# Patient Record
Sex: FEMALE | Race: WHITE | Employment: UNEMPLOYED
[De-identification: names, ages, dates, MRNs, and addresses within clinical notes are randomized per-mention and may not be internally consistent; named-entity substitution may affect disease eponyms.]

---

## 2017-02-08 ENCOUNTER — OFFICE VISIT (OUTPATIENT)
Dept: ORTHOPEDIC SURGERY | Facility: CLINIC | Age: 72
End: 2017-02-08

## 2017-02-08 DIAGNOSIS — M25.561 CHRONIC PAIN OF BOTH KNEES: Primary | ICD-10-CM

## 2017-02-08 DIAGNOSIS — G89.29 CHRONIC PAIN OF BOTH KNEES: Primary | ICD-10-CM

## 2017-02-08 DIAGNOSIS — M25.562 CHRONIC PAIN OF BOTH KNEES: Primary | ICD-10-CM

## 2017-02-08 PROCEDURE — 99213 OFFICE O/P EST LOW 20 MIN: CPT | Performed by: ORTHOPAEDIC SURGERY

## 2017-05-15 PROBLEM — H61.009 CHONDRODERMATITIS NODULARIS CHRONICA HELICIS: Status: ACTIVE | Noted: 2017-05-15

## 2017-05-15 PROBLEM — L91.8 INFLAMED SKIN TAG: Status: ACTIVE | Noted: 2017-05-15

## 2017-08-04 ENCOUNTER — HOSPITAL ENCOUNTER (OUTPATIENT)
Dept: PHYSICAL THERAPY | Age: 72
Setting detail: THERAPIES SERIES
Discharge: HOME | End: 2017-08-04
Payer: MEDICARE

## 2017-08-04 PROCEDURE — 97110 THERAPEUTIC EXERCISES: CPT

## 2017-08-04 PROCEDURE — G8979 MOBILITY GOAL STATUS: HCPCS

## 2017-08-04 PROCEDURE — G8978 MOBILITY CURRENT STATUS: HCPCS

## 2017-08-04 PROCEDURE — 97162 PT EVAL MOD COMPLEX 30 MIN: CPT

## 2017-08-09 ENCOUNTER — HOSPITAL ENCOUNTER (OUTPATIENT)
Dept: PHYSICAL THERAPY | Age: 72
Setting detail: THERAPIES SERIES
Discharge: HOME | End: 2017-08-09
Payer: MEDICARE

## 2017-08-09 PROCEDURE — 97113 AQUATIC THERAPY/EXERCISES: CPT

## 2017-08-09 ASSESSMENT — PAIN DESCRIPTION - LOCATION: LOCATION: ANKLE;BACK

## 2017-08-09 ASSESSMENT — PAIN DESCRIPTION - ORIENTATION: ORIENTATION: LOWER;LEFT

## 2017-08-09 ASSESSMENT — PAIN SCALES - GENERAL: PAINLEVEL_OUTOF10: 1

## 2017-08-14 ENCOUNTER — HOSPITAL ENCOUNTER (OUTPATIENT)
Dept: PHYSICAL THERAPY | Age: 72
Setting detail: THERAPIES SERIES
Discharge: HOME | End: 2017-08-14
Payer: MEDICARE

## 2017-08-14 PROCEDURE — 97113 AQUATIC THERAPY/EXERCISES: CPT

## 2017-08-14 ASSESSMENT — PAIN DESCRIPTION - ORIENTATION: ORIENTATION: LOWER

## 2017-08-14 ASSESSMENT — PAIN SCALES - GENERAL: PAINLEVEL_OUTOF10: 1

## 2017-08-14 ASSESSMENT — PAIN DESCRIPTION - PAIN TYPE: TYPE: CHRONIC PAIN

## 2017-08-14 ASSESSMENT — PAIN DESCRIPTION - LOCATION: LOCATION: BACK

## 2017-08-16 ENCOUNTER — HOSPITAL ENCOUNTER (OUTPATIENT)
Dept: PHYSICAL THERAPY | Age: 72
Setting detail: THERAPIES SERIES
Discharge: HOME | End: 2017-08-16
Payer: MEDICARE

## 2017-08-16 PROCEDURE — 97113 AQUATIC THERAPY/EXERCISES: CPT

## 2017-08-21 ENCOUNTER — HOSPITAL ENCOUNTER (OUTPATIENT)
Dept: PHYSICAL THERAPY | Age: 72
Setting detail: THERAPIES SERIES
Discharge: HOME | End: 2017-08-21
Payer: MEDICARE

## 2017-08-21 PROCEDURE — 97113 AQUATIC THERAPY/EXERCISES: CPT

## 2017-08-21 ASSESSMENT — PAIN DESCRIPTION - PAIN TYPE: TYPE: CHRONIC PAIN

## 2017-08-21 ASSESSMENT — PAIN DESCRIPTION - ORIENTATION: ORIENTATION: LOWER

## 2017-08-21 ASSESSMENT — PAIN SCALES - GENERAL: PAINLEVEL_OUTOF10: 1

## 2017-08-21 ASSESSMENT — PAIN DESCRIPTION - LOCATION: LOCATION: BACK

## 2017-08-23 ENCOUNTER — HOSPITAL ENCOUNTER (OUTPATIENT)
Dept: PHYSICAL THERAPY | Age: 72
Setting detail: THERAPIES SERIES
Discharge: HOME | End: 2017-08-23
Payer: MEDICARE

## 2017-08-23 PROCEDURE — 97113 AQUATIC THERAPY/EXERCISES: CPT

## 2017-08-28 ENCOUNTER — HOSPITAL ENCOUNTER (OUTPATIENT)
Dept: PHYSICAL THERAPY | Age: 72
Setting detail: THERAPIES SERIES
Discharge: HOME | End: 2017-08-28
Payer: MEDICARE

## 2017-08-28 PROCEDURE — 97113 AQUATIC THERAPY/EXERCISES: CPT

## 2017-08-30 ENCOUNTER — APPOINTMENT (OUTPATIENT)
Dept: PHYSICAL THERAPY | Age: 72
End: 2017-08-30
Payer: MEDICARE

## 2017-08-30 ENCOUNTER — HOSPITAL ENCOUNTER (OUTPATIENT)
Dept: PHYSICAL THERAPY | Age: 72
Setting detail: THERAPIES SERIES
Discharge: HOME | End: 2017-08-30
Payer: MEDICARE

## 2017-08-30 PROCEDURE — G8979 MOBILITY GOAL STATUS: HCPCS

## 2017-08-30 PROCEDURE — 97113 AQUATIC THERAPY/EXERCISES: CPT

## 2017-08-30 PROCEDURE — G8978 MOBILITY CURRENT STATUS: HCPCS

## 2017-08-30 PROCEDURE — 97110 THERAPEUTIC EXERCISES: CPT

## 2017-09-06 ENCOUNTER — HOSPITAL ENCOUNTER (OUTPATIENT)
Dept: PHYSICAL THERAPY | Age: 72
Setting detail: THERAPIES SERIES
Discharge: HOME | End: 2017-09-06
Payer: MEDICARE

## 2017-09-06 PROCEDURE — 97113 AQUATIC THERAPY/EXERCISES: CPT

## 2017-09-08 ENCOUNTER — HOSPITAL ENCOUNTER (OUTPATIENT)
Dept: PHYSICAL THERAPY | Age: 72
Setting detail: THERAPIES SERIES
Discharge: HOME | End: 2017-09-08
Payer: MEDICARE

## 2017-09-08 PROCEDURE — 97113 AQUATIC THERAPY/EXERCISES: CPT

## 2017-09-11 ENCOUNTER — HOSPITAL ENCOUNTER (OUTPATIENT)
Dept: PHYSICAL THERAPY | Age: 72
Setting detail: THERAPIES SERIES
Discharge: HOME | End: 2017-09-11
Payer: MEDICARE

## 2017-09-13 ENCOUNTER — HOSPITAL ENCOUNTER (OUTPATIENT)
Dept: PHYSICAL THERAPY | Age: 72
Setting detail: THERAPIES SERIES
Discharge: HOME | End: 2017-09-13
Payer: MEDICARE

## 2017-09-13 PROCEDURE — 97113 AQUATIC THERAPY/EXERCISES: CPT

## 2017-09-13 ASSESSMENT — PAIN SCALES - GENERAL: PAINLEVEL_OUTOF10: 2

## 2017-09-13 ASSESSMENT — PAIN DESCRIPTION - ORIENTATION: ORIENTATION: LOWER

## 2017-09-13 ASSESSMENT — PAIN DESCRIPTION - LOCATION: LOCATION: BACK

## 2017-09-13 ASSESSMENT — PAIN DESCRIPTION - PAIN TYPE: TYPE: CHRONIC PAIN

## 2017-09-15 ENCOUNTER — HOSPITAL ENCOUNTER (OUTPATIENT)
Dept: PHYSICAL THERAPY | Age: 72
Setting detail: THERAPIES SERIES
Discharge: HOME | End: 2017-09-15
Payer: MEDICARE

## 2017-09-15 PROCEDURE — 97113 AQUATIC THERAPY/EXERCISES: CPT

## 2018-04-19 ENCOUNTER — ANESTHESIA EVENT (OUTPATIENT)
Dept: OPERATING ROOM | Age: 73
End: 2018-04-19
Payer: MEDICARE

## 2018-04-19 ENCOUNTER — HOSPITAL ENCOUNTER (OUTPATIENT)
Age: 73
Setting detail: OUTPATIENT SURGERY
Discharge: HOME | End: 2018-04-19
Attending: SURGERY | Admitting: SURGERY
Payer: MEDICARE

## 2018-04-19 ENCOUNTER — ANESTHESIA (OUTPATIENT)
Dept: OPERATING ROOM | Age: 73
End: 2018-04-19
Payer: MEDICARE

## 2018-04-19 VITALS
DIASTOLIC BLOOD PRESSURE: 77 MMHG | HEART RATE: 67 BPM | OXYGEN SATURATION: 92 % | SYSTOLIC BLOOD PRESSURE: 127 MMHG | TEMPERATURE: 97.5 F | WEIGHT: 232 LBS | RESPIRATION RATE: 14 BRPM | BODY MASS INDEX: 39.61 KG/M2 | HEIGHT: 64 IN

## 2018-04-19 VITALS
TEMPERATURE: 96.8 F | DIASTOLIC BLOOD PRESSURE: 86 MMHG | RESPIRATION RATE: 20 BRPM | SYSTOLIC BLOOD PRESSURE: 141 MMHG | OXYGEN SATURATION: 99 %

## 2018-04-19 PROBLEM — Z86.010 HISTORY OF ADENOMATOUS POLYP OF COLON: Status: ACTIVE | Noted: 2018-04-19

## 2018-04-19 PROBLEM — Z86.0101 HISTORY OF ADENOMATOUS POLYP OF COLON: Chronic | Status: ACTIVE | Noted: 2018-04-19

## 2018-04-19 PROBLEM — G89.4 CHRONIC PAIN DISORDER: Status: ACTIVE | Noted: 2018-04-19

## 2018-04-19 PROBLEM — H61.009 CHONDRODERMATITIS NODULARIS CHRONICA HELICIS: Chronic | Status: ACTIVE | Noted: 2017-05-15

## 2018-04-19 PROBLEM — G89.4 CHRONIC PAIN DISORDER: Chronic | Status: ACTIVE | Noted: 2018-04-19

## 2018-04-19 PROBLEM — Z86.010 HISTORY OF ADENOMATOUS POLYP OF COLON: Chronic | Status: ACTIVE | Noted: 2018-04-19

## 2018-04-19 PROBLEM — Z86.0101 HISTORY OF ADENOMATOUS POLYP OF COLON: Status: ACTIVE | Noted: 2018-04-19

## 2018-04-19 PROCEDURE — 88305 TISSUE EXAM BY PATHOLOGIST: CPT

## 2018-04-19 PROCEDURE — 7100000031 HC ASPR PHASE II RECOVERY - ADDTL 15 MIN: Performed by: SURGERY

## 2018-04-19 PROCEDURE — 7100000001 HC PACU RECOVERY - ADDTL 15 MIN: Performed by: SURGERY

## 2018-04-19 PROCEDURE — 3700000000 HC ANESTHESIA ATTENDED CARE: Performed by: SURGERY

## 2018-04-19 PROCEDURE — 3700000001 HC ADD 15 MINUTES (ANESTHESIA): Performed by: SURGERY

## 2018-04-19 PROCEDURE — 2580000003 HC RX 258: Performed by: ANESTHESIOLOGY

## 2018-04-19 PROCEDURE — 7100000000 HC PACU RECOVERY - FIRST 15 MIN: Performed by: SURGERY

## 2018-04-19 PROCEDURE — 2500000003 HC RX 250 WO HCPCS: Performed by: NURSE ANESTHETIST, CERTIFIED REGISTERED

## 2018-04-19 PROCEDURE — 3609010400 HC COLONOSCOPY POLYPECTOMY HOT BIOPSY: Performed by: SURGERY

## 2018-04-19 PROCEDURE — 6360000002 HC RX W HCPCS: Performed by: NURSE ANESTHETIST, CERTIFIED REGISTERED

## 2018-04-19 PROCEDURE — 7100000030 HC ASPR PHASE II RECOVERY - FIRST 15 MIN: Performed by: SURGERY

## 2018-04-19 RX ORDER — KETAMINE HYDROCHLORIDE 50 MG/ML
INJECTION, SOLUTION, CONCENTRATE INTRAMUSCULAR; INTRAVENOUS PRN
Status: DISCONTINUED | OUTPATIENT
Start: 2018-04-19 | End: 2018-04-19 | Stop reason: SDUPTHER

## 2018-04-19 RX ORDER — SODIUM CHLORIDE, SODIUM LACTATE, POTASSIUM CHLORIDE, CALCIUM CHLORIDE 600; 310; 30; 20 MG/100ML; MG/100ML; MG/100ML; MG/100ML
INJECTION, SOLUTION INTRAVENOUS CONTINUOUS
Status: DISCONTINUED | OUTPATIENT
Start: 2018-04-19 | End: 2018-04-19 | Stop reason: HOSPADM

## 2018-04-19 RX ORDER — LIDOCAINE HYDROCHLORIDE 10 MG/ML
INJECTION, SOLUTION EPIDURAL; INFILTRATION; INTRACAUDAL; PERINEURAL PRN
Status: DISCONTINUED | OUTPATIENT
Start: 2018-04-19 | End: 2018-04-19 | Stop reason: SDUPTHER

## 2018-04-19 RX ORDER — DEXAMETHASONE SODIUM PHOSPHATE 4 MG/ML
INJECTION, SOLUTION INTRA-ARTICULAR; INTRALESIONAL; INTRAMUSCULAR; INTRAVENOUS; SOFT TISSUE PRN
Status: DISCONTINUED | OUTPATIENT
Start: 2018-04-19 | End: 2018-04-19 | Stop reason: SDUPTHER

## 2018-04-19 RX ORDER — MIDAZOLAM HYDROCHLORIDE 1 MG/ML
INJECTION INTRAMUSCULAR; INTRAVENOUS PRN
Status: DISCONTINUED | OUTPATIENT
Start: 2018-04-19 | End: 2018-04-19 | Stop reason: SDUPTHER

## 2018-04-19 RX ORDER — PROPOFOL 10 MG/ML
INJECTION, EMULSION INTRAVENOUS PRN
Status: DISCONTINUED | OUTPATIENT
Start: 2018-04-19 | End: 2018-04-19 | Stop reason: SDUPTHER

## 2018-04-19 RX ORDER — LIDOCAINE HYDROCHLORIDE 10 MG/ML
1 INJECTION, SOLUTION EPIDURAL; INFILTRATION; INTRACAUDAL; PERINEURAL ONCE
Status: DISCONTINUED | OUTPATIENT
Start: 2018-04-19 | End: 2018-04-19 | Stop reason: HOSPADM

## 2018-04-19 RX ORDER — ONDANSETRON 2 MG/ML
INJECTION INTRAMUSCULAR; INTRAVENOUS PRN
Status: DISCONTINUED | OUTPATIENT
Start: 2018-04-19 | End: 2018-04-19 | Stop reason: SDUPTHER

## 2018-04-19 RX ADMIN — PROPOFOL 30 MG: 10 INJECTION, EMULSION INTRAVENOUS at 09:20

## 2018-04-19 RX ADMIN — PROPOFOL 20 MG: 10 INJECTION, EMULSION INTRAVENOUS at 09:38

## 2018-04-19 RX ADMIN — PROPOFOL 20 MG: 10 INJECTION, EMULSION INTRAVENOUS at 09:28

## 2018-04-19 RX ADMIN — PROPOFOL 20 MG: 10 INJECTION, EMULSION INTRAVENOUS at 09:35

## 2018-04-19 RX ADMIN — KETAMINE HYDROCHLORIDE 30 MG: 50 INJECTION, SOLUTION INTRAMUSCULAR; INTRAVENOUS at 09:19

## 2018-04-19 RX ADMIN — MIDAZOLAM 1 MG: 1 INJECTION INTRAMUSCULAR; INTRAVENOUS at 09:16

## 2018-04-19 RX ADMIN — MIDAZOLAM 1 MG: 1 INJECTION INTRAMUSCULAR; INTRAVENOUS at 09:13

## 2018-04-19 RX ADMIN — PROPOFOL 20 MG: 10 INJECTION, EMULSION INTRAVENOUS at 09:26

## 2018-04-19 RX ADMIN — ONDANSETRON 4 MG: 2 INJECTION INTRAMUSCULAR; INTRAVENOUS at 09:26

## 2018-04-19 RX ADMIN — SODIUM CHLORIDE, POTASSIUM CHLORIDE, SODIUM LACTATE AND CALCIUM CHLORIDE: 600; 310; 30; 20 INJECTION, SOLUTION INTRAVENOUS at 08:32

## 2018-04-19 RX ADMIN — DEXAMETHASONE SODIUM PHOSPHATE 4 MG: 4 INJECTION, SOLUTION INTRAMUSCULAR; INTRAVENOUS at 09:26

## 2018-04-19 RX ADMIN — PROPOFOL 20 MG: 10 INJECTION, EMULSION INTRAVENOUS at 09:41

## 2018-04-19 RX ADMIN — PROPOFOL 20 MG: 10 INJECTION, EMULSION INTRAVENOUS at 09:23

## 2018-04-19 RX ADMIN — PROPOFOL 20 MG: 10 INJECTION, EMULSION INTRAVENOUS at 09:33

## 2018-04-19 RX ADMIN — LIDOCAINE HYDROCHLORIDE 50 MG: 10 INJECTION, SOLUTION EPIDURAL; INFILTRATION; INTRACAUDAL; PERINEURAL at 09:19

## 2018-04-19 RX ADMIN — PROPOFOL 20 MG: 10 INJECTION, EMULSION INTRAVENOUS at 09:30

## 2018-04-19 ASSESSMENT — PULMONARY FUNCTION TESTS
PIF_VALUE: 0

## 2018-04-19 ASSESSMENT — PAIN SCALES - GENERAL
PAINLEVEL_OUTOF10: 0
PAINLEVEL_OUTOF10: 0

## 2018-04-19 ASSESSMENT — PAIN - FUNCTIONAL ASSESSMENT: PAIN_FUNCTIONAL_ASSESSMENT: 0-10

## 2018-04-20 LAB — SURGICAL PATHOLOGY REPORT: NORMAL

## 2018-06-18 ENCOUNTER — HOSPITAL ENCOUNTER (OUTPATIENT)
Dept: WOMENS IMAGING | Age: 73
Discharge: HOME | End: 2018-06-20
Payer: MEDICARE

## 2018-06-18 DIAGNOSIS — I10 ESSENTIAL HYPERTENSION: Chronic | ICD-10-CM

## 2018-06-18 DIAGNOSIS — Z78.0 MENOPAUSE: ICD-10-CM

## 2018-06-18 DIAGNOSIS — Z12.31 ENCOUNTER FOR SCREENING MAMMOGRAM FOR MALIGNANT NEOPLASM OF BREAST: ICD-10-CM

## 2018-06-18 PROCEDURE — 77067 SCR MAMMO BI INCL CAD: CPT

## 2018-06-18 PROCEDURE — 77080 DXA BONE DENSITY AXIAL: CPT

## 2018-09-26 ENCOUNTER — HOSPITAL ENCOUNTER (OUTPATIENT)
Dept: MRI IMAGING | Facility: CLINIC | Age: 73
Discharge: HOME | End: 2018-09-28
Payer: MEDICARE

## 2018-09-26 DIAGNOSIS — M54.16 RADICULOPATHY OF LUMBAR REGION: ICD-10-CM

## 2018-09-26 PROCEDURE — 72148 MRI LUMBAR SPINE W/O DYE: CPT

## 2018-10-22 PROBLEM — R35.0 URINARY FREQUENCY: Status: ACTIVE | Noted: 2018-10-22

## 2018-11-08 ENCOUNTER — HOSPITAL ENCOUNTER (OUTPATIENT)
Dept: PHYSICAL THERAPY | Age: 73
Setting detail: THERAPIES SERIES
Discharge: HOME | End: 2018-11-08
Payer: MEDICARE

## 2018-11-08 PROCEDURE — G8979 MOBILITY GOAL STATUS: HCPCS

## 2018-11-08 PROCEDURE — 97110 THERAPEUTIC EXERCISES: CPT

## 2018-11-08 PROCEDURE — 97162 PT EVAL MOD COMPLEX 30 MIN: CPT

## 2018-11-08 PROCEDURE — G8978 MOBILITY CURRENT STATUS: HCPCS

## 2018-11-08 NOTE — PROGRESS NOTES
[address] [facility]  [address] [facility]. [address]         Phone: [phone]       Fax: [phone]     Physical Therapy Spine Evaluation    Date:  2018  Patient: Aristides Oshea  : 1945  MRN: [id number]  Physician: Feliciano Bucio 2: [address] [address]   Medical Diagnosis: M51.26 lumbar disc displacement  Rehab Codes: low back pain  Onset Date: 10/25/18 referral  Next 's appt.:     Subjective: Patient presents to physical therapy with complaints of pain in the (L) lumbar spine. Patient has had intermittent symptoms for years, but recent increase prompted MD appointment and referral to therapy. Patient notes increased complaints of symptoms with walking more than 5 minutes, with standing 10 minutes. Patient with increased complaints of symptoms in the AM, more \"stiffness\". Patient has previously done aquatic therapy with positive results. MRI showed significant degenerative issues per the patient, but patient wants to do nonsurgical treatments. CC:complaints of low back pain in the (L) lateral lumbar spine  HPI: onset of symptoms for years, recent increase prompted MD appointment on above referral date.     PMHx: [] Unremarkable [] Diabetes [] HTN  [] Pacemaker   [] MI/Heart Problems [] Cancer [] Arthritis [] Other:              [x] Refer to full medical chart  In EPIC   Tests: [x] X-Ray: [] MRI:  [] Other:    Job/ADL Description:  Pain:  [x] Yes  [] No Location: low back, into (L) lateral lumbar spine Pain Rating: (0-10 scale) 1/10 currently up to 8/10 @ worst  Pain altered Tx:  [] Yes  [] No  Action:  Symptoms:  [] Improving [] Worsening [] Same      Objective:  LE strength testing WFL (B)  ROM:  Lumbar flexion to ankles, extension to 8 degrees      TESTS (+/-) LEFT RIGHT Not Tested   SLR [] sit [] supine (-) (-) []   Slump/Dural (-) (-) []       OBSERVATION No Deficit Deficit Not Tested Comments   Posture       Palpation [] [] [] MIN TTP L3-L4   Sensation []

## 2018-11-15 ENCOUNTER — HOSPITAL ENCOUNTER (OUTPATIENT)
Dept: PHYSICAL THERAPY | Age: 73
Setting detail: THERAPIES SERIES
Discharge: HOME | End: 2018-11-15
Payer: MEDICARE

## 2018-11-15 PROCEDURE — 97113 AQUATIC THERAPY/EXERCISES: CPT

## 2018-11-15 NOTE — PROGRESS NOTES
Physical Therapy    [address] [place] [address] Outpatient Physical Therapy   [address]   Phone: [phone]   Fax: [phone]    Physical Therapy Daily Treatment Note    Date:  11/15/2018  Patient Name:  Khai Whitfield    :  1945  MRN: [id number]  Physician: [address]: [address] [place] [address]. [facility]  Medical Diagnosis: M51.26 lumbar disc displacement                     Rehab Codes: low back pain  Onset Date: 10/25/18 referral                 Next 's appt. :   Visit# / total visits: 2  Cancels/No Shows: 0/0    Subjective:   Denies pain this morning due to taking her pain meds. States pain was only 1/10 before but knew she was coming to therapy so she took her meds. Pain:  [] Yes  [x] No Location:   Pain Rating: (0-10 scale) 0/10  Pain altered Tx:  [] No  [] Yes  Action:  Comments:Initial aquatic therapy visit. Educated on postural awareness and pelvic neutral.  Also educated to work within pain free ranges and avoid aggravating movements. Objective:  [facility] Exercise Log  Aquatic, Hip & DLS Program- Phase 1    Date of Eval:                                Primary PT:  Diagnosis: Things to Focus On (goals):   Surgical Precautions:  Medical Precautions:  [] C-9 dates  [] Occ Med   [] Medicare       Date 11/15/18       Visit # 2/12       Walk F/L/R 2 Laps       Marching 15x       Squats 10x5\"       Step-Ups F/L        Heel-toe raises 15x       SLR F/L/R 15x       Knee/Flex/Ext        F/L Lunges        Kickboard Ex. Sm       Iso Abd. 10x5\"       Push-pull 10x       Paddling 10x               Deep water 1 Noodle       Hang 5'       Cycling 2'       Stretches        Achllies 2x20\"       Hamstring 2x20\"                               Cool Down 2 Laps               Pain Rating 0         Specific Instructions for next treatment: assess Patient's tolerance to exercise and progress as able.     Treatment Charges: Mins Units   []  Modalities []  Ther Exercise     []  Manual Therapy     []  Ther Activities     [x]  Aquatics 30 2   []  Neuromuscular     []  Other     Total Treatment time 30 2       Assessment: [x] Progressing toward goals. [] No change. [] Other:    STG: (to be met in 6 treatments)  1. ? Pain: Improved pain levels 2-3@ worse, 0-1 at rest  2. ? ROM: Improved flexion to floor without pain, improved extension to 15 degrees with minimal complaints of symptoms. 3. ? Function: Improved functional tolerance of walking up to 15 minutes, improved standing tolerance up to 20 minutes with minimal to no symptoms, improved symptoms in the AM by 80% grossly.     LTG 12 visits  4. ? Pain: Improved pain levels 0-1  5. ? ROM: Improved flexion to floor without pain, improved extension to 20 degrees with minimal complaints of symptoms. 6. ? Function: Improved functional tolerance of walking up to 30 minutes, improved standing tolerance up to 30 minutes with minimal to no symptoms, improved symptoms in the AM by 90% grossly. Patient goals: improved function    Pt. Education:  [x] Yes  [] No  [] Reviewed Prior HEP/Ed  Method of Education: [x] Verbal  [x] Demo  [] Written  Comprehension of Education:  [x] Verbalizes understanding. [x] Demonstrates understanding. [] Needs review. [] Demonstrates/verbalizes HEP/Ed previously given. Plan: [x] Continue per plan of care.    [] Other:      Time In: 2846         Time Out: 0999    Electronically signed by:  Berneice Ganser, PTA

## 2018-11-19 ENCOUNTER — HOSPITAL ENCOUNTER (OUTPATIENT)
Dept: PHYSICAL THERAPY | Age: 73
Setting detail: THERAPIES SERIES
Discharge: HOME | End: 2018-11-19
Payer: MEDICARE

## 2018-11-19 PROCEDURE — 97113 AQUATIC THERAPY/EXERCISES: CPT

## 2018-11-19 NOTE — PROGRESS NOTES
[x]  Aquatics 30 2   []  Neuromuscular     []  Other     Total Treatment time 30 2       Assessment: [x] Progressing toward goals. [] No change. [] Other:    STG: (to be met in 6 treatments)  1. ? Pain: Improved pain levels 2-3@ worse, 0-1 at rest  2. ? ROM: Improved flexion to floor without pain, improved extension to 15 degrees with minimal complaints of symptoms. 3. ? Function: Improved functional tolerance of walking up to 15 minutes, improved standing tolerance up to 20 minutes with minimal to no symptoms, improved symptoms in the AM by 80% grossly.     LTG 12 visits  4. ? Pain: Improved pain levels 0-1  5. ? ROM: Improved flexion to floor without pain, improved extension to 20 degrees with minimal complaints of symptoms. 6. ? Function: Improved functional tolerance of walking up to 30 minutes, improved standing tolerance up to 30 minutes with minimal to no symptoms, improved symptoms in the AM by 90% grossly. Patient goals: improved function    Pt. Education:  [x] Yes  [] No  [] Reviewed Prior HEP/Ed  Method of Education: [x] Verbal  [x] Demo  [] Written  Comprehension of Education:  [x] Verbalizes understanding. [x] Demonstrates understanding. [] Needs review. [] Demonstrates/verbalizes HEP/Ed previously given. Plan: [x] Continue per plan of care.    [] Other:      Time In: 9638         Time Out: 1005    Electronically signed by:  Cyndi Junior PTA

## 2018-11-21 ENCOUNTER — HOSPITAL ENCOUNTER (OUTPATIENT)
Dept: PHYSICAL THERAPY | Age: 73
Setting detail: THERAPIES SERIES
Discharge: HOME | End: 2018-11-21
Payer: MEDICARE

## 2018-11-21 PROCEDURE — 97113 AQUATIC THERAPY/EXERCISES: CPT

## 2018-11-21 NOTE — PROGRESS NOTES
Physical Therapy    [address] [facility]   [address]   Phone: [phone]   Fax: [phone]    Physical Therapy Daily Treatment Note    Date:  2018  Patient Name:  Esteban Moe    :  1945  MRN: [id number]  Physician: [address]: [address] [place] [address]. [facility]  Medical Diagnosis: M51.26 lumbar disc displacement                     Rehab Codes: low back pain  Onset Date: 10/25/18 referral                 Next 's appt. :   Visit# / total visits:   Cancels/No Shows: 0/0    Subjective:   States a little achy this morning but not bad. Pain:  [x] Yes  [] No Location: Low back  Pain Rating: (0-10 scale) 1/10  Pain altered Tx:  [] No  [] Yes  Action:  Comments:reviewed postural awareness and pelvic neutral.      Objective:  [facility] Exercise Log  Aquatic, Hip & DLS Program- Phase 1    Date of Eval:                                Primary PT:  Diagnosis: Things to Focus On (goals):   Surgical Precautions:  Medical Precautions:  [] C-9 dates  [] Occ Med   [] Medicare       Date 11/15/18 11/19/18 11/21/18     Visit # 212 3/12 4/12     Walk F/L/R 2 Laps 2 Laps 2 Laps     Marching 15x 2 Laps 2 Laps     Squats 10x5\" 15x5\" 15x5\"     Step-Ups F/L  Low 10x Tall 10x     Heel-toe raises 15x 15x 15x     SLR F/L/R 15x 15x 15x     Knee/Flex/Ext  15x 15x     F/L Lunges        Kickboard Ex. Sm Med Med     Iso Abd. 10x5\" 15x5\" 15x5\"     Push-pull 10x 15x 15x     Paddling 10x 15x 15x             Deep water 1 Noodle 1 Noodle 1 Noodle     Hang 5' 5' 5'     Cycling 2' 2' 2'     Cross country   2'     Jacks  2' 2'     Stretches        Achllies 2x20\" 2x20\" 2x20\"     Hamstring 2x20\" 2x20\" 2x20\"                             Cool Down 2 Laps 2 Laps 2 Laps             Pain Rating 0 0 1               Specific Instructions for next treatment: assess Patient's tolerance to exercise and progress as able.     Treatment Charges: Mins

## 2018-11-26 ENCOUNTER — HOSPITAL ENCOUNTER (OUTPATIENT)
Dept: PHYSICAL THERAPY | Age: 73
Setting detail: THERAPIES SERIES
Discharge: HOME | End: 2018-11-26
Payer: MEDICARE

## 2018-11-26 PROCEDURE — 97113 AQUATIC THERAPY/EXERCISES: CPT

## 2018-11-29 ENCOUNTER — HOSPITAL ENCOUNTER (OUTPATIENT)
Dept: PHYSICAL THERAPY | Age: 73
Setting detail: THERAPIES SERIES
Discharge: HOME | End: 2018-11-29
Payer: MEDICARE

## 2018-11-29 PROCEDURE — 97113 AQUATIC THERAPY/EXERCISES: CPT

## 2018-11-29 NOTE — PROGRESS NOTES
2'   Cross country   2' 2' 2'   Jacks  2' 2' 2' 2'   Stretches        Achllies 2x20\" 2x20\" 2x20\" 2x20\" 2x20\"   Hamstring 2x20\" 2x20\" 2x20\" 2x20\" 2x20\"           Cool Down 2 Laps 2 Laps 2 Laps 2 Laps 2 Laps   Pain Rating 0 0 1 1 1     Specific Instructions for next treatment: Add UE format to challenge core stability    Treatment Charges: Mins Units   []  Modalities     []  Ther Exercise     []  Manual Therapy     []  Ther Activities     [x]  Aquatics 40 3   []  Neuromuscular     []  Other     Total Treatment time 40 3       Assessment: [x] Progressing toward goals. Reviewed technique and emphasized good core stability with exercise. Added Tall box to squats and started UE core stability exercises today. [] No change. [] Other:    STG: (to be met in 6 treatments)  1. ? Pain: Improved pain levels 2-3@ worse, 0-1 at rest  2. ? ROM: Improved flexion to floor without pain, improved extension to 15 degrees with minimal complaints of symptoms. 3. ? Function: Improved functional tolerance of walking up to 15 minutes, improved standing tolerance up to 20 minutes with minimal to no symptoms, improved symptoms in the AM by 80% grossly.     LTG 12 visits  4. ? Pain: Improved pain levels 0-1  5. ? ROM: Improved flexion to floor without pain, improved extension to 20 degrees with minimal complaints of symptoms. 6. ? Function: Improved functional tolerance of walking up to 30 minutes, improved standing tolerance up to 30 minutes with minimal to no symptoms, improved symptoms in the AM by 90% grossly. Patient goals: improved function    Pt. Education:  [x] Yes  [] No  [] Reviewed Prior HEP/Ed  Method of Education: [x] Verbal  [x] Demo  [] Written  Comprehension of Education:  [x] Verbalizes understanding. [x] Demonstrates understanding. [] Needs review. [] Demonstrates/verbalizes HEP/Ed previously given. Plan: [x] Continue per plan of care.    [] Other:      Time In: 083         Time Out: 2638    Electronically signed by:  Berneice Ganser, PTA

## 2018-12-03 ENCOUNTER — HOSPITAL ENCOUNTER (OUTPATIENT)
Dept: PHYSICAL THERAPY | Age: 73
Setting detail: THERAPIES SERIES
End: 2018-12-03
Payer: MEDICARE

## 2018-12-06 ENCOUNTER — HOSPITAL ENCOUNTER (OUTPATIENT)
Dept: PHYSICAL THERAPY | Age: 73
Setting detail: THERAPIES SERIES
Discharge: HOME | End: 2018-12-06
Payer: MEDICARE

## 2018-12-06 PROCEDURE — 97113 AQUATIC THERAPY/EXERCISES: CPT

## 2018-12-10 ENCOUNTER — HOSPITAL ENCOUNTER (OUTPATIENT)
Dept: PHYSICAL THERAPY | Age: 73
Setting detail: THERAPIES SERIES
Discharge: HOME | End: 2018-12-10
Payer: MEDICARE

## 2018-12-10 PROCEDURE — 97113 AQUATIC THERAPY/EXERCISES: CPT

## 2018-12-10 NOTE — PROGRESS NOTES
Physical Therapy                    Daily treatment note     [address]   Outpatient Physical Therapy  [address] [facility]. [address]  Phone: [phone]  Fax: [phone]  Daily Progress Note    Date: 12/10/18    Patient Name: Thang Guzmán        MRN: [id number]  Account: [de-identified] : 1945           Objective:    [address] [facility]             [address] [facility] [address]             Phone: [phone]             Fax: [phone]        Date:  12/10/2018  Patient Name:  Thang Guzmán                :  1945               MRN: [id number]  Physician: [place] Hogan [address] Diagnosis: M51.26 lumbar disc displacement                     Rehab Codes: low back pain  Onset Date: 10/25/18 referral                 Next 's appt. :   Visit# / total visits:                Cancels/No Shows: 0/0     Subjective: \"I wish I could come here for a membership. \" Discussed silver sneakers. She has a membership, but hasn't gone yet. Pain:  [x] Yes  [] No       Location: Low back; denies radicular pain  Pain Rating: (0-10 scale) 2/10  Pain altered Tx:  [x] No  [] Yes  Action:  Comments: Emphasis on core stability and postural awareness     Objective:  [facility] Exercise Log  Aquatic, Hip & DLS Program- Phase 1     Date of Eval:                                Primary PT: Apryl Turcios PT  Diagnosis:   Things to Focus On (goals):   Surgical Precautions:  Medical Precautions:  [] C-9 dates  [] Occ Med   [x] Medicare      Date 11/26/18 11/29/18 12/6/18 12/10/18    Visit #    Small cuffs   Walk F/L/R 2 Laps 2 Laps 2 Laps 2 Laps    Marching 2 Laps 2 Laps 2 Laps 2 Laps    Squats 15x5\" Tall 15x5\" Tall 15x5\" Tall 15x5\"    Step-Ups F/L Tall 15x Tall 15x Tall 15x Tall 15x    Heel-toe raises 15x 15x 15x 15x    SLR F/L/R 15x 15x 15x 15x    Knee/Flex/Ext 15x 15x 15x 15x    F/L

## 2018-12-13 ENCOUNTER — HOSPITAL ENCOUNTER (OUTPATIENT)
Dept: PHYSICAL THERAPY | Age: 73
Setting detail: THERAPIES SERIES
End: 2018-12-13
Payer: MEDICARE

## 2018-12-13 ENCOUNTER — HOSPITAL ENCOUNTER (OUTPATIENT)
Dept: PHYSICAL THERAPY | Age: 73
Setting detail: THERAPIES SERIES
Discharge: HOME | End: 2018-12-13
Payer: MEDICARE

## 2018-12-13 NOTE — PROGRESS NOTES
[address] [facility]   [address] [facility] [address]   Phone: [phone]   Fax: [phone]    Physical Therapy Cancel/No Show Note    Date:  2018  Patient Name:  Charles Craig    :  1945  MRN: [id number]  Physician: [address] [place] [address] Road: [address] [place] [address]. [facility] [address] [facility]  Medical Diagnosis: M51.26 lumbar disc displacement                     Rehab Codes: low back pain  Onset Date: 10/25/18 referral                 Next 's appt. :   Visit# / total visits:   Cancels/No Shows: 1/0  Comments: Cancelled today due to furnace going out.      Electronically signed by:  Marija Galvan PTA

## 2018-12-17 ENCOUNTER — HOSPITAL ENCOUNTER (OUTPATIENT)
Dept: PHYSICAL THERAPY | Age: 73
Setting detail: THERAPIES SERIES
Discharge: HOME | End: 2018-12-17
Payer: MEDICARE

## 2018-12-17 PROCEDURE — 97113 AQUATIC THERAPY/EXERCISES: CPT

## 2018-12-17 NOTE — PROGRESS NOTES
[]       Assessment:  Patient with improved complaints of lumbar and (L) lateral lumbar pain at this time. Improved complaints of pain levels, improved tolerance of ambulation with less back pain, improved complaints of stiffness in the AM with ambulation, improved standing tolerance with less pain at this time. Continues to be limited with ascending/descending stairs secondary to back and (R) hip pain, limited with complaints of stiffness in the AM which is improved but not resolved. Patient would benefit from additional therapy at this time to continue to work on limitations per flow sheet. STG: (to be met in 6 treatments)  1. ? Pain: Improved pain levels 2-3@ worse, 0-1 at rest met  2. ? ROM: Improved flexion to floor without pain progressing, improved extension to 15 degrees with minimal complaints of symptoms progressing. 3. ? Function: Improved functional tolerance of walking up to 15 minutes met, improved standing tolerance up to 20 minutes with minimal to no symptoms met, improved symptoms in the AM by 80% grossly progressing.     LTG 12 visits  4. ? Pain: Improved pain levels 0-1 progressing  5. ? ROM: Improved flexion to floor without pain progressing, improved extension to 20 degrees with minimal complaints of symptoms progressing. 6. ? Function: Improved functional tolerance of walking up to 30 minutes progressing, improved standing tolerance up to 30 minutes with minimal to no symptoms progressing, improved symptoms in the AM by 90% grossly progressing.   Patient goals: improved function    Treatment to Date:  [x] Therapeutic Exercise    [] Modalities:  [] Therapeutic Activity    [] Ultrasound  [] Electrical Stimulation  [] Gait Training     [] Massage       [] Lumbar/Cervical Traction  [] Neuromuscular Re-education [] Cold/hotpack [] Iontophoresis: 4 mg/mL  [x] Instruction in Home Exercise Program                     Dexamethasone Sodium  [] Manual Therapy             Phosphate 40-80 mAmin  [x] Aquatic Therapy                   [] Vasocompression/    [] Other:            Game Ready    Patient Status:     [] Continue per initial plan of care. [x] Additional visits necessary. [] Other:     Requested Frequency/Duration: 2-3 times per week for 12 treatments. Electronically signed by: Tyrone Bhatt PT    If you have any questions or concerns, please don't hesitate to call. Thank you for your referral.    Physician Signature:________________________________Date:_____12/17/18_____________  By signing above or cosigning this note, I have reviewed this plan of care and certify a need for medically necessary rehabilitation services.      *PLEASE SIGN ABOVE AND FAX BACK ALL PAGES*

## 2018-12-17 NOTE — PROGRESS NOTES
[address] [facility] Outpatient [facility] Therapy   [address]   Phone: [phone]   Fax: [phone]    Physical Therapy Daily Treatment Note    Date:  2018  Patient Name:  Sarah Watkins    :  1945  MRN: [id number]  Physician: [address]: [address] [address]. [facility]  Medical Diagnosis: M51.26 lumbar disc displacement                     Rehab Codes: low back pain  Onset Date: 10/25/18 referral                 Next 's appt. :   Visit# / total visits:   Cancels/No Shows: 0/0    Subjective:    Notes no pain unless going up stairs. States its more her Right hip causing problems at this time. States still stiff in the morning or if sitting too long. Notes slowly improving since starting therapy. Pain:  [x] Yes  [] No Location: Low back; denies radicular pain  Pain Rating: (0-10 scale) 3/10  Pain altered Tx:  [] No  [] Yes  Action:  Comments: Emphasis on core stability and postural awareness    Objective:  [address] [facility] Exercise Log  Aquatic, Hip & DLS Program- Phase 1    Date of Eval:                                Primary PT: Edwardo Smith PT  Diagnosis: Things to Focus On (goals):   Surgical Precautions:  Medical Precautions:  [] C-9 dates  [] Occ Med   [x] Medicare     Date 18    Visit #        Sm Cuffs    Walk F/L/R 2 Laps 2 Laps 2 Laps    Marching 2 Laps 2 Laps 2 Laps    Squats Tall 15x5\" Tall 15x5\" 15x5\"    Step-Ups F/L Tall 15x Tall 15x Tall 10x    Heel-toe raises 15x 15x 15x    SLR F/L/R 15x 15x 15x    Knee/Flex/Ext 15x 15x 15x    F/L Lunges       Kickboard Ex.  Med Med Med    Iso Abd. 15x5\" 15x5\" 15x5\"    Push-pull 15x 15x 15x    Paddling 15x 15x 15x           UE Format       Horiz Abd 10x 10x 15x    Wipers 10x 10x 15x    Alt Flex/Abd 10x 10x 15x    Press downs 10x 10x 15x    Abd/Add 10x 10x 15x           Deep water 1 Noodle 1 Noodle 1 Noodle    Hang 5' 5' 5'    Cycling 2' 2' 2'    Cross country 2' 2' 2'    Jacks 2' 2' 2'    Stretches       Achllies 2x20\" 2x20\" 2x20\"    Hamstring 2x20\" 2x20\" 2x20\"           Cool Down 2 Laps 2 Laps 2 Laps    Pain Rating 1 2 0      Specific Instructions for next treatment:     Treatment Charges: Mins Units   []  Modalities     []  Ther Exercise     []  Manual Therapy     []  Ther Activities     [x]  Aquatics 40 3   []  Neuromuscular     []  Other     Total Treatment time 40 3       Assessment: [x] Progressing toward goals. Reviewed technique and emphasized good core stability with exercise. [] No change. [] Other:    STG: (to be met in 6 treatments)  1. ? Pain: Improved pain levels 2-3@ worse, 0-1 at rest  2. ? ROM: Improved flexion to floor without pain, improved extension to 15 degrees with minimal complaints of symptoms. 3. ? Function: Improved functional tolerance of walking up to 15 minutes, improved standing tolerance up to 20 minutes with minimal to no symptoms, improved symptoms in the AM by 80% grossly.     LTG 12 visits  4. ? Pain: Improved pain levels 0-1  5. ? ROM: Improved flexion to floor without pain, improved extension to 20 degrees with minimal complaints of symptoms. 6. ? Function: Improved functional tolerance of walking up to 30 minutes, improved standing tolerance up to 30 minutes with minimal to no symptoms, improved symptoms in the AM by 90% grossly. Patient goals: improved function    Pt. Education:  [x] Yes  [] No  [] Reviewed Prior HEP/Ed  Method of Education: [x] Verbal  [x] Demo  [] Written  Comprehension of Education:  [x] Verbalizes understanding. [x] Demonstrates understanding. [] Needs review. [] Demonstrates/verbalizes HEP/Ed previously given. Plan: [x] Continue per plan of care.    [] Other:      Time In: 2119         Time Out: 2763    Electronically signed by:  Kenyetta Peraza PTA

## 2018-12-20 ENCOUNTER — HOSPITAL ENCOUNTER (OUTPATIENT)
Dept: PHYSICAL THERAPY | Age: 73
Setting detail: THERAPIES SERIES
Discharge: HOME | End: 2018-12-20
Payer: MEDICARE

## 2018-12-20 PROCEDURE — 97113 AQUATIC THERAPY/EXERCISES: CPT

## 2018-12-21 ENCOUNTER — OFFICE VISIT (OUTPATIENT)
Dept: PRIMARY CARE CLINIC | Age: 73
End: 2018-12-21
Payer: MEDICARE

## 2018-12-21 VITALS
SYSTOLIC BLOOD PRESSURE: 124 MMHG | DIASTOLIC BLOOD PRESSURE: 80 MMHG | BODY MASS INDEX: 40.61 KG/M2 | HEART RATE: 96 BPM | OXYGEN SATURATION: 97 % | WEIGHT: 236.6 LBS

## 2018-12-21 DIAGNOSIS — M54.16 LUMBAR RADICULOPATHY: Chronic | ICD-10-CM

## 2018-12-21 DIAGNOSIS — G89.4 CHRONIC PAIN DISORDER: Chronic | ICD-10-CM

## 2018-12-21 DIAGNOSIS — I10 ESSENTIAL HYPERTENSION: Primary | Chronic | ICD-10-CM

## 2018-12-21 DIAGNOSIS — E78.2 MIXED HYPERLIPIDEMIA: Chronic | ICD-10-CM

## 2018-12-21 PROCEDURE — 99214 OFFICE O/P EST MOD 30 MIN: CPT | Performed by: FAMILY MEDICINE

## 2018-12-21 RX ORDER — DULOXETIN HYDROCHLORIDE 30 MG/1
30 CAPSULE, DELAYED RELEASE ORAL DAILY
Qty: 270 CAPSULE | Refills: 3
Start: 2018-12-21 | End: 2019-05-20 | Stop reason: ALTCHOICE

## 2018-12-21 ASSESSMENT — ENCOUNTER SYMPTOMS
VOMITING: 0
COUGH: 0
RHINORRHEA: 0
ABDOMINAL PAIN: 0
NAUSEA: 0
SHORTNESS OF BREATH: 0
BACK PAIN: 1
EYE DISCHARGE: 0
WHEEZING: 0
SORE THROAT: 0
DIARRHEA: 0
EYE REDNESS: 0

## 2018-12-21 NOTE — PATIENT INSTRUCTIONS
Decrease Cymbalta to 30 mg daily and see how things go. Patient Education        Sleep Studies: About This Test  What is it? Sleep studies are tests that watch what happens to your body during sleep. These studies usually are done in a sleep lab. Sleep labs are often located in hospitals. But sleep studies also can be done with portable equipment that you use at home. Why is this test done? Sleep studies are done to find sleep problems, including:  · Sleep apnea or excessive snoring. · Narcolepsy. · Nocturnal seizures. · REM behavior disorder (RBD). · Repeated muscle twitching of the feet, arms, or legs while you sleep. How can you prepare for the test?  · You may be asked to keep a sleep diary for 1 to 2 weeks before your sleep study. · Don't take any naps for 2 to 3 days before your test.  · You may be asked to avoid food or drinks with caffeine for a day or two before your test.  · Take a shower or bath before your test, but don't use sprays, oils, or gels on your hair. Don't wear makeup, fingernail polish, or fake nails. · Pack and take along a small overnight bag with personal items, such as a toothbrush, a comb, favorite pillows or blankets, and a book. You can wear your own nightclothes. What happens during the test?  · In the sleep lab, you will be in a private room, much like a hotel room. · Small pads or patches called electrodes will be placed on your head and body with a small amount of glue and tape. These will record things like brain activity, eye movement, oxygen levels, and snoring. · Soft elastic belts will be placed around your chest and belly to measure your breathing. · Your blood oxygen levels will be checked by a small clip (oximeter) placed either on the tip of your index finger or on your earlobe. · If you have sleep apnea, you may wear a mask that is connected to a continuous positive airway pressure (CPAP) machine.   · Depending on the type of test, you will be allowed quiet, dark, and cool. Use curtains, blinds, or a sleep mask to block out light. To block out noise, use earplugs, soothing music, or a \"white noise\" machine. Your evening and bedtime routine  · Create a relaxing bedtime routine. You might want to take a warm shower or bath, listen to soothing music, or drink a cup of noncaffeinated tea. · Go to bed at the same time every night. And get up at the same time every morning, even if you feel tired. What to avoid  · Limit caffeine (coffee, tea, caffeinated sodas) during the day, and don't have any for at least 4 to 6 hours before bedtime. · Don't drink alcohol before bedtime. Alcohol can cause you to wake up more often during the night. · Don't smoke or use tobacco, especially in the evening. Nicotine can keep you awake. · Don't take naps during the day, especially close to bedtime. · Don't lie in bed awake for too long. If you can't fall asleep, or if you wake up in the middle of the night and can't get back to sleep within 15 minutes or so, get out of bed and go to another room until you feel sleepy. · Don't take medicine right before bed that may keep you awake or make you feel hyper or energized. Your doctor can tell you if your medicine may do this and if you can take it earlier in the day. If you can't sleep  · Imagine yourself in a peaceful, pleasant scene. Focus on the details and feelings of being in a place that is relaxing. · Get up and do a quiet or boring activity until you feel sleepy. · Don't drink any liquids after 6 p.m. if you wake up often because you have to go to the bathroom. Where can you learn more? Go to https://[employer]. org and sign in to your [employer] account. Enter O798 in the [employer] box to learn more about \"Learning About Sleeping Well. \"     If you do not have an account, please click on the \"Sign Up Now\" link.   Current as of: December 7, 2017  Content Version: 11.8  © 7252-2247 Healthwise,

## 2018-12-21 NOTE — PROGRESS NOTES
educationalmaterials - see patient instructions. Discussed use, benefit, and side effectsof prescribed medications. All patient questions answered. Pt voiced understanding. Reviewed health maintenance. Instructed to continue current medications, diet andexercise. Patient agreed with treatment plan. Follow up as directed.      Electronicallysigned by Beth Conway MD on 12/21/2018 at 1:25 PM

## 2018-12-26 ENCOUNTER — HOSPITAL ENCOUNTER (OUTPATIENT)
Dept: PHYSICAL THERAPY | Age: 73
Setting detail: THERAPIES SERIES
Discharge: HOME | End: 2018-12-26
Payer: MEDICARE

## 2018-12-26 PROCEDURE — 97113 AQUATIC THERAPY/EXERCISES: CPT

## 2018-12-27 ENCOUNTER — APPOINTMENT (OUTPATIENT)
Dept: PHYSICAL THERAPY | Age: 73
End: 2018-12-27
Payer: MEDICARE

## 2018-12-31 RX ORDER — SIMVASTATIN 20 MG
TABLET ORAL
Qty: 90 TABLET | Refills: 3 | Status: SHIPPED | OUTPATIENT
Start: 2018-12-31 | End: 2019-12-16 | Stop reason: SDUPTHER

## 2019-01-03 ENCOUNTER — HOSPITAL ENCOUNTER (OUTPATIENT)
Dept: PHYSICAL THERAPY | Age: 74
Setting detail: THERAPIES SERIES
Discharge: HOME | End: 2019-01-03
Payer: MEDICARE

## 2019-01-03 PROCEDURE — 97113 AQUATIC THERAPY/EXERCISES: CPT

## 2019-01-07 ENCOUNTER — HOSPITAL ENCOUNTER (OUTPATIENT)
Dept: PHYSICAL THERAPY | Age: 74
Setting detail: THERAPIES SERIES
Discharge: HOME | End: 2019-01-07
Payer: MEDICARE

## 2019-01-07 PROCEDURE — 97113 AQUATIC THERAPY/EXERCISES: CPT

## 2019-01-11 ENCOUNTER — HOSPITAL ENCOUNTER (OUTPATIENT)
Dept: PHYSICAL THERAPY | Age: 74
Setting detail: THERAPIES SERIES
Discharge: HOME | End: 2019-01-11
Payer: MEDICARE

## 2019-01-11 PROCEDURE — 97113 AQUATIC THERAPY/EXERCISES: CPT

## 2019-01-16 ENCOUNTER — HOSPITAL ENCOUNTER (OUTPATIENT)
Dept: PHYSICAL THERAPY | Age: 74
Setting detail: THERAPIES SERIES
Discharge: HOME | End: 2019-01-16
Payer: MEDICARE

## 2019-01-16 PROCEDURE — 97113 AQUATIC THERAPY/EXERCISES: CPT

## 2019-01-21 ENCOUNTER — HOSPITAL ENCOUNTER (OUTPATIENT)
Dept: PHYSICAL THERAPY | Age: 74
Setting detail: THERAPIES SERIES
Discharge: HOME | End: 2019-01-21
Payer: MEDICARE

## 2019-01-21 PROCEDURE — 97113 AQUATIC THERAPY/EXERCISES: CPT

## 2019-01-25 ENCOUNTER — HOSPITAL ENCOUNTER (OUTPATIENT)
Dept: PHYSICAL THERAPY | Age: 74
Setting detail: THERAPIES SERIES
Discharge: HOME | End: 2019-01-25
Payer: MEDICARE

## 2019-01-25 PROCEDURE — 97113 AQUATIC THERAPY/EXERCISES: CPT

## 2019-01-30 ENCOUNTER — HOSPITAL ENCOUNTER (OUTPATIENT)
Dept: PHYSICAL THERAPY | Age: 74
Setting detail: THERAPIES SERIES
Discharge: HOME | End: 2019-01-30
Payer: MEDICARE

## 2019-02-04 ENCOUNTER — HOSPITAL ENCOUNTER (OUTPATIENT)
Dept: PHYSICAL THERAPY | Age: 74
Setting detail: THERAPIES SERIES
Discharge: HOME | End: 2019-02-04
Payer: MEDICARE

## 2019-02-04 PROCEDURE — 97113 AQUATIC THERAPY/EXERCISES: CPT

## 2019-02-08 ENCOUNTER — HOSPITAL ENCOUNTER (OUTPATIENT)
Dept: PHYSICAL THERAPY | Age: 74
Setting detail: THERAPIES SERIES
Discharge: HOME | End: 2019-02-08
Payer: MEDICARE

## 2019-02-08 PROCEDURE — 97113 AQUATIC THERAPY/EXERCISES: CPT

## 2019-02-13 ENCOUNTER — HOSPITAL ENCOUNTER (OUTPATIENT)
Dept: PHYSICAL THERAPY | Age: 74
Setting detail: THERAPIES SERIES
Discharge: HOME | End: 2019-02-13
Payer: MEDICARE

## 2019-02-13 PROCEDURE — 97113 AQUATIC THERAPY/EXERCISES: CPT

## 2019-02-20 ENCOUNTER — HOSPITAL ENCOUNTER (OUTPATIENT)
Dept: PHYSICAL THERAPY | Age: 74
Setting detail: THERAPIES SERIES
Discharge: HOME | End: 2019-02-20
Payer: MEDICARE

## 2019-02-20 PROCEDURE — 97113 AQUATIC THERAPY/EXERCISES: CPT

## 2019-03-27 ENCOUNTER — OFFICE VISIT (OUTPATIENT)
Dept: PRIMARY CARE CLINIC | Age: 74
End: 2019-03-27
Payer: MEDICARE

## 2019-03-27 ENCOUNTER — HOSPITAL ENCOUNTER (OUTPATIENT)
Age: 74
Setting detail: SPECIMEN
Discharge: HOME | End: 2019-03-27
Payer: MEDICARE

## 2019-03-27 VITALS
BODY MASS INDEX: 40.92 KG/M2 | OXYGEN SATURATION: 96 % | SYSTOLIC BLOOD PRESSURE: 136 MMHG | DIASTOLIC BLOOD PRESSURE: 88 MMHG | WEIGHT: 238.4 LBS | HEART RATE: 84 BPM

## 2019-03-27 DIAGNOSIS — E03.9 ACQUIRED HYPOTHYROIDISM: Chronic | ICD-10-CM

## 2019-03-27 DIAGNOSIS — I10 ESSENTIAL HYPERTENSION: Chronic | ICD-10-CM

## 2019-03-27 DIAGNOSIS — E03.9 ACQUIRED HYPOTHYROIDISM: Primary | Chronic | ICD-10-CM

## 2019-03-27 DIAGNOSIS — E66.01 CLASS 3 SEVERE OBESITY DUE TO EXCESS CALORIES WITHOUT SERIOUS COMORBIDITY WITH BODY MASS INDEX (BMI) OF 40.0 TO 44.9 IN ADULT (HCC): ICD-10-CM

## 2019-03-27 PROBLEM — E66.813 CLASS 3 SEVERE OBESITY DUE TO EXCESS CALORIES WITHOUT SERIOUS COMORBIDITY WITH BODY MASS INDEX (BMI) OF 40.0 TO 44.9 IN ADULT: Status: ACTIVE | Noted: 2019-03-27

## 2019-03-27 LAB
ABSOLUTE EOS #: 0.33 K/UL (ref 0–0.44)
ABSOLUTE IMMATURE GRANULOCYTE: <0.03 K/UL (ref 0–0.3)
ABSOLUTE LYMPH #: 2.58 K/UL (ref 1.1–3.7)
ABSOLUTE MONO #: 1 K/UL (ref 0.1–1.2)
ALBUMIN SERPL-MCNC: 4.3 G/DL (ref 3.5–5.2)
ALBUMIN/GLOBULIN RATIO: 1.5 (ref 1–2.5)
ALP BLD-CCNC: 71 U/L (ref 35–104)
ALT SERPL-CCNC: 33 U/L (ref 5–33)
ANION GAP SERPL CALCULATED.3IONS-SCNC: 11 MMOL/L (ref 9–17)
AST SERPL-CCNC: 25 U/L
BASOPHILS # BLD: 1 % (ref 0–2)
BASOPHILS ABSOLUTE: 0.07 K/UL (ref 0–0.2)
BILIRUB SERPL-MCNC: 0.58 MG/DL (ref 0.3–1.2)
BUN BLDV-MCNC: 16 MG/DL (ref 8–23)
BUN/CREAT BLD: ABNORMAL (ref 9–20)
CALCIUM SERPL-MCNC: 10.5 MG/DL (ref 8.6–10.4)
CHLORIDE BLD-SCNC: 105 MMOL/L (ref 98–107)
CO2: 26 MMOL/L (ref 20–31)
CREAT SERPL-MCNC: 1.15 MG/DL (ref 0.5–0.9)
DIFFERENTIAL TYPE: ABNORMAL
EOSINOPHILS RELATIVE PERCENT: 4 % (ref 1–4)
GFR AFRICAN AMERICAN: 56 ML/MIN
GFR NON-AFRICAN AMERICAN: 46 ML/MIN
GFR SERPL CREATININE-BSD FRML MDRD: ABNORMAL ML/MIN/{1.73_M2}
GFR SERPL CREATININE-BSD FRML MDRD: ABNORMAL ML/MIN/{1.73_M2}
GLUCOSE BLD-MCNC: 91 MG/DL (ref 70–99)
HCT VFR BLD CALC: 45.1 % (ref 36.3–47.1)
HEMOGLOBIN: 14.1 G/DL (ref 11.9–15.1)
IMMATURE GRANULOCYTES: 0 %
LYMPHOCYTES # BLD: 28 % (ref 24–43)
MCH RBC QN AUTO: 28.8 PG (ref 25.2–33.5)
MCHC RBC AUTO-ENTMCNC: 31.3 G/DL (ref 28.4–34.8)
MCV RBC AUTO: 92.2 FL (ref 82.6–102.9)
MONOCYTES # BLD: 11 % (ref 3–12)
NRBC AUTOMATED: 0 PER 100 WBC
PDW BLD-RTO: 14.5 % (ref 11.8–14.4)
PLATELET # BLD: 266 K/UL (ref 138–453)
PLATELET ESTIMATE: ABNORMAL
PMV BLD AUTO: 12 FL (ref 8.1–13.5)
POTASSIUM SERPL-SCNC: 4.6 MMOL/L (ref 3.7–5.3)
RBC # BLD: 4.89 M/UL (ref 3.95–5.11)
RBC # BLD: ABNORMAL 10*6/UL
SEG NEUTROPHILS: 56 % (ref 36–65)
SEGMENTED NEUTROPHILS ABSOLUTE COUNT: 5.15 K/UL (ref 1.5–8.1)
SODIUM BLD-SCNC: 142 MMOL/L (ref 135–144)
TOTAL PROTEIN: 7.1 G/DL (ref 6.4–8.3)
TSH SERPL DL<=0.05 MIU/L-ACNC: 2.34 MIU/L (ref 0.3–5)
WBC # BLD: 9.2 K/UL (ref 3.5–11.3)
WBC # BLD: ABNORMAL 10*3/UL

## 2019-03-27 PROCEDURE — 99213 OFFICE O/P EST LOW 20 MIN: CPT | Performed by: FAMILY MEDICINE

## 2019-03-27 ASSESSMENT — ENCOUNTER SYMPTOMS
SHORTNESS OF BREATH: 0
EYE REDNESS: 0
VOMITING: 0
NAUSEA: 0
DIARRHEA: 0
EYE DISCHARGE: 0
WHEEZING: 0
SORE THROAT: 0
RHINORRHEA: 0
COUGH: 0
ABDOMINAL PAIN: 0

## 2019-03-27 ASSESSMENT — PATIENT HEALTH QUESTIONNAIRE - PHQ9
1. LITTLE INTEREST OR PLEASURE IN DOING THINGS: 0
SUM OF ALL RESPONSES TO PHQ9 QUESTIONS 1 & 2: 0
SUM OF ALL RESPONSES TO PHQ QUESTIONS 1-9: 0
2. FEELING DOWN, DEPRESSED OR HOPELESS: 0
SUM OF ALL RESPONSES TO PHQ QUESTIONS 1-9: 0

## 2019-04-17 ENCOUNTER — OFFICE VISIT (OUTPATIENT)
Dept: PRIMARY CARE CLINIC | Age: 74
End: 2019-04-17
Payer: MEDICARE

## 2019-04-17 VITALS
BODY MASS INDEX: 39.65 KG/M2 | HEART RATE: 79 BPM | WEIGHT: 231 LBS | OXYGEN SATURATION: 95 % | SYSTOLIC BLOOD PRESSURE: 118 MMHG | DIASTOLIC BLOOD PRESSURE: 68 MMHG

## 2019-04-17 DIAGNOSIS — L91.8 INFLAMED SKIN TAG: Primary | ICD-10-CM

## 2019-04-17 DIAGNOSIS — R20.9 SKIN SENSATION DISTURBANCE: ICD-10-CM

## 2019-04-17 PROCEDURE — 11200 RMVL SKIN TAGS UP TO&INC 15: CPT | Performed by: FAMILY MEDICINE

## 2019-04-17 NOTE — PROGRESS NOTES
Thang Wells is a 74 y.o. female  Chief Complaint   Patient presents with    Procedure     Pt here for skin tag removal         Lesions on left posterior thigh and right breast.  Lesion on left shoulder fell off today. Lesions cleansed with alcohol. Lidocaine without epi used to numb lesions. RFC used to destroy lesions with no bleeding. Thigh lesion did bleed some but cautery eventually stopped it. Lesions covered with silvadene and bandaids. Wound care instructions and silvadene given given.

## 2019-05-20 ENCOUNTER — OFFICE VISIT (OUTPATIENT)
Dept: PRIMARY CARE CLINIC | Age: 74
End: 2019-05-20
Payer: MEDICARE

## 2019-05-20 VITALS
OXYGEN SATURATION: 96 % | DIASTOLIC BLOOD PRESSURE: 76 MMHG | WEIGHT: 234.6 LBS | SYSTOLIC BLOOD PRESSURE: 114 MMHG | HEART RATE: 79 BPM | BODY MASS INDEX: 40.27 KG/M2

## 2019-05-20 DIAGNOSIS — M54.16 LUMBAR RADICULOPATHY: Primary | Chronic | ICD-10-CM

## 2019-05-20 DIAGNOSIS — G89.4 CHRONIC PAIN DISORDER: Chronic | ICD-10-CM

## 2019-05-20 DIAGNOSIS — I10 ESSENTIAL HYPERTENSION: Chronic | ICD-10-CM

## 2019-05-20 PROCEDURE — 99213 OFFICE O/P EST LOW 20 MIN: CPT | Performed by: FAMILY MEDICINE

## 2019-05-20 ASSESSMENT — ENCOUNTER SYMPTOMS
VOMITING: 0
SHORTNESS OF BREATH: 0
COUGH: 0
NAUSEA: 0

## 2019-05-20 NOTE — PROGRESS NOTES
[address] [place] [address] PRIMARY CARE  [address] [place] [address]  Dept: [phone]    Mazin Ortiz is a 74 y.o. female who presents today for her medical conditions/complaintsas noted below. Chief Complaint   Patient presents with    1 Month Follow-Up     Pt states she is still having strange dreams.  Dizziness     x2-3 days. off and on.  Fatigue     Pt wants to know if she should be taking a vitamin        HPI:     HPI- dizziness is sudden and severe. Usually with head movement. Pt does not feel depressed, but is stressed about  situation. Has been off cymbalta for about a month. Pain maybe was a little worse and took mobic, but not overall a lot worse. LDL Cholesterol (mg/dL)   Date Value   09/18/2013 104 (H)   05/15/2012 86     LDL Calculated (mg/dL)   Date Value   06/18/2018 69   02/20/2017 66   02/18/2016 76       (goal LDL is <100)   AST (U/L)   Date Value   03/27/2019 25     ALT (U/L)   Date Value   03/27/2019 33     BUN (mg/dL)   Date Value   03/27/2019 16     BP Readings from Last 3 Encounters:   05/20/19 114/76   04/17/19 118/68   03/27/19 136/88          (goal 120/80)    Past Medical History:   Diagnosis Date    Arthritis     Colon polyp     Glaucoma     right eye    Hyperlipidemia     Hypertension     Spinal stenosis     Thyroid disease     hypothyroidism      Past Surgical History:   Procedure Laterality Date    BREAST SURGERY      open breast biopsy    CHOLECYSTECTOMY      COLONOSCOPY      COLONOSCOPY N/A 4/19/2018    COLONOSCOPY POLYPECTOMY SNARE HOT BIOPSY [facility]      JOINT REPLACEMENT         Family History   Problem Relation Age of Onset    Cancer Maternal Grandmother         malignant neoplasm       Social History     Tobacco Use    Smoking status: Never Smoker    Smokeless tobacco: Never Used   Substance Use Topics    Alcohol use:  Yes Alcohol/week: 0.0 oz      Current Outpatient Medications   Medication Sig Dispense Refill    simvastatin (ZOCOR) 20 MG tablet TAKE 1 TABLET DAILY AT     BEDTIME 90 tablet 3    losartan-hydrochlorothiazide (HYZAAR) 50-12.5 MG per tablet TAKE 1 TABLET DAILY 90 tablet 3    potassium chloride (KLOR-CON) 10 MEQ extended release tablet TAKE 2 TABLETS DAILY 180 tablet 2    levothyroxine (SYNTHROID) 112 MCG tablet TAKE 1 TABLET DAILY 90 tablet 3    Calcium-Vitamin D 500-125 MG-UNIT TABS tablet      brimonidine (ALPHAGAN) 0.2 % ophthalmic solution 1 drop every 8 hours      meloxicam (MOBIC) 7.5 MG tablet Take 1 tablet by mouth daily as needed      morphine sulfate ER (MS CONTIN) 15 MG CR tablet Take 15 mg by mouth daily       No current facility-administered medications for this visit. Allergies   Allergen Reactions    Ace Inhibitors      Adverse reaction: cough       Health Maintenance   Topic Date Due    Shingles Vaccine (2 of 3) 09/05/2011    TSH testing  03/27/2020    Potassium monitoring  03/27/2020    Creatinine monitoring  03/27/2020    DTaP/Tdap/Td vaccine (2 - Td) 07/11/2021    Lipid screen  06/18/2023    Colon cancer screen colonoscopy  04/19/2028    DEXA (modify frequency per FRAX score)  Completed    Flu vaccine  Completed    Pneumococcal 65+ years Vaccine  Completed       Subjective:      Review of Systems   Constitutional: Negative for chills and fever. Respiratory: Negative for cough and shortness of breath. Cardiovascular: Negative for chest pain and palpitations. Gastrointestinal: Negative for nausea and vomiting. Neurological: Positive for dizziness. Objective:     /76   Pulse 79   Wt 234 lb 9.6 oz (106.4 kg)   LMP  (Exact Date)   SpO2 96%   BMI 40.27 kg/m²   Physical Exam   Constitutional: She is oriented to person, place, and time. She appears well-developed and well-nourished. No distress. HENT:   Head: Normocephalic and atraumatic.    Mouth/Throat: Oropharynx is clear and moist.   Eyes: Pupils are equal, round, and reactive to light. Conjunctivae are normal. Right eye exhibits no discharge. Left eye exhibits no discharge. No scleral icterus. Neck: No tracheal deviation present. No thyromegaly present. Cardiovascular: Normal rate, regular rhythm and normal heart sounds. No carotid bruits   Pulmonary/Chest: Effort normal and breath sounds normal. No respiratory distress. She has no wheezes. Musculoskeletal: She exhibits no edema. Lymphadenopathy:     She has no cervical adenopathy. Neurological: She is alert and oriented to person, place, and time. Skin: Skin is warm. No rash noted. Psychiatric: She has a normal mood and affect. Her behavior is normal. Thought content normal.   Nursing note and vitals reviewed. Assessment:       Diagnosis Orders   1. Lumbar radiculopathy     2. Chronic pain disorder     3. Essential hypertension          Plan:      Return in about 3 months (around 8/20/2019). No orders of the defined types were placed in this encounter. No orders of the defined types were placed in this encounter. Patient given educationalmaterials - see patient instructions. Discussed use, benefit, and side effectsof prescribed medications. All patient questions answered. Pt voiced understanding. Reviewed health maintenance. Instructed to continue current medications, diet andexercise. Patient agreed with treatment plan. Follow up as directed.      Electronicallysigned by Aden Diallo MD on 5/20/2019 at 11:52 AM

## 2019-07-24 ENCOUNTER — OFFICE VISIT (OUTPATIENT)
Dept: PRIMARY CARE CLINIC | Age: 74
End: 2019-07-24
Payer: MEDICARE

## 2019-07-24 VITALS
SYSTOLIC BLOOD PRESSURE: 128 MMHG | OXYGEN SATURATION: 94 % | DIASTOLIC BLOOD PRESSURE: 80 MMHG | WEIGHT: 231.6 LBS | BODY MASS INDEX: 39.75 KG/M2 | HEART RATE: 89 BPM

## 2019-07-24 DIAGNOSIS — L08.9 LOCAL SKIN INFECTION: Primary | ICD-10-CM

## 2019-07-24 DIAGNOSIS — K59.09 OTHER CONSTIPATION: ICD-10-CM

## 2019-07-24 PROCEDURE — 99212 OFFICE O/P EST SF 10 MIN: CPT | Performed by: PHYSICIAN ASSISTANT

## 2019-07-24 RX ORDER — CEPHALEXIN 500 MG/1
500 CAPSULE ORAL 3 TIMES DAILY
Qty: 30 CAPSULE | Refills: 0 | Status: SHIPPED | OUTPATIENT
Start: 2019-07-24 | End: 2019-08-03

## 2019-07-24 ASSESSMENT — ENCOUNTER SYMPTOMS
EYES NEGATIVE: 1
CONSTIPATION: 1
RESPIRATORY NEGATIVE: 1
ABDOMINAL PAIN: 0
COLOR CHANGE: 1

## 2019-08-05 ENCOUNTER — OFFICE VISIT (OUTPATIENT)
Dept: PRIMARY CARE CLINIC | Age: 74
End: 2019-08-05
Payer: MEDICARE

## 2019-08-05 VITALS
SYSTOLIC BLOOD PRESSURE: 128 MMHG | BODY MASS INDEX: 39.93 KG/M2 | TEMPERATURE: 97.6 F | DIASTOLIC BLOOD PRESSURE: 82 MMHG | WEIGHT: 232.6 LBS | OXYGEN SATURATION: 98 % | HEART RATE: 89 BPM

## 2019-08-05 DIAGNOSIS — M10.9 ACUTE GOUT OF MULTIPLE SITES, UNSPECIFIED CAUSE: Primary | ICD-10-CM

## 2019-08-05 PROCEDURE — 99212 OFFICE O/P EST SF 10 MIN: CPT | Performed by: FAMILY MEDICINE

## 2019-08-05 RX ORDER — INDOMETHACIN 50 MG/1
50 CAPSULE ORAL 3 TIMES DAILY
Qty: 30 CAPSULE | Refills: 0 | Status: SHIPPED | OUTPATIENT
Start: 2019-08-05 | End: 2019-08-23 | Stop reason: ALTCHOICE

## 2019-08-05 RX ORDER — PREDNISONE 20 MG/1
20 TABLET ORAL 2 TIMES DAILY
Qty: 6 TABLET | Refills: 0 | Status: SHIPPED | OUTPATIENT
Start: 2019-08-05 | End: 2019-08-08

## 2019-08-05 NOTE — PATIENT INSTRUCTIONS
Patient Education        Gout: Care Instructions  Your Care Instructions    Gout is a form of arthritis caused by a buildup of uric acid crystals in a joint. It causes sudden attacks of pain, swelling, redness, and stiffness, usually in one joint, especially the big toe. Gout usually comes on without a cause. But it can be brought on by drinking alcohol (especially beer) or eating seafood and red meat. Taking certain medicines, such as diuretics or aspirin, also can bring on an attack of gout. Taking your medicines as prescribed and following up with your doctor regularly can help you avoid gout attacks in the future. Follow-up care is a key part of your treatment and safety. Be sure to make and go to all appointments, and call your doctor if you are having problems. It's also a good idea to know your test results and keep a list of the medicines you take. How can you care for yourself at home? · If the joint is swollen, put ice or a cold pack on the area for 10 to 20 minutes at a time. Put a thin cloth between the ice and your skin. · Prop up the sore limb on a pillow when you ice it or anytime you sit or lie down during the next 3 days. Try to keep it above the level of your heart. This will help reduce swelling. · Rest sore joints. Avoid activities that put weight or strain on the joints for a few days. Take short rest breaks from your regular activities during the day. · Take your medicines exactly as prescribed. Call your doctor if you think you are having a problem with your medicine. · Take pain medicines exactly as directed. ? If the doctor gave you a prescription medicine for pain, take it as prescribed. ? If you are not taking a prescription pain medicine, ask your doctor if you can take an over-the-counter medicine. · Eat less seafood and red meat. · Check with your doctor before drinking alcohol. · Losing weight, if you are overweight, may help reduce attacks of gout.  But do not go on a

## 2019-08-06 LAB — URIC ACID: 7.4 MG/DL (ref 2.6–7.2)

## 2019-08-23 ENCOUNTER — OFFICE VISIT (OUTPATIENT)
Dept: PRIMARY CARE CLINIC | Age: 74
End: 2019-08-23
Payer: MEDICARE

## 2019-08-23 ENCOUNTER — HOSPITAL ENCOUNTER (OUTPATIENT)
Age: 74
Setting detail: SPECIMEN
Discharge: HOME | End: 2019-08-23
Payer: MEDICARE

## 2019-08-23 VITALS
SYSTOLIC BLOOD PRESSURE: 118 MMHG | HEIGHT: 64 IN | BODY MASS INDEX: 38.24 KG/M2 | DIASTOLIC BLOOD PRESSURE: 74 MMHG | WEIGHT: 224 LBS | HEART RATE: 82 BPM

## 2019-08-23 DIAGNOSIS — I10 ESSENTIAL HYPERTENSION: Primary | Chronic | ICD-10-CM

## 2019-08-23 DIAGNOSIS — M54.16 LUMBAR RADICULOPATHY: Chronic | ICD-10-CM

## 2019-08-23 DIAGNOSIS — N30.01 ACUTE CYSTITIS WITH HEMATURIA: ICD-10-CM

## 2019-08-23 DIAGNOSIS — Z12.39 ENCOUNTER FOR SCREENING FOR MALIGNANT NEOPLASM OF BREAST: ICD-10-CM

## 2019-08-23 DIAGNOSIS — G89.4 CHRONIC PAIN DISORDER: Chronic | ICD-10-CM

## 2019-08-23 DIAGNOSIS — M10.079 ACUTE IDIOPATHIC GOUT OF FOOT, UNSPECIFIED LATERALITY: ICD-10-CM

## 2019-08-23 LAB
BILIRUBIN, POC: NEGATIVE
BLOOD URINE, POC: ABNORMAL
CLARITY, POC: ABNORMAL
COLOR, POC: YELLOW
GLUCOSE URINE, POC: NEGATIVE
KETONES, POC: NEGATIVE
LEUKOCYTE EST, POC: ABNORMAL
NITRITE, POC: NEGATIVE
PH, POC: 6.5
PROTEIN, POC: NEGATIVE
SPECIFIC GRAVITY, POC: 1.02
UROBILINOGEN, POC: ABNORMAL

## 2019-08-23 PROCEDURE — 81002 URINALYSIS NONAUTO W/O SCOPE: CPT | Performed by: FAMILY MEDICINE

## 2019-08-23 PROCEDURE — 99214 OFFICE O/P EST MOD 30 MIN: CPT | Performed by: FAMILY MEDICINE

## 2019-08-23 PROCEDURE — G0008 ADMIN INFLUENZA VIRUS VAC: HCPCS | Performed by: FAMILY MEDICINE

## 2019-08-23 PROCEDURE — 90653 IIV ADJUVANT VACCINE IM: CPT | Performed by: FAMILY MEDICINE

## 2019-08-23 RX ORDER — INDOMETHACIN 50 MG/1
50 CAPSULE ORAL 2 TIMES DAILY WITH MEALS
Qty: 6 CAPSULE | Refills: 0 | Status: SHIPPED | OUTPATIENT
Start: 2019-08-23 | End: 2020-10-23

## 2019-08-23 RX ORDER — ALLOPURINOL 100 MG/1
100 TABLET ORAL DAILY
Qty: 90 TABLET | Refills: 3 | Status: SHIPPED | OUTPATIENT
Start: 2019-08-23 | End: 2020-06-15

## 2019-08-23 RX ORDER — SULFAMETHOXAZOLE AND TRIMETHOPRIM 800; 160 MG/1; MG/1
1 TABLET ORAL 2 TIMES DAILY
Qty: 10 TABLET | Refills: 0 | Status: SHIPPED | OUTPATIENT
Start: 2019-08-23 | End: 2019-08-28

## 2019-08-23 ASSESSMENT — ENCOUNTER SYMPTOMS
VOMITING: 0
ABDOMINAL PAIN: 0
BACK PAIN: 1
WHEEZING: 0
NAUSEA: 0
DIARRHEA: 0
RHINORRHEA: 0
EYE REDNESS: 0
SORE THROAT: 0
COUGH: 0
SHORTNESS OF BREATH: 0
EYE DISCHARGE: 0

## 2019-08-23 NOTE — PROGRESS NOTES
Tobacco Use    Smoking status: Never Smoker    Smokeless tobacco: Never Used   Substance Use Topics    Alcohol use: Yes     Alcohol/week: 0.0 standard drinks      Current Outpatient Medications   Medication Sig Dispense Refill    indomethacin (INDOCIN) 50 MG capsule Take 1 capsule by mouth 2 times daily (with meals) for 3 days 6 capsule 0    allopurinol (ZYLOPRIM) 100 MG tablet Take 1 tablet by mouth daily 90 tablet 3    sulfamethoxazole-trimethoprim (BACTRIM DS) 800-160 MG per tablet Take 1 tablet by mouth 2 times daily for 5 days 10 tablet 0    simvastatin (ZOCOR) 20 MG tablet TAKE 1 TABLET DAILY AT     BEDTIME 90 tablet 3    losartan-hydrochlorothiazide (HYZAAR) 50-12.5 MG per tablet TAKE 1 TABLET DAILY 90 tablet 3    potassium chloride (KLOR-CON) 10 MEQ extended release tablet TAKE 2 TABLETS DAILY 180 tablet 2    levothyroxine (SYNTHROID) 112 MCG tablet TAKE 1 TABLET DAILY 90 tablet 3    Calcium-Vitamin D 500-125 MG-UNIT TABS tablet      brimonidine (ALPHAGAN) 0.2 % ophthalmic solution 1 drop every 8 hours      meloxicam (MOBIC) 7.5 MG tablet Take 1 tablet by mouth daily as needed      morphine sulfate ER (MS CONTIN) 15 MG CR tablet Take 15 mg by mouth daily       No current facility-administered medications for this visit. Allergies   Allergen Reactions    Ace Inhibitors      Adverse reaction: cough       Health Maintenance   Topic Date Due    Annual Wellness Visit (AWV)  11/11/2006    Shingles Vaccine (2 of 3) 09/05/2011    Flu vaccine (1) 09/01/2019    TSH testing  03/27/2020    Potassium monitoring  03/27/2020    Creatinine monitoring  03/27/2020    DTaP/Tdap/Td vaccine (2 - Td) 07/11/2021    Lipid screen  06/18/2023    Colon cancer screen colonoscopy  04/19/2028    DEXA (modify frequency per FRAX score)  Completed    Pneumococcal 65+ years Vaccine  Completed       Subjective:      Review of Systems   Constitutional: Negative for chills and fever.    HENT: Negative for

## 2019-08-25 LAB
CULTURE: ABNORMAL
Lab: ABNORMAL
SPECIMEN DESCRIPTION: ABNORMAL

## 2019-09-17 ENCOUNTER — HOSPITAL ENCOUNTER (OUTPATIENT)
Dept: WOMENS IMAGING | Age: 74
Discharge: HOME | End: 2019-09-19
Payer: MEDICARE

## 2019-09-17 DIAGNOSIS — Z12.39 ENCOUNTER FOR SCREENING FOR MALIGNANT NEOPLASM OF BREAST: ICD-10-CM

## 2019-09-17 PROCEDURE — 77063 BREAST TOMOSYNTHESIS BI: CPT

## 2019-10-07 ENCOUNTER — TELEPHONE (OUTPATIENT)
Dept: PRIMARY CARE CLINIC | Age: 74
End: 2019-10-07

## 2019-10-23 ENCOUNTER — OFFICE VISIT (OUTPATIENT)
Dept: PRIMARY CARE CLINIC | Age: 74
End: 2019-10-23
Payer: MEDICARE

## 2019-10-23 VITALS
HEART RATE: 93 BPM | DIASTOLIC BLOOD PRESSURE: 82 MMHG | OXYGEN SATURATION: 97 % | BODY MASS INDEX: 39.59 KG/M2 | SYSTOLIC BLOOD PRESSURE: 122 MMHG | WEIGHT: 229.2 LBS

## 2019-10-23 DIAGNOSIS — G89.4 CHRONIC PAIN DISORDER: Primary | Chronic | ICD-10-CM

## 2019-10-23 DIAGNOSIS — M54.16 LUMBAR RADICULOPATHY: Chronic | ICD-10-CM

## 2019-10-23 DIAGNOSIS — M15.9 PRIMARY OSTEOARTHRITIS INVOLVING MULTIPLE JOINTS: Chronic | ICD-10-CM

## 2019-10-23 PROCEDURE — 99213 OFFICE O/P EST LOW 20 MIN: CPT | Performed by: FAMILY MEDICINE

## 2019-10-23 ASSESSMENT — ENCOUNTER SYMPTOMS
COUGH: 0
DIARRHEA: 0
WHEEZING: 0
NAUSEA: 0
EYE DISCHARGE: 0
CONSTIPATION: 1
RHINORRHEA: 0
VOMITING: 0
SORE THROAT: 0
EYE REDNESS: 0
ABDOMINAL PAIN: 0

## 2019-11-08 DIAGNOSIS — M54.16 LUMBAR RADICULOPATHY: ICD-10-CM

## 2019-11-08 DIAGNOSIS — G89.4 CHRONIC PAIN DISORDER: Primary | ICD-10-CM

## 2019-11-08 RX ORDER — MORPHINE SULFATE 15 MG/1
15 TABLET, FILM COATED, EXTENDED RELEASE ORAL DAILY
Qty: 30 TABLET | Refills: 0 | Status: SHIPPED | OUTPATIENT
Start: 2019-11-08 | End: 2019-11-27 | Stop reason: SDUPTHER

## 2019-11-27 ENCOUNTER — OFFICE VISIT (OUTPATIENT)
Dept: PRIMARY CARE CLINIC | Age: 74
End: 2019-11-27
Payer: MEDICARE

## 2019-11-27 VITALS
WEIGHT: 230 LBS | BODY MASS INDEX: 39.73 KG/M2 | HEART RATE: 76 BPM | DIASTOLIC BLOOD PRESSURE: 80 MMHG | SYSTOLIC BLOOD PRESSURE: 124 MMHG

## 2019-11-27 DIAGNOSIS — G89.4 CHRONIC PAIN DISORDER: ICD-10-CM

## 2019-11-27 DIAGNOSIS — I10 ESSENTIAL HYPERTENSION: Primary | Chronic | ICD-10-CM

## 2019-11-27 DIAGNOSIS — M54.16 LUMBAR RADICULOPATHY: ICD-10-CM

## 2019-11-27 PROCEDURE — 99213 OFFICE O/P EST LOW 20 MIN: CPT | Performed by: FAMILY MEDICINE

## 2019-11-27 RX ORDER — MELOXICAM 7.5 MG/1
7.5 TABLET ORAL 2 TIMES DAILY PRN
Qty: 60 TABLET | Refills: 0 | Status: SHIPPED | OUTPATIENT
Start: 2019-11-27 | End: 2020-01-20

## 2019-11-27 RX ORDER — HYDROXYZINE HYDROCHLORIDE 25 MG/1
25 TABLET, FILM COATED ORAL 3 TIMES DAILY PRN
Qty: 30 TABLET | Refills: 0 | Status: SHIPPED | OUTPATIENT
Start: 2019-11-27 | End: 2019-12-07

## 2019-11-27 RX ORDER — MORPHINE SULFATE 15 MG/1
15 TABLET, FILM COATED, EXTENDED RELEASE ORAL DAILY
Qty: 30 TABLET | Refills: 0 | Status: SHIPPED | OUTPATIENT
Start: 2019-11-27 | End: 2019-12-27

## 2019-11-27 ASSESSMENT — ENCOUNTER SYMPTOMS
DIARRHEA: 0
SORE THROAT: 0
NAUSEA: 0
VOMITING: 0
EYE REDNESS: 0
COUGH: 0
SHORTNESS OF BREATH: 1
RHINORRHEA: 0
CONSTIPATION: 1
EYE DISCHARGE: 0
WHEEZING: 0
ABDOMINAL PAIN: 0

## 2019-12-05 RX ORDER — LOSARTAN POTASSIUM AND HYDROCHLOROTHIAZIDE 12.5; 5 MG/1; MG/1
1 TABLET ORAL DAILY
Qty: 90 TABLET | Refills: 3 | Status: SHIPPED | OUTPATIENT
Start: 2019-12-05 | End: 2019-12-11 | Stop reason: SDUPTHER

## 2019-12-11 RX ORDER — LOSARTAN POTASSIUM AND HYDROCHLOROTHIAZIDE 12.5; 5 MG/1; MG/1
1 TABLET ORAL DAILY
Qty: 90 TABLET | Refills: 3 | Status: SHIPPED | OUTPATIENT
Start: 2019-12-11 | End: 2019-12-16 | Stop reason: ALTCHOICE

## 2019-12-16 ENCOUNTER — TELEPHONE (OUTPATIENT)
Dept: PRIMARY CARE CLINIC | Age: 74
End: 2019-12-16

## 2019-12-16 DIAGNOSIS — I10 ESSENTIAL HYPERTENSION: Primary | Chronic | ICD-10-CM

## 2019-12-16 RX ORDER — SIMVASTATIN 20 MG
TABLET ORAL
Qty: 90 TABLET | Refills: 3 | Status: SHIPPED | OUTPATIENT
Start: 2019-12-16 | End: 2020-12-22

## 2019-12-16 RX ORDER — LOSARTAN POTASSIUM 50 MG/1
50 TABLET ORAL DAILY
Qty: 30 TABLET | Refills: 5 | Status: SHIPPED | OUTPATIENT
Start: 2019-12-16 | End: 2020-02-27 | Stop reason: SDUPTHER

## 2019-12-16 RX ORDER — HYDROCHLOROTHIAZIDE 12.5 MG/1
12.5 CAPSULE, GELATIN COATED ORAL EVERY MORNING
Qty: 30 CAPSULE | Refills: 5 | Status: SHIPPED | OUTPATIENT
Start: 2019-12-16 | End: 2020-02-27 | Stop reason: SDUPTHER

## 2019-12-17 ENCOUNTER — TELEPHONE (OUTPATIENT)
Dept: PRIMARY CARE CLINIC | Age: 74
End: 2019-12-17

## 2020-02-27 ENCOUNTER — OFFICE VISIT (OUTPATIENT)
Dept: PRIMARY CARE CLINIC | Age: 75
End: 2020-02-27
Payer: MEDICARE

## 2020-02-27 VITALS
DIASTOLIC BLOOD PRESSURE: 72 MMHG | BODY MASS INDEX: 41.35 KG/M2 | SYSTOLIC BLOOD PRESSURE: 110 MMHG | WEIGHT: 239.4 LBS | HEART RATE: 72 BPM

## 2020-02-27 PROBLEM — F33.41 RECURRENT MAJOR DEPRESSIVE DISORDER, IN PARTIAL REMISSION (HCC): Status: ACTIVE | Noted: 2020-02-27

## 2020-02-27 PROCEDURE — 99214 OFFICE O/P EST MOD 30 MIN: CPT | Performed by: FAMILY MEDICINE

## 2020-02-27 RX ORDER — LOSARTAN POTASSIUM 50 MG/1
50 TABLET ORAL DAILY
Qty: 90 TABLET | Refills: 3 | Status: SHIPPED | OUTPATIENT
Start: 2020-02-27 | End: 2021-02-01

## 2020-02-27 RX ORDER — HYDROCHLOROTHIAZIDE 12.5 MG/1
12.5 CAPSULE, GELATIN COATED ORAL EVERY MORNING
Qty: 90 CAPSULE | Refills: 3 | Status: SHIPPED | OUTPATIENT
Start: 2020-02-27 | End: 2021-02-01

## 2020-02-27 ASSESSMENT — ENCOUNTER SYMPTOMS
COUGH: 0
ABDOMINAL PAIN: 0
SHORTNESS OF BREATH: 1
EYE REDNESS: 0
DIARRHEA: 0
SORE THROAT: 0
EYE DISCHARGE: 0
VOMITING: 0
NAUSEA: 0
WHEEZING: 0
RHINORRHEA: 0

## 2020-02-27 ASSESSMENT — PATIENT HEALTH QUESTIONNAIRE - PHQ9
1. LITTLE INTEREST OR PLEASURE IN DOING THINGS: 1
SUM OF ALL RESPONSES TO PHQ QUESTIONS 1-9: 2
SUM OF ALL RESPONSES TO PHQ QUESTIONS 1-9: 2
2. FEELING DOWN, DEPRESSED OR HOPELESS: 1
SUM OF ALL RESPONSES TO PHQ9 QUESTIONS 1 & 2: 2

## 2020-02-27 NOTE — PROGRESS NOTES
[address] [facility] PRIMARY CARE  [address]  [facility] [address]  [facility]: [phone] is a 66 y.o. female who presents today for her medical conditions/complaintsas noted below. Chief Complaint   Patient presents with    Hypertension     Patient doesn't check bp at home    Medication Check       HPI:     HPI- pt feeling okay. No new issues. Except had some dizziness in November/ Dec but has gone away. Is working on exercising but hasn't been in the pool- only on the bike. Watching diet some  Not taking morphine- aches more but the meloxicam helps- only taking 1-2 times per week. Depression symptoms are stable- sometimes not sleeping well sometimes- more related to anxiety.       LDL Cholesterol (mg/dL)   Date Value   09/18/2013 104 (H)   05/15/2012 86     LDL Calculated (mg/dL)   Date Value   06/18/2018 69   02/20/2017 66   02/18/2016 76       (goal LDL is <100)   AST (U/L)   Date Value   03/27/2019 25     ALT (U/L)   Date Value   03/27/2019 33     BUN (mg/dL)   Date Value   03/27/2019 16     BP Readings from Last 3 Encounters:   02/27/20 110/72   11/27/19 124/80   10/23/19 122/82          (goal 120/80)    Past Medical History:   Diagnosis Date    Arthritis     Colon polyp     Glaucoma     right eye    Hyperlipidemia     Hypertension     Spinal stenosis     Thyroid disease     hypothyroidism      Past Surgical History:   Procedure Laterality Date    BREAST SURGERY      open breast biopsy    CHOLECYSTECTOMY      COLONOSCOPY      COLONOSCOPY N/A 4/19/2018    COLONOSCOPY POLYPECTOMY SNARE HOT BIOPSY performed by [facility]uy [facility]ain [facility] at [facility]      JOINT REPLACEMENT         Family History   Problem Relation Age of Onset    Cancer Maternal Grandmother         malignant neoplasm       Social History     Tobacco Use    Smoking status: Never Smoker    Smokeless tobacco: Never Used   Substance Use Topics    Alcohol use: Yes     Alcohol/week: 0.0 standard drinks      Current Outpatient Medications   Medication Sig Dispense Refill    losartan (COZAAR) 50 MG tablet Take 1 tablet by mouth daily 90 tablet 3    hydrochlorothiazide (MICROZIDE) 12.5 MG capsule Take 1 capsule by mouth every morning 90 capsule 3    meloxicam (MOBIC) 7.5 MG tablet TAKE 1 TABLET BY MOUTH TWO TIMES A DAY AS NEEDED FOR PAIN 60 tablet 3    simvastatin (ZOCOR) 20 MG tablet TAKE 1 TABLET DAILY AT     BEDTIME 90 tablet 3    levothyroxine (SYNTHROID) 112 MCG tablet TAKE 1 TABLET DAILY 90 tablet 3    potassium chloride (KLOR-CON) 10 MEQ extended release tablet TAKE 2 TABLETS DAILY 180 tablet 3    allopurinol (ZYLOPRIM) 100 MG tablet Take 1 tablet by mouth daily 90 tablet 3    Calcium-Vitamin D 500-125 MG-UNIT TABS tablet      brimonidine (ALPHAGAN) 0.2 % ophthalmic solution 1 drop every 8 hours      indomethacin (INDOCIN) 50 MG capsule Take 1 capsule by mouth 2 times daily (with meals) for 3 days 6 capsule 0     No current facility-administered medications for this visit. Allergies   Allergen Reactions    Ace Inhibitors      Adverse reaction: cough       Health Maintenance   Topic Date Due    Shingles Vaccine (2 of 3) 09/05/2011    Annual Wellness Visit (AWV)  05/29/2019    Lipid screen  06/18/2019    TSH testing  03/27/2020    Potassium monitoring  03/27/2020    Creatinine monitoring  03/27/2020    DTaP/Tdap/Td vaccine (2 - Td) 07/11/2021    DEXA (modify frequency per FRAX score)  Completed    Flu vaccine  Completed    Pneumococcal 65+ years Vaccine  Completed    Hepatitis A vaccine  Aged Out    Hepatitis B vaccine  Aged Out    Hib vaccine  Aged Out    Meningococcal (ACWY) vaccine  Aged Out       Subjective:      Review of Systems   Constitutional: Negative for chills and fever. HENT: Negative for congestion, rhinorrhea and sore throat. Eyes: Negative for discharge and redness.    Respiratory: Positive for shortness of 40.0 to 44.9 in adult [facility])     4. Essential hypertension  losartan (COZAAR) 50 MG tablet    hydrochlorothiazide (MICROZIDE) 12.5 MG capsule    Comprehensive Metabolic Panel    CBC Auto Differential    Lipid Panel    TSH without Reflex        Plan:      Return in about 3 months (around 5/27/2020) for medication f/u. Orders Placed This Encounter   Procedures    Comprehensive Metabolic Panel     Standing Status:   Future     Standing Expiration Date:   2/27/2021    CBC Auto Differential     Standing Status:   Future     Standing Expiration Date:   2/27/2021    Lipid Panel     Standing Status:   Future     Standing Expiration Date:   2/27/2021     Order Specific Question:   Is Patient Fasting?/# of Hours     Answer:   15    TSH without Reflex     Standing Status:   Future     Standing Expiration Date:   2/27/2021     Orders Placed This Encounter   Medications    losartan (COZAAR) 50 MG tablet     Sig: Take 1 tablet by mouth daily     Dispense:  90 tablet     Refill:  3    hydrochlorothiazide (MICROZIDE) 12.5 MG capsule     Sig: Take 1 capsule by mouth every morning     Dispense:  90 capsule     Refill:  3       Patient given educationalmaterials - see patient instructions. Discussed use, benefit, and side effectsof prescribed medications. All patient questions answered. Pt voiced understanding. Reviewed health maintenance. Instructed to continue current medications, diet andexercise. Patient agreed with treatment plan. Follow up as directed.      Electronicallysigned by Glenroy Solis MD on 2/27/2020 at 11:02 AM

## 2020-05-29 LAB
ABSOLUTE BASO #: 0.1 X10E9/L (ref 0–0.9)
ABSOLUTE EOS #: 0.4 X10E9/L (ref 0–0.4)
ABSOLUTE LYMPH #: 2 X10E9/L (ref 1–3.5)
ABSOLUTE MONO #: 0.7 X10E9/L (ref 0–0.9)
ABSOLUTE NEUT #: 3.3 X10E9/L (ref 1.5–6.6)
ALBUMIN SERPL-MCNC: 4 G/DL (ref 3.2–5.3)
ALK PHOSPHATASE: 65 U/L (ref 39–130)
ALT SERPL-CCNC: 26 U/L (ref 0–31)
ANION GAP SERPL CALCULATED.3IONS-SCNC: 7 MMOL/L (ref 5–15)
AST SERPL-CCNC: 20 U/L (ref 0–41)
BASOPHILS RELATIVE PERCENT: 0.9 %
BILIRUB SERPL-MCNC: 0.7 MG/DL (ref 0.3–1.2)
BUN BLDV-MCNC: 20 MG/DL (ref 5–27)
CALCIUM SERPL-MCNC: 10 MG/DL (ref 8.5–10.5)
CHLORIDE BLD-SCNC: 109 MMOL/L (ref 98–109)
CHOLESTEROL/HDL RATIO: 3 (ref 1–5)
CHOLESTEROL: 160 MG/DL (ref 150–200)
CO2: 27 MMOL/L (ref 22–32)
CREAT SERPL-MCNC: 1.24 MG/DL (ref 0.4–1)
EGFR AFRICAN AMERICAN: 51 ML/MIN/1.73SQ.M
EGFR IF NONAFRICAN AMERICAN: 42 ML/MIN/1.73SQ.M
EOSINOPHILS RELATIVE PERCENT: 6.3 %
GLUCOSE: 93 MG/DL (ref 65–99)
HCT VFR BLD CALC: 41.2 % (ref 34–48)
HDLC SERPL-MCNC: 54 MG/DL
HEMOGLOBIN: 13.5 G/DL (ref 11.7–16.1)
LDL CHOLESTEROL CALCULATED: 87 MG/DL
LYMPHOCYTE %: 31.3 %
MCH RBC QN AUTO: 29.6 PG (ref 27–35)
MCHC RBC AUTO-ENTMCNC: 32.7 G/DL (ref 31–36)
MCV RBC AUTO: 90 FL (ref 81–101)
MONOCYTES # BLD: 11.1 %
NEUTROPHILS RELATIVE PERCENT: 50.4 %
PDW BLD-RTO: 14.4 % (ref 11.5–14.7)
PLATELETS: 228 X10E9/L (ref 150–450)
PMV BLD AUTO: 9.5 FL (ref 7–12)
POTASSIUM SERPL-SCNC: 3.9 MMOL/L (ref 3.5–5)
RBC: 4.56 X10E12/L (ref 3.3–5.5)
SODIUM BLD-SCNC: 143 MMOL/L (ref 134–146)
TOTAL PROTEIN: 6.6 G/DL (ref 6–8)
TRIGL SERPL-MCNC: 95 MG/DL (ref 27–150)
TSH SERPL DL<=0.05 MIU/L-ACNC: 1.17 UIU/ML (ref 0.49–4.67)
VLDLC SERPL CALC-MCNC: 19 MG/DL (ref 0–30)
WBC: 6.5 X10E9/L (ref 4–11.8)

## 2020-06-03 ENCOUNTER — OFFICE VISIT (OUTPATIENT)
Dept: PRIMARY CARE CLINIC | Age: 75
End: 2020-06-03
Payer: MEDICARE

## 2020-06-03 VITALS
TEMPERATURE: 96.9 F | DIASTOLIC BLOOD PRESSURE: 82 MMHG | BODY MASS INDEX: 41.66 KG/M2 | WEIGHT: 241.2 LBS | HEART RATE: 90 BPM | OXYGEN SATURATION: 95 % | SYSTOLIC BLOOD PRESSURE: 115 MMHG

## 2020-06-03 PROBLEM — N30.01 ACUTE CYSTITIS WITH HEMATURIA: Status: RESOLVED | Noted: 2019-08-23 | Resolved: 2020-06-03

## 2020-06-03 PROBLEM — M10.079 ACUTE IDIOPATHIC GOUT OF FOOT: Status: RESOLVED | Noted: 2019-08-23 | Resolved: 2020-06-03

## 2020-06-03 PROBLEM — L91.8 INFLAMED SKIN TAG: Status: RESOLVED | Noted: 2017-05-15 | Resolved: 2020-06-03

## 2020-06-03 PROCEDURE — G0438 PPPS, INITIAL VISIT: HCPCS | Performed by: FAMILY MEDICINE

## 2020-06-03 ASSESSMENT — PATIENT HEALTH QUESTIONNAIRE - PHQ9
SUM OF ALL RESPONSES TO PHQ QUESTIONS 1-9: 0
SUM OF ALL RESPONSES TO PHQ QUESTIONS 1-9: 9
SUM OF ALL RESPONSES TO PHQ QUESTIONS 1-9: 0

## 2020-06-03 NOTE — PROGRESS NOTES
Arthritis     Colon polyp     Glaucoma     right eye    Hyperlipidemia     Hypertension     Inflamed skin tag 5/15/2017    Spinal stenosis     Thyroid disease     hypothyroidism       Past Surgical History:   Procedure Laterality Date    BREAST SURGERY Left     open breast biopsy    CHOLECYSTECTOMY      COLONOSCOPY      COLONOSCOPY N/A 4/19/2018    COLONOSCOPY POLYPECTOMY SNARE HOT BIOPSY performed by Janiya Epstein MD at [facility]      TOTAL KNEE ARTHROPLASTY Bilateral          Family History   Problem Relation Age of Onset    Cancer Maternal Grandmother         ?uterine?  Diabetes Mother     COPD Father         ? maybe heart also       CareTeam (Including outside providers/suppliers regularly involved in providing care):   Patient Care Team:  Shantell Kincaid MD as PCP - General  Shantell Kincaid MD as PCP [facility]    Wt Readings from Last 3 Encounters:   06/03/20 241 lb 3.2 oz (109.4 kg)   02/27/20 239 lb 6.4 oz (108.6 kg)   11/27/19 230 lb (104.3 kg)     Vitals:    06/03/20 1314   BP: 115/82   Pulse: 90   Temp: 96.9 °F (36.1 °C)   SpO2: 95%   Weight: 241 lb 3.2 oz (109.4 kg)     Body mass index is 41.66 kg/m². Based upon direct observation of the patient, evaluation of cognition reveals recent and remote memory intact. Physical Exam    Patient's complete Health Risk Assessment and screening values have been reviewed and are found in Flowsheets. The following problems were reviewed today and where indicated follow up appointments were made and/or referrals ordered. Positive Risk Factor Screenings with Interventions:     General Health:  General  In general, how would you say your health is?: Good  In the past 7 days, have you experienced any of the following?  New or Increased Pain, New or Increased Fatigue, Loneliness, Social Isolation, Stress or Anger?: (!) Loneliness, Social Isolation  Do you get the social and emotional Potassium monitoring  05/29/2021    Creatinine monitoring  05/29/2021    DTaP/Tdap/Td vaccine (2 - Td) 07/11/2021    DEXA (modify frequency per FRAX score)  Completed    Flu vaccine  Completed    Pneumococcal 65+ years Vaccine  Completed    Hepatitis A vaccine  Aged Out    Hepatitis B vaccine  Aged Out    Hib vaccine  Aged Out    Meningococcal (ACWY) vaccine  Aged Out     Recommendations for Codeship Due: see orders and patient instructions/AVS.  . Recommended screening schedule for the next 5-10 years is provided to the patient in written form: see Patient Instructions/AVS.    [facility] (ACP) Physician/NP/PA (Provider) Rohit Martinez      Date of ACP Conversation: 6/3/2020    Conversation Conducted with:   Patient with Decision Making [facility] Maker:    Current Designated Health Care Decision Maker:      Note: Assess and validate information in current ACP documents, as indicated. If no Authorized Decision Maker has previously been identified, then patient chooses [address]:  \"Who would you like to name as your primary health care decision-maker? \"             Name: Swathi Quan        Relationship:          Phone number: -  \"Can this person be reached easily? \" Yes  \"Who would you like to name as your back-up decision maker? \"   Name: Payton Solomon        Relationship: son         Phone number: [phone]  [place] this person be reached easily? \" Yes- lives in [place] and travels, but usually answers     Note: If the relationship of these Decision-Makers to the patient does NOT follow your state's Next of Kin hierarchy, recommend that patient complete ACP document that meets state-specific requirements to allow them to act on the patient's behalf when appropriate. Care Preferences:    Hospitalization:   \"If your health worsens and it becomes clear that your chance of recovery is unlikely, what would your preference be regarding

## 2020-06-03 NOTE — PATIENT INSTRUCTIONS
Personalized Preventive Plan for Nataly Key - 6/3/2020  Medicare offers a range of preventive health benefits. Some of the tests and screenings are paid in full while other may be subject to a deductible, co-insurance, and/or copay. Some of these benefits include a comprehensive review of your medical history including lifestyle, illnesses that may run in your family, and various assessments and screenings as appropriate. After reviewing your medical record and screening and assessments performed today your provider may have ordered immunizations, labs, imaging, and/or referrals for you. A list of these orders (if applicable) as well as your Preventive Care list are included within your After Visit Summary for your review. Other Preventive Recommendations:    · A preventive eye exam performed by an eye specialist is recommended every 1-2 years to screen for glaucoma; cataracts, macular degeneration, and other eye disorders. · A preventive dental visit is recommended every 6 months. · Try to get at least 150 minutes of exercise per week or 10,000 steps per day on a pedometer . · Order or download the FREE \"Exercise & Physical Activity: Your Everyday Guide\" from [employer]. Call 4-774.990.2655 or search [employer] online. · You need 7173-6943 mg of calcium and 2480-6263 IU of vitamin D per day. It is possible to meet your calcium requirement with diet alone, but a vitamin D supplement is usually necessary to meet this goal.  · When exposed to the sun, use a sunscreen that protects against both UVA and UVB radiation with an SPF of 30 or greater. Reapply every 2 to 3 hours or after sweating, drying off with a towel, or swimming. · Always wear a seat belt when traveling in a car. Always wear a helmet when riding a bicycle or motorcycle.

## 2020-06-15 RX ORDER — ALLOPURINOL 100 MG/1
TABLET ORAL
Qty: 90 TABLET | Refills: 3 | Status: SHIPPED | OUTPATIENT
Start: 2020-06-15 | End: 2021-07-19

## 2020-08-24 RX ORDER — POTASSIUM CHLORIDE 750 MG/1
TABLET, FILM COATED, EXTENDED RELEASE ORAL
Qty: 180 TABLET | Refills: 3 | Status: SHIPPED | OUTPATIENT
Start: 2020-08-24 | End: 2021-09-20

## 2020-09-09 ENCOUNTER — OFFICE VISIT (OUTPATIENT)
Dept: PRIMARY CARE CLINIC | Age: 75
End: 2020-09-09
Payer: MEDICARE

## 2020-09-09 VITALS
OXYGEN SATURATION: 97 % | SYSTOLIC BLOOD PRESSURE: 130 MMHG | WEIGHT: 214 LBS | BODY MASS INDEX: 36.96 KG/M2 | DIASTOLIC BLOOD PRESSURE: 78 MMHG | HEART RATE: 76 BPM | TEMPERATURE: 98.1 F

## 2020-09-09 PROCEDURE — 99213 OFFICE O/P EST LOW 20 MIN: CPT | Performed by: FAMILY MEDICINE

## 2020-09-09 PROCEDURE — G0008 ADMIN INFLUENZA VIRUS VAC: HCPCS | Performed by: FAMILY MEDICINE

## 2020-09-09 PROCEDURE — 90694 VACC AIIV4 NO PRSRV 0.5ML IM: CPT | Performed by: FAMILY MEDICINE

## 2020-09-09 ASSESSMENT — ENCOUNTER SYMPTOMS
SORE THROAT: 0
COUGH: 0
DIARRHEA: 0
EYE DISCHARGE: 0
WHEEZING: 0
RHINORRHEA: 0
ABDOMINAL PAIN: 0
SHORTNESS OF BREATH: 1
NAUSEA: 0
VOMITING: 0
EYE REDNESS: 0

## 2020-09-09 ASSESSMENT — PATIENT HEALTH QUESTIONNAIRE - PHQ9
SUM OF ALL RESPONSES TO PHQ9 QUESTIONS 1 & 2: 0
SUM OF ALL RESPONSES TO PHQ QUESTIONS 1-9: 0
2. FEELING DOWN, DEPRESSED OR HOPELESS: 0
1. LITTLE INTEREST OR PLEASURE IN DOING THINGS: 0
SUM OF ALL RESPONSES TO PHQ QUESTIONS 1-9: 0

## 2020-09-09 NOTE — PROGRESS NOTES
[address] [facility] PRIMARY CARE  [address] [facility] [address]  Dept: [phone] Jigna Mcclellan is a 66 y.o. female who presents today for her medical conditions/complaintsas noted below. Chief Complaint   Patient presents with    Medication Check       HPI:     HPI-not able to walk very far- no stamina and gets out of breath fast.  Not much pain. Feeling okay otherwise but just can't do much. LDL Cholesterol (mg/dL)   Date Value   09/18/2013 104 (H)   05/15/2012 86     LDL Calculated (mg/dL)   Date Value   05/29/2020 87   06/18/2018 69   02/20/2017 66       (goal LDL is <100)   AST (U/L)   Date Value   05/29/2020 20     ALT (U/L)   Date Value   05/29/2020 26     BUN (mg/dL)   Date Value   05/29/2020 20     BP Readings from Last 3 Encounters:   09/09/20 130/78   06/03/20 115/82   02/27/20 110/72          (goal 120/80)    Past Medical History:   Diagnosis Date    Acute cystitis with hematuria 8/23/2019    Acute idiopathic gout of foot 8/23/2019    Arthritis     Colon polyp     Glaucoma     right eye    Hyperlipidemia     Hypertension     Inflamed skin tag 5/15/2017    Spinal stenosis     Thyroid disease     hypothyroidism      Past Surgical History:   Procedure Laterality Date    BREAST SURGERY Left     open breast biopsy    CHOLECYSTECTOMY      COLONOSCOPY      COLONOSCOPY N/A 4/19/2018    COLONOSCOPY POLYPECTOMY SNARE HOT BIOPSY performed by Judy Messina MD at [address]      TOTAL KNEE ARTHROPLASTY Bilateral        Family History   Problem Relation Age of Onset    Cancer Maternal Grandmother         ?uterine?     Diabetes Mother     COPD Father         ? maybe heart also       Social History     Tobacco Use    Smoking status: Never Smoker    Smokeless tobacco: Never Used   Substance Use Topics    Alcohol use: Not Currently     Alcohol/week: 0.0 standard drinks     Comment: 10 years ago was last glass of wine      Current Outpatient Medications   Medication Sig Dispense Refill    potassium chloride (KLOR-CON) 10 MEQ extended release tablet TAKE 2 TABLETS DAILY 180 tablet 3    allopurinol (ZYLOPRIM) 100 MG tablet TAKE 1 TABLET DAILY 90 tablet 3    losartan (COZAAR) 50 MG tablet Take 1 tablet by mouth daily 90 tablet 3    hydrochlorothiazide (MICROZIDE) 12.5 MG capsule Take 1 capsule by mouth every morning 90 capsule 3    meloxicam (MOBIC) 7.5 MG tablet TAKE 1 TABLET BY MOUTH TWO TIMES A DAY AS NEEDED FOR PAIN 60 tablet 3    simvastatin (ZOCOR) 20 MG tablet TAKE 1 TABLET DAILY AT     BEDTIME 90 tablet 3    levothyroxine (SYNTHROID) 112 MCG tablet TAKE 1 TABLET DAILY 90 tablet 3    Calcium-Vitamin D 500-125 MG-UNIT TABS tablet      brimonidine (ALPHAGAN) 0.2 % ophthalmic solution 1 drop every 8 hours      indomethacin (INDOCIN) 50 MG capsule Take 1 capsule by mouth 2 times daily (with meals) for 3 days (Patient not taking: Reported on 9/9/2020) 6 capsule 0     No current facility-administered medications for this visit. Allergies   Allergen Reactions    Ace Inhibitors      Adverse reaction: cough       Health Maintenance   Topic Date Due    Shingles Vaccine (2 of 3) 09/05/2011    Flu vaccine (1) 09/01/2020    Lipid screen  05/29/2021    TSH testing  05/29/2021    Potassium monitoring  05/29/2021    Creatinine monitoring  05/29/2021    Annual Wellness Visit (AWV)  06/04/2021    DTaP/Tdap/Td vaccine (2 - Td) 07/11/2021    DEXA (modify frequency per FRAX score)  Completed    Pneumococcal 65+ years Vaccine  Completed    Hepatitis A vaccine  Aged Out    Hepatitis B vaccine  Aged Out    Hib vaccine  Aged Out    Meningococcal (ACWY) vaccine  Aged Out       Subjective:      Review of Systems   Constitutional: Negative for chills and fever. HENT: Negative for rhinorrhea and sore throat. Eyes: Negative for discharge and redness.    Respiratory: Positive for shortness of breath (when moves around a lot). Negative for cough and wheezing. Cardiovascular: Negative for chest pain and palpitations. Gastrointestinal: Negative for abdominal pain, diarrhea, nausea and vomiting. Genitourinary: Negative for dysuria and frequency. Musculoskeletal: Negative for arthralgias and myalgias. Neurological: Positive for dizziness (occas episodes recently). Negative for light-headedness and headaches. Psychiatric/Behavioral: Positive for sleep disturbance (occas wakes up and can't go back to sleep). Objective:     /78   Pulse 76   Temp 98.1 °F (36.7 °C)   Wt 214 lb (97.1 kg)   SpO2 97%   BMI 36.96 kg/m²   Physical Exam  Vitals signs and nursing note reviewed. Constitutional:       General: She is not in acute distress. Appearance: She is well-developed. She is not ill-appearing. HENT:      Head: Normocephalic and atraumatic. Right Ear: External ear normal.      Left Ear: External ear normal.   Eyes:      General: No scleral icterus. Right eye: No discharge. Left eye: No discharge. Conjunctiva/sclera: Conjunctivae normal.      Pupils: Pupils are equal, round, and reactive to light. Neck:      Thyroid: No thyromegaly. Trachea: No tracheal deviation. Cardiovascular:      Rate and Rhythm: Normal rate and regular rhythm. Heart sounds: Normal heart sounds. Pulmonary:      Effort: Pulmonary effort is normal. No respiratory distress. Breath sounds: Normal breath sounds. No wheezing. Lymphadenopathy:      Cervical: No cervical adenopathy. Skin:     General: Skin is warm. Findings: No rash. Neurological:      Mental Status: She is alert and oriented to person, place, and time. Psychiatric:         Mood and Affect: Mood normal.         Behavior: Behavior normal.         Thought Content: Thought content normal.         Assessment:       Diagnosis Orders   1.  Need for influenza vaccination  INFLUENZA, QUADV, ADJUVANTED, 65 YRS =, IM, PF, PREFILL

## 2020-10-11 ENCOUNTER — TELEPHONE (OUTPATIENT)
Dept: PRIMARY CARE CLINIC | Age: 75
End: 2020-10-11

## 2020-10-11 RX ORDER — CEPHALEXIN 500 MG/1
500 CAPSULE ORAL 3 TIMES DAILY
Qty: 21 CAPSULE | Refills: 0 | Status: SHIPPED | OUTPATIENT
Start: 2020-10-11 | End: 2021-10-13

## 2020-10-11 NOTE — TELEPHONE ENCOUNTER
Called patient for page that I had received about concern for possible bladder infection. She has had 3 in the last few years. She states it feels like a normal UTI. Urge to go often. No fevers no back pain. She has taken Pyridium which gives mild relief for a little bit but she wants to cure the infection as well. Asking if we can send in antibiotics at this time. Keflex 500 mg 3 times daily for 7 days was sent.

## 2020-10-13 RX ORDER — LEVOTHYROXINE SODIUM 112 UG/1
TABLET ORAL
Qty: 90 TABLET | Refills: 3 | Status: SHIPPED | OUTPATIENT
Start: 2020-10-13 | End: 2021-10-25

## 2020-10-23 ENCOUNTER — OFFICE VISIT (OUTPATIENT)
Dept: PRIMARY CARE CLINIC | Age: 75
End: 2020-10-23
Payer: MEDICARE

## 2020-10-23 ENCOUNTER — HOSPITAL ENCOUNTER (OUTPATIENT)
Age: 75
Setting detail: SPECIMEN
Discharge: HOME | End: 2020-10-23
Payer: MEDICARE

## 2020-10-23 VITALS
BODY MASS INDEX: 41.8 KG/M2 | SYSTOLIC BLOOD PRESSURE: 122 MMHG | OXYGEN SATURATION: 98 % | TEMPERATURE: 98.8 F | WEIGHT: 242 LBS | DIASTOLIC BLOOD PRESSURE: 74 MMHG | HEART RATE: 65 BPM

## 2020-10-23 LAB
BILIRUBIN, POC: ABNORMAL
BLOOD URINE, POC: ABNORMAL
CLARITY, POC: ABNORMAL
COLOR, POC: YELLOW
GLUCOSE URINE, POC: ABNORMAL
KETONES, POC: ABNORMAL
LEUKOCYTE EST, POC: ABNORMAL
NITRITE, POC: POSITIVE
PH, POC: 6.5
PROTEIN, POC: 30
SPECIFIC GRAVITY, POC: 1.02
UROBILINOGEN, POC: 0.2

## 2020-10-23 PROCEDURE — 81003 URINALYSIS AUTO W/O SCOPE: CPT | Performed by: FAMILY MEDICINE

## 2020-10-23 PROCEDURE — 99213 OFFICE O/P EST LOW 20 MIN: CPT | Performed by: FAMILY MEDICINE

## 2020-10-23 RX ORDER — CIPROFLOXACIN 250 MG/1
250 TABLET, FILM COATED ORAL 2 TIMES DAILY
Qty: 14 TABLET | Refills: 0 | Status: SHIPPED | OUTPATIENT
Start: 2020-10-23 | End: 2020-10-30

## 2020-10-23 NOTE — PATIENT INSTRUCTIONS
Patient Education        Urinary Tract Infection in Women: Care Instructions  Your Care Instructions     A urinary tract infection, or UTI, is a general term for an infection anywhere between the kidneys and the urethra (where urine comes out). Most UTIs are bladder infections. They often cause pain or burning when you urinate. UTIs are caused by bacteria and can be cured with antibiotics. Be sure to complete your treatment so that the infection goes away. Follow-up care is a key part of your treatment and safety. Be sure to make and go to all appointments, and call your doctor if you are having problems. It's also a good idea to know your test results and keep a list of the medicines you take. How can you care for yourself at home? · Take your antibiotics as directed. Do not stop taking them just because you feel better. You need to take the full course of antibiotics. · Drink extra water and other fluids for the next day or two. This may help wash out the bacteria that are causing the infection. (If you have kidney, heart, or liver disease and have to limit fluids, talk with your doctor before you increase your fluid intake.)  · Avoid drinks that are carbonated or have caffeine. They can irritate the bladder. · Urinate often. Try to empty your bladder each time. · To relieve pain, take a hot bath or lay a heating pad set on low over your lower belly or genital area. Never go to sleep with a heating pad in place. To prevent UTIs  · Drink plenty of water each day. This helps you urinate often, which clears bacteria from your system. (If you have kidney, heart, or liver disease and have to limit fluids, talk with your doctor before you increase your fluid intake.)  · Urinate when you need to. · Urinate right after you have sex. · Change sanitary pads often. · Avoid douches, bubble baths, feminine hygiene sprays, and other feminine hygiene products that have deodorants.   · After going to the bathroom, wipe from front to back. When should you call for help? Call your doctor now or seek immediate medical care if:    · Symptoms such as fever, chills, nausea, or vomiting get worse or appear for the first time.     · You have new pain in your back just below your rib cage. This is called flank pain.     · There is new blood or pus in your urine.     · You have any problems with your antibiotic medicine. Watch closely for changes in your health, and be sure to contact your doctor if:    · You are not getting better after taking an antibiotic for 2 days.     · Your symptoms go away but then come back. Where can you learn more? Go to [email or web address] and sign in to your [employer] account. Enter Z323 in the [employer] box to learn more about \"Urinary Tract Infection in Women: Care Instructions. \"     If you do not have an account, please click on the \"Sign Up Now\" link. Current as of: June 29, 2020               Content Version: 12.6  © 2006-2020 [employer]. Care instructions adapted under license by [facility] [place] ([facility]). If you have questions about a medical condition or this instruction, always ask your healthcare professional. [place] [address] any warranty or liability for your use of this information.

## 2020-10-23 NOTE — PROGRESS NOTES
Frank Emery is a 66 y.o. femalewho presents today for her medical conditions/complaints as noted below. Chief Complaint   Patient presents with    Cystitis     Pt states that she feels she has a bladder infection. Burning with urination x 2 days. HPI:     HPI  No fever, or chills  No abdomen pain, no N/V  + dysuria, frequency. No vaginal sx. Had similar sx 2 weeks ago and was put on cephalexin for a week, has been off  It for 5-6 days. Last one was 18 month ago  Current Outpatient Medications   Medication Sig Dispense Refill    levothyroxine (SYNTHROID) 112 MCG tablet TAKE 1 TABLET DAILY 90 tablet 3    potassium chloride (KLOR-CON) 10 MEQ extended release tablet TAKE 2 TABLETS DAILY 180 tablet 3    allopurinol (ZYLOPRIM) 100 MG tablet TAKE 1 TABLET DAILY 90 tablet 3    losartan (COZAAR) 50 MG tablet Take 1 tablet by mouth daily 90 tablet 3    hydrochlorothiazide (MICROZIDE) 12.5 MG capsule Take 1 capsule by mouth every morning 90 capsule 3    meloxicam (MOBIC) 7.5 MG tablet TAKE 1 TABLET BY MOUTH TWO TIMES A DAY AS NEEDED FOR PAIN 60 tablet 3    simvastatin (ZOCOR) 20 MG tablet TAKE 1 TABLET DAILY AT     BEDTIME 90 tablet 3    Calcium-Vitamin D 500-125 MG-UNIT TABS tablet      brimonidine (ALPHAGAN) 0.2 % ophthalmic solution 1 drop every 8 hours      cephALEXin (KEFLEX) 500 MG capsule Take 1 capsule by mouth 3 times daily (Patient not taking: Reported on 10/23/2020) 21 capsule 0     No current facility-administered medications for this visit. Allergies   Allergen Reactions    Ace Inhibitors      Adverse reaction: cough       Subjective:     Review of Systems   Constitutional: Negative. Objective:     /74   Pulse 65   Temp 98.8 °F (37.1 °C)   Wt 242 lb (109.8 kg)   SpO2 98%   BMI 41.80 kg/m²   Physical Exam  Vitals signs and nursing note reviewed. Constitutional:       General: She is not in acute distress. Appearance: Normal appearance.  She is

## 2020-10-25 LAB
CULTURE: ABNORMAL
Lab: ABNORMAL
SPECIMEN DESCRIPTION: ABNORMAL

## 2020-12-16 ENCOUNTER — OFFICE VISIT (OUTPATIENT)
Dept: PRIMARY CARE CLINIC | Age: 75
End: 2020-12-16
Payer: MEDICARE

## 2020-12-16 VITALS
HEART RATE: 72 BPM | TEMPERATURE: 97.6 F | SYSTOLIC BLOOD PRESSURE: 120 MMHG | DIASTOLIC BLOOD PRESSURE: 78 MMHG | HEIGHT: 63 IN | WEIGHT: 241.4 LBS | BODY MASS INDEX: 42.77 KG/M2

## 2020-12-16 PROCEDURE — 99213 OFFICE O/P EST LOW 20 MIN: CPT | Performed by: FAMILY MEDICINE

## 2020-12-16 SDOH — ECONOMIC STABILITY: TRANSPORTATION INSECURITY
IN THE PAST 12 MONTHS, HAS LACK OF TRANSPORTATION KEPT YOU FROM MEETINGS, WORK, OR FROM GETTING THINGS NEEDED FOR DAILY LIVING?: NO

## 2020-12-16 SDOH — ECONOMIC STABILITY: FOOD INSECURITY: WITHIN THE PAST 12 MONTHS, THE FOOD YOU BOUGHT JUST DIDN'T LAST AND YOU DIDN'T HAVE MONEY TO GET MORE.: NEVER TRUE

## 2020-12-16 SDOH — ECONOMIC STABILITY: INCOME INSECURITY: HOW HARD IS IT FOR YOU TO PAY FOR THE VERY BASICS LIKE FOOD, HOUSING, MEDICAL CARE, AND HEATING?: NOT HARD AT ALL

## 2020-12-16 SDOH — ECONOMIC STABILITY: FOOD INSECURITY: WITHIN THE PAST 12 MONTHS, YOU WORRIED THAT YOUR FOOD WOULD RUN OUT BEFORE YOU GOT MONEY TO BUY MORE.: NEVER TRUE

## 2020-12-16 SDOH — ECONOMIC STABILITY: TRANSPORTATION INSECURITY
IN THE PAST 12 MONTHS, HAS THE LACK OF TRANSPORTATION KEPT YOU FROM MEDICAL APPOINTMENTS OR FROM GETTING MEDICATIONS?: NO

## 2020-12-16 ASSESSMENT — ENCOUNTER SYMPTOMS
SHORTNESS OF BREATH: 0
SORE THROAT: 0
NAUSEA: 0
RHINORRHEA: 0
BACK PAIN: 1
EYE REDNESS: 0
VOMITING: 0
EYE DISCHARGE: 0
DIARRHEA: 0
WHEEZING: 0
ABDOMINAL PAIN: 0
COUGH: 0

## 2020-12-16 NOTE — PROGRESS NOTES
[address] [facility] PRIMARY CARE  [address]  [facility] [address]  Dept: [phone] Jigna Mcclellan is a 66 y.o. female who presents today for her medical conditions/complaintsas noted below. Chief Complaint   Patient presents with    Rash     Under breast, upper abdomin rash. Itches and some pain     Medication Check    Hypertension     Patient doesn't check bp at home       HPI:     HPI- pt states pain is more often - doesn't have to do as much to have pain. Meloxicam not helping much. Sitting down helps. No refills needed. Labs done in may reviewed. LDL Cholesterol (mg/dL)   Date Value   09/18/2013 104 (H)   05/15/2012 86     LDL Calculated (mg/dL)   Date Value   05/29/2020 87   06/18/2018 69   02/20/2017 66       (goal LDL is <100)   AST (U/L)   Date Value   05/29/2020 20     ALT (U/L)   Date Value   05/29/2020 26     BUN (mg/dL)   Date Value   05/29/2020 20     BP Readings from Last 3 Encounters:   12/16/20 120/78   10/23/20 122/74   09/09/20 130/78          (goal 120/80)    Past Medical History:   Diagnosis Date    Acute cystitis with hematuria 8/23/2019    Acute idiopathic gout of foot 8/23/2019    Arthritis     Colon polyp     Glaucoma     right eye    Hyperlipidemia     Hypertension     Inflamed skin tag 5/15/2017    Spinal stenosis     Thyroid disease     hypothyroidism      Past Surgical History:   Procedure Laterality Date    BREAST SURGERY Left     open breast biopsy    CHOLECYSTECTOMY      COLONOSCOPY      COLONOSCOPY N/A 4/19/2018    COLONOSCOPY POLYPECTOMY SNARE HOT BIOPSY performed by Tuyet Carlos MD [facility]      TOTAL KNEE ARTHROPLASTY Bilateral        Family History   Problem Relation Age of Onset    Cancer Maternal Grandmother         ?uterine?     Diabetes Mother     COPD Father         ? maybe heart also       Social History     Tobacco Use    Smoking status: Never Smoker    Smokeless tobacco: Never Used   Substance Use Topics    Alcohol use: Not Currently     Alcohol/week: 0.0 standard drinks     Comment: 10 years ago was last glass of wine      Current Outpatient Medications   Medication Sig Dispense Refill    levothyroxine (SYNTHROID) 112 MCG tablet TAKE 1 TABLET DAILY 90 tablet 3    potassium chloride (KLOR-CON) 10 MEQ extended release tablet TAKE 2 TABLETS DAILY 180 tablet 3    allopurinol (ZYLOPRIM) 100 MG tablet TAKE 1 TABLET DAILY 90 tablet 3    losartan (COZAAR) 50 MG tablet Take 1 tablet by mouth daily 90 tablet 3    hydrochlorothiazide (MICROZIDE) 12.5 MG capsule Take 1 capsule by mouth every morning 90 capsule 3    meloxicam (MOBIC) 7.5 MG tablet TAKE 1 TABLET BY MOUTH TWO TIMES A DAY AS NEEDED FOR PAIN 60 tablet 3    simvastatin (ZOCOR) 20 MG tablet TAKE 1 TABLET DAILY AT     BEDTIME 90 tablet 3    Calcium-Vitamin D 500-125 MG-UNIT TABS tablet      brimonidine (ALPHAGAN) 0.2 % ophthalmic solution 1 drop every 8 hours      cephALEXin (KEFLEX) 500 MG capsule Take 1 capsule by mouth 3 times daily (Patient not taking: Reported on 12/16/2020) 21 capsule 0     No current facility-administered medications for this visit. Allergies   Allergen Reactions    Ace Inhibitors      Adverse reaction: cough       Health Maintenance   Topic Date Due    Lipid screen  05/29/2021    TSH testing  05/29/2021    Potassium monitoring  05/29/2021    Creatinine monitoring  05/29/2021    Annual Wellness Visit (AWV)  06/04/2021    DTaP/Tdap/Td vaccine (2 - Td) 07/11/2021    DEXA (modify frequency per FRAX score)  Completed    Flu vaccine  Completed    Shingles Vaccine  Completed    Pneumococcal 65+ years Vaccine  Completed    Hepatitis A vaccine  Aged Out    Hepatitis B vaccine  Aged Out    Hib vaccine  Aged Out    Meningococcal (ACWY) vaccine  Aged Out       Subjective:      Review of Systems   Constitutional: Negative for chills and fever.    HENT: Negative for rhinorrhea and sore throat. Eyes: Negative for discharge and redness. Respiratory: Negative for cough, shortness of breath and wheezing. Cardiovascular: Negative for chest pain and palpitations. Gastrointestinal: Negative for abdominal pain, diarrhea, nausea and vomiting. Genitourinary: Negative for dysuria and frequency. Musculoskeletal: Positive for back pain. Negative for arthralgias and myalgias. Neurological: Positive for light-headedness (if gets up too fast). Negative for dizziness and headaches. Psychiatric/Behavioral: Positive for sleep disturbance. Objective:     /78   Pulse 72   Temp 97.6 °F (36.4 °C)   Ht 5' 3\" (1.6 m)   Wt 241 lb 6.4 oz (109.5 kg)   BMI 42.76 kg/m²   Physical Exam  Vitals signs and nursing note reviewed. Constitutional:       General: She is not in acute distress. Appearance: She is well-developed. She is not ill-appearing. HENT:      Head: Normocephalic and atraumatic. Right Ear: External ear normal.      Left Ear: External ear normal.   Eyes:      General: No scleral icterus. Right eye: No discharge. Left eye: No discharge. Conjunctiva/sclera: Conjunctivae normal.      Pupils: Pupils are equal, round, and reactive to light. Neck:      Thyroid: No thyromegaly. Trachea: No tracheal deviation. Cardiovascular:      Rate and Rhythm: Normal rate and regular rhythm. Heart sounds: Normal heart sounds. Pulmonary:      Effort: Pulmonary effort is normal. No respiratory distress. Breath sounds: Normal breath sounds. No wheezing. Lymphadenopathy:      Cervical: No cervical adenopathy. Skin:     General: Skin is warm. Findings: No rash. Neurological:      Mental Status: She is alert and oriented to person, place, and time. Psychiatric:         Mood and Affect: Mood normal.         Behavior: Behavior normal.         Thought Content:  Thought content normal.         Assessment:       Diagnosis Orders 1. Essential hypertension     2. Chronic pain disorder     3. Lumbar radiculopathy          Plan:      Return in about 3 months (around 3/16/2021) for HTN f/u. No orders of the defined types were placed in this encounter. No orders of the defined types were placed in this encounter. Patient given educationalmaterials - see patient instructions. Discussed use, benefit, and side effectsof prescribed medications. All patient questions answered. Pt voiced understanding. Reviewed health maintenance. Instructed to continue current medications, diet andexercise. Patient agreed with treatment plan. Follow up as directed.      Electronicallysigned by Ronel Aldrich MD on 12/16/2020 at 2:55 PM

## 2021-01-31 DIAGNOSIS — I10 ESSENTIAL HYPERTENSION: Chronic | ICD-10-CM

## 2021-02-01 RX ORDER — LOSARTAN POTASSIUM 50 MG/1
TABLET ORAL
Qty: 90 TABLET | Refills: 3 | Status: SHIPPED | OUTPATIENT
Start: 2021-02-01 | End: 2022-03-14

## 2021-02-01 RX ORDER — HYDROCHLOROTHIAZIDE 12.5 MG/1
CAPSULE, GELATIN COATED ORAL
Qty: 90 CAPSULE | Refills: 3 | Status: SHIPPED | OUTPATIENT
Start: 2021-02-01 | End: 2022-03-14

## 2021-03-29 RX ORDER — SIMVASTATIN 20 MG
TABLET ORAL
Qty: 90 TABLET | Refills: 3 | Status: SHIPPED | OUTPATIENT
Start: 2021-03-29 | End: 2022-04-18

## 2021-04-02 ENCOUNTER — OFFICE VISIT (OUTPATIENT)
Dept: PRIMARY CARE CLINIC | Age: 76
End: 2021-04-02
Payer: MEDICARE

## 2021-04-02 VITALS
DIASTOLIC BLOOD PRESSURE: 68 MMHG | BODY MASS INDEX: 43.62 KG/M2 | WEIGHT: 246.2 LBS | OXYGEN SATURATION: 97 % | HEIGHT: 63 IN | SYSTOLIC BLOOD PRESSURE: 128 MMHG | HEART RATE: 89 BPM | TEMPERATURE: 97.5 F

## 2021-04-02 DIAGNOSIS — M46.1 BILATERAL SACROILIITIS (HCC): ICD-10-CM

## 2021-04-02 DIAGNOSIS — F33.41 RECURRENT MAJOR DEPRESSIVE DISORDER, IN PARTIAL REMISSION (HCC): ICD-10-CM

## 2021-04-02 DIAGNOSIS — M54.16 LUMBAR RADICULOPATHY: Chronic | ICD-10-CM

## 2021-04-02 DIAGNOSIS — I10 ESSENTIAL HYPERTENSION: Primary | Chronic | ICD-10-CM

## 2021-04-02 DIAGNOSIS — E03.9 ACQUIRED HYPOTHYROIDISM: Chronic | ICD-10-CM

## 2021-04-02 DIAGNOSIS — E66.01 CLASS 3 SEVERE OBESITY DUE TO EXCESS CALORIES WITHOUT SERIOUS COMORBIDITY WITH BODY MASS INDEX (BMI) OF 40.0 TO 44.9 IN ADULT (HCC): ICD-10-CM

## 2021-04-02 PROCEDURE — 99213 OFFICE O/P EST LOW 20 MIN: CPT | Performed by: FAMILY MEDICINE

## 2021-04-02 ASSESSMENT — ENCOUNTER SYMPTOMS
NAUSEA: 0
SORE THROAT: 0
EYE REDNESS: 0
VOMITING: 0
COUGH: 0
DIARRHEA: 0
EYE DISCHARGE: 0
ABDOMINAL PAIN: 0
WHEEZING: 0
SHORTNESS OF BREATH: 0
RHINORRHEA: 0

## 2021-04-02 NOTE — PROGRESS NOTES
[address] [facility] PRIMARY CARE  [address]  [facility] [address]  Dept: [phone] Jigna Mcclellan is a 66 y.o. female Established patient, who presents today for her medical conditions/complaints as noted below. Chief Complaint   Patient presents with    Hypertension     Patient doesn't check blood pressure at home    Medication Check       HPI:     HPI- pt c/o a lot of back pain when she does anything. Hurts to walk and then sits down so she sits more than anything. Takes meloxicam once in while. Doesn't work that great. Reviewed prior notes PM - has not been there for a while- no new notes. Reviewed previous Labs    LDL Cholesterol (mg/dL)   Date Value   09/18/2013 104 (H)   05/15/2012 86     LDL Calculated (mg/dL)   Date Value   05/29/2020 87   06/18/2018 69   02/20/2017 66       (goal LDL is <100)   AST (U/L)   Date Value   05/29/2020 20     ALT (U/L)   Date Value   05/29/2020 26     BUN (mg/dL)   Date Value   05/29/2020 20     TSH (uIU/mL)   Date Value   05/29/2020 1.17     BP Readings from Last 3 Encounters:   04/02/21 128/68   12/16/20 120/78   10/23/20 122/74          (goal 120/80)    Past Medical History:   Diagnosis Date    Acute cystitis with hematuria 8/23/2019    Acute idiopathic gout of foot 8/23/2019    Arthritis     Colon polyp     Glaucoma     right eye    Hyperlipidemia     Hypertension     Inflamed skin tag 5/15/2017    Spinal stenosis     Thyroid disease     hypothyroidism      Past Surgical History:   Procedure Laterality Date    BREAST SURGERY Left     open breast biopsy    CHOLECYSTECTOMY      COLONOSCOPY      COLONOSCOPY N/A 4/19/2018    COLONOSCOPY POLYPECTOMY SNARE HOT BIOPSY performed by Lexus Krause MD at [facility]      TOTAL KNEE ARTHROPLASTY Bilateral        Family History   Problem Relation Age of Onset    Cancer Maternal Grandmother         ?uterine?     Diabetes Mother    [facility] Completed    Hepatitis A vaccine  Aged Out    Hepatitis B vaccine  Aged Out    Hib vaccine  Aged Out    Meningococcal (ACWY) vaccine  Aged Out       Subjective:      Review of Systems   Constitutional: Negative for chills and fever. HENT: Negative for rhinorrhea and sore throat. Eyes: Negative for discharge and redness. Respiratory: Negative for cough, shortness of breath and wheezing. Cardiovascular: Negative for chest pain and palpitations. Gastrointestinal: Negative for abdominal pain, diarrhea, nausea and vomiting. Genitourinary: Negative for dysuria and frequency. Musculoskeletal: Negative for arthralgias and myalgias. Neurological: Negative for dizziness, light-headedness and headaches. Psychiatric/Behavioral: Negative for sleep disturbance. Objective:     /68   Pulse 89   Temp 97.5 °F (36.4 °C)   Ht 5' 3\" (1.6 m)   Wt 246 lb 3.2 oz (111.7 kg)   SpO2 97%   BMI 43.61 kg/m²   Physical Exam  Vitals signs and nursing note reviewed. Constitutional:       General: She is not in acute distress. Appearance: She is well-developed. She is not ill-appearing. HENT:      Head: Normocephalic and atraumatic. Right Ear: External ear normal.      Left Ear: External ear normal.   Eyes:      General: No scleral icterus. Right eye: No discharge. Left eye: No discharge. Conjunctiva/sclera: Conjunctivae normal.      Pupils: Pupils are equal, round, and reactive to light. Neck:      Thyroid: No thyromegaly. Trachea: No tracheal deviation. Cardiovascular:      Rate and Rhythm: Normal rate and regular rhythm. Heart sounds: Normal heart sounds. Pulmonary:      Effort: Pulmonary effort is normal. No respiratory distress. Breath sounds: Normal breath sounds. No wheezing. Lymphadenopathy:      Cervical: No cervical adenopathy. Skin:     General: Skin is warm. Findings: No rash.    Neurological:      Mental Status: She is alert and oriented to person, place, and time. Psychiatric:         Mood and Affect: Mood normal.         Behavior: Behavior normal.         Thought Content: Thought content normal.         Assessment:       Diagnosis Orders   1. Essential hypertension  Comprehensive Metabolic Panel    CBC Auto Differential    Magnesium   2. Bilateral sacroiliitis (HCC)  Handicap Placard MISC   3. Recurrent major depressive disorder, in partial remission ([facility])     4. Class 3 severe obesity due to excess calories without serious comorbidity with body mass index (BMI) of 40.0 to 44.9 in adult ([facility]     5. Acquired hypothyroidism  TSH without Reflex   6. Lumbar radiculopathy          Plan:    work on stretches and moving as much as possible. 1. Essential hypertension  -     Comprehensive Metabolic Panel; Future  -     CBC Auto Differential; Future  -     Magnesium; Future  2. Bilateral sacroiliitis [facility])  Assessment & Plan:  Seeing pain managment  Orders:  -     Handicap Placard MISC; Starting Fri 4/2/2021, Disp-1 each, R-0, PrintExp 4-2-25  3. Recurrent major depressive disorder, in partial remission ([facility]  Assessment & Plan:  Stable off meds  4. Class 3 severe obesity due to excess calories without serious comorbidity with body mass index (BMI) of 40.0 to 44.9 in adult [facility])  Assessment & Plan:  Working on weight loss with diet and exerise  5. Acquired hypothyroidism  -     TSH without Reflex; Future  6. Lumbar radiculopathy     Return in about 3 months (around 7/2/2021) for medication f/u, HTN f/u.     Orders Placed This Encounter   Procedures    Comprehensive Metabolic Panel     Standing Status:   Future     Standing Expiration Date:   4/3/2022    CBC Auto Differential     Standing Status:   Future     Standing Expiration Date:   4/3/2022    Magnesium     Standing Status:   Future     Standing Expiration Date:   4/2/2022    TSH without Reflex     Standing Status:   Future     Standing Expiration Date:   4/3/2022     Orders Placed

## 2021-06-23 ENCOUNTER — OFFICE VISIT (OUTPATIENT)
Dept: ORTHOPEDIC SURGERY | Age: 76
End: 2021-06-23
Payer: MEDICARE

## 2021-06-23 DIAGNOSIS — Z96.653 H/O TOTAL KNEE REPLACEMENT, BILATERAL: Primary | ICD-10-CM

## 2021-06-23 PROCEDURE — 99213 OFFICE O/P EST LOW 20 MIN: CPT | Performed by: ORTHOPAEDIC SURGERY

## 2021-06-23 NOTE — PROGRESS NOTES
Brigid Summers M.D.            [address] [facility],[address], [address] [facility]           Dept Phone: [phone]           Dept Fax:  [phone]           [place]          Dept Phone: [phone]           Dept Fax:  [phone]      Chief Compliant:  Chief Complaint   Patient presents with    Pain     h/o bilateral TKA 2005        History of Present Illness: This is a 66 y.o. female who presents to the clinic today for evaluation / follow up of bilateral total knees performed by me back in 2005. She has no complaints regards to the knee whatsoever. She is complaining of some low back symptomatology and she gets a very strong symptoms for spinal stenosis. She had been seeing Dr. Sahra Echevarria in the past and she had bad steroid injections and also was on morphine for a period of time. She is presently not doing this but it does sound like her length of ambulation is very limited as result of most likely represents spinal stenosis. She does have however no complaints regarding her knees. .       Review of Systems   Constitutional: Negative for fever, chills, sweats. Eyes: Negative for changes in vision, or pain. HENT: Negative for ear ache, epistaxis, or sore throat. Respiratory/Cardio: Negative for Chest pain, palpitations, SOB, or cough. Gastrointestinal: Negative for abdominal pain, N/V/D. Genitourinary: Negative for dysuria, frequency, urgency, or hematuria. Neurological: Negative for headache, numbness, or weakness. Integumentary: Negative for rash, itching, laceration, or abrasion. Musculoskeletal: Positive for Pain (h/o bilateral TKA 2005)       Physical Exam:  Constitutional: Patient is oriented to person, place, and time. Patient appears well-developed and well nourished.    HENT: Negative otherwise noted  Head: Normocephalic Probable spinal stenosis probably significant      This is a 66 y.o. female who presents to the clinic today for evaluation / follow up of status post bilateral total knees.      Past History:    Current Outpatient Medications:     Handicap Placard MISC, by Does not apply route Exp 4-2-25, Disp: 1 each, Rfl: 0    simvastatin (ZOCOR) 20 MG tablet, TAKE 1 TABLET DAILY AT BEDTIME, Disp: 90 tablet, Rfl: 3    hydroCHLOROthiazide (MICROZIDE) 12.5 MG capsule, TAKE 1 CAPSULE EVERY MORNING, Disp: 90 capsule, Rfl: 3    losartan (COZAAR) 50 MG tablet, TAKE 1 TABLET DAILY, Disp: 90 tablet, Rfl: 3    levothyroxine (SYNTHROID) 112 MCG tablet, TAKE 1 TABLET DAILY, Disp: 90 tablet, Rfl: 3    cephALEXin (KEFLEX) 500 MG capsule, Take 1 capsule by mouth 3 times daily (Patient not taking: Reported on 4/2/2021), Disp: 21 capsule, Rfl: 0    potassium chloride (KLOR-CON) 10 MEQ extended release tablet, TAKE 2 TABLETS DAILY, Disp: 180 tablet, Rfl: 3    allopurinol (ZYLOPRIM) 100 MG tablet, TAKE 1 TABLET DAILY, Disp: 90 tablet, Rfl: 3    meloxicam (MOBIC) 7.5 MG tablet, TAKE 1 TABLET BY MOUTH TWO TIMES A DAY AS NEEDED FOR PAIN (Patient not taking: Reported on 4/2/2021), Disp: 60 tablet, Rfl: 3    Calcium-Vitamin D 500-125 MG-UNIT TABS, tablet, Disp: , Rfl:     brimonidine (ALPHAGAN) 0.2 % ophthalmic solution, 1 drop every 8 hours, Disp: , Rfl:   Allergies   Allergen Reactions    Ace Inhibitors      Adverse reaction: cough     Social History     Socioeconomic History    Marital status:      Spouse name: Not on file    Number of children: Not on file    Years of education: Not on file    Highest education level: Not on file   Occupational History    Not on file   Tobacco Use    Smoking status: Never Smoker    Smokeless tobacco: Never Used   Vaping Use    Vaping Use: Never used   Substance and Sexual Activity    Alcohol use: Not Currently     Alcohol/week: 0.0 standard drinks     Comment: 10 years ago was last inform the patient that her knees are doing well. In the meantime however sounds like her spinal stenosis is really getting to be debilitating for her. I did suggest that is been over 3 years since she had an MRI and having a new one might be indicated. She was also advised that she may need to see a spinal surgeon I did give her Dr. Liliya Centeno name. I think that if she gets the point where she cannot ambulate or she starts starts having bowel or bladder problems that she certainly should seek his opinion. Otherwise back here in 2 years time for reevaluation of both of her knees    Provider Attestation:  Deangelo Davis, personally performed the services described in this documentation. All medical record entries made by the scribe were at my direction and in my presence. I have reviewed the chart and discharge instructions and agree that the records reflect my personal performance and is accurate and complete. Urbano Mak MD. 06/23/21      Please note that this chart was generated using voice recognition Dragon dictation software. Although every effort was made to ensure the accuracy of this automated transcription, some errors in transcription may have occurred.

## 2021-06-29 LAB
ABSOLUTE BASO #: 0.1 X10E9/L (ref 0–0.2)
ABSOLUTE EOS #: 0.3 X10E9/L (ref 0–0.4)
ABSOLUTE LYMPH #: 2 X10E9/L (ref 1–3.5)
ABSOLUTE MONO #: 0.8 X10E9/L (ref 0–0.9)
ABSOLUTE NEUT #: 3.3 X10E9/L (ref 1.5–6.6)
ALBUMIN SERPL-MCNC: 4.3 G/DL (ref 3.2–5.3)
ALK PHOSPHATASE: 59 U/L (ref 39–130)
ALT SERPL-CCNC: 23 U/L (ref 0–31)
ANION GAP SERPL CALCULATED.3IONS-SCNC: 10 MMOL/L (ref 5–15)
AST SERPL-CCNC: 23 U/L (ref 0–41)
BASOPHILS RELATIVE PERCENT: 0.8 %
BILIRUB SERPL-MCNC: 0.7 MG/DL (ref 0.3–1.2)
BUN BLDV-MCNC: 25 MG/DL (ref 5–27)
CALCIUM SERPL-MCNC: 10.4 MG/DL (ref 8.5–10.5)
CHLORIDE BLD-SCNC: 111 MMOL/L (ref 98–109)
CO2: 24 MMOL/L (ref 22–32)
CREAT SERPL-MCNC: 1.29 MG/DL (ref 0.4–1)
EGFR AFRICAN AMERICAN: 48 ML/MIN/1.73SQ.M
EGFR IF NONAFRICAN AMERICAN: 40 ML/MIN/1.73SQ.M
EOSINOPHILS RELATIVE PERCENT: 4.9 %
GLUCOSE: 93 MG/DL (ref 65–99)
HCT VFR BLD CALC: 42.5 % (ref 35–47)
HEMOGLOBIN: 14.2 G/DL (ref 11.7–15.5)
LYMPHOCYTE %: 31.3 %
MAGNESIUM: 1.8 MG/DL (ref 1.8–2.6)
MCH RBC QN AUTO: 30.3 PG (ref 27–34)
MCHC RBC AUTO-ENTMCNC: 33.4 G/DL (ref 32–36)
MCV RBC AUTO: 91 FL (ref 80–100)
MONOCYTES # BLD: 12.3 %
NEUTROPHILS RELATIVE PERCENT: 50.7 %
PDW BLD-RTO: 14.4 % (ref 11.5–15)
PLATELETS: 194 X10E9/L (ref 150–450)
PMV BLD AUTO: 11.3 FL (ref 7–12)
POTASSIUM SERPL-SCNC: 4.3 MMOL/L (ref 3.5–5)
RBC: 4.69 X10E12/L (ref 3.8–5.2)
SODIUM BLD-SCNC: 145 MMOL/L (ref 134–146)
TOTAL PROTEIN: 7 G/DL (ref 6–8)
TSH SERPL DL<=0.05 MIU/L-ACNC: 0.68 UIU/ML (ref 0.49–4.67)
VITAMIN D 25-HYDROXY: 38.6 NG/ML (ref 30–100)
WBC: 6.5 X10E9/L (ref 4–11)

## 2021-07-02 ENCOUNTER — OFFICE VISIT (OUTPATIENT)
Dept: PRIMARY CARE CLINIC | Age: 76
End: 2021-07-02
Payer: MEDICARE

## 2021-07-02 VITALS
OXYGEN SATURATION: 95 % | WEIGHT: 242.6 LBS | HEART RATE: 85 BPM | BODY MASS INDEX: 42.97 KG/M2 | DIASTOLIC BLOOD PRESSURE: 80 MMHG | SYSTOLIC BLOOD PRESSURE: 130 MMHG

## 2021-07-02 DIAGNOSIS — I10 ESSENTIAL HYPERTENSION: Chronic | ICD-10-CM

## 2021-07-02 DIAGNOSIS — M54.16 LUMBAR RADICULOPATHY: Chronic | ICD-10-CM

## 2021-07-02 DIAGNOSIS — M89.9 DISORDER OF BONE, UNSPECIFIED: ICD-10-CM

## 2021-07-02 DIAGNOSIS — N28.9 RENAL INSUFFICIENCY: Chronic | ICD-10-CM

## 2021-07-02 PROCEDURE — 99213 OFFICE O/P EST LOW 20 MIN: CPT | Performed by: FAMILY MEDICINE

## 2021-07-02 ASSESSMENT — ENCOUNTER SYMPTOMS
NAUSEA: 0
EYE REDNESS: 0
COUGH: 0
ABDOMINAL PAIN: 0
SORE THROAT: 0
SHORTNESS OF BREATH: 1
EYE DISCHARGE: 0
VOMITING: 0
RHINORRHEA: 0
WHEEZING: 0
DIARRHEA: 0

## 2021-07-02 NOTE — PROGRESS NOTES
Creatinine monitoring  06/28/2022    DEXA (modify frequency per FRAX score)  Completed    Shingles Vaccine  Completed    Pneumococcal 65+ years Vaccine  Completed    COVID-19 Vaccine  Completed    Hepatitis A vaccine  Aged Out    Hepatitis B vaccine  Aged Out    Hib vaccine  Aged Out    Meningococcal (ACWY) vaccine  Aged Out       Subjective:      Review of Systems   Constitutional: Negative for chills and fever. HENT: Negative for rhinorrhea and sore throat. Eyes: Negative for discharge and redness. Respiratory: Positive for shortness of breath (no stamina). Negative for cough and wheezing. Stridor: Baseline. Cardiovascular: Negative for chest pain and palpitations. Gastrointestinal: Negative for abdominal pain, diarrhea, nausea and vomiting. Genitourinary: Negative for dysuria and frequency. Musculoskeletal: Negative for arthralgias and myalgias. Neurological: Positive for dizziness (Occas). Negative for light-headedness and headaches. Psychiatric/Behavioral: Negative for sleep disturbance. Objective:     /80   Pulse 85   Wt 242 lb 9.6 oz (110 kg)   SpO2 95%   BMI 42.97 kg/m²   Physical Exam  Vitals and nursing note reviewed. Constitutional:       General: She is not in acute distress. Appearance: She is well-developed. She is not ill-appearing. HENT:      Head: Normocephalic and atraumatic. Right Ear: External ear normal.      Left Ear: External ear normal.   Eyes:      General: No scleral icterus. Right eye: No discharge. Left eye: No discharge. Conjunctiva/sclera: Conjunctivae normal.      Pupils: Pupils are equal, round, and reactive to light. Neck:      Thyroid: No thyromegaly. Trachea: No tracheal deviation. Cardiovascular:      Rate and Rhythm: Normal rate and regular rhythm. Heart sounds: Normal heart sounds. Pulmonary:      Effort: Pulmonary effort is normal. No respiratory distress.       Breath sounds: Normal

## 2021-07-16 ENCOUNTER — TELEPHONE (OUTPATIENT)
Dept: ORTHOPEDIC SURGERY | Age: 76
End: 2021-07-16

## 2021-07-16 NOTE — TELEPHONE ENCOUNTER
Patient is asking if she needs to continue taking an antibiotic when she has dental work. H/O knee surgery. Please contact her at earliest convenience to discuss. Thank you.

## 2021-07-19 RX ORDER — ALLOPURINOL 100 MG/1
TABLET ORAL
Qty: 90 TABLET | Refills: 3 | Status: SHIPPED | OUTPATIENT
Start: 2021-07-19 | End: 2022-09-12

## 2021-09-20 RX ORDER — POTASSIUM CHLORIDE 750 MG/1
TABLET, FILM COATED, EXTENDED RELEASE ORAL
Qty: 180 TABLET | Refills: 3 | Status: SHIPPED | OUTPATIENT
Start: 2021-09-20 | End: 2022-09-19

## 2021-10-06 ENCOUNTER — OFFICE VISIT (OUTPATIENT)
Dept: PRIMARY CARE CLINIC | Age: 76
End: 2021-10-06
Payer: MEDICARE

## 2021-10-06 VITALS
HEART RATE: 68 BPM | SYSTOLIC BLOOD PRESSURE: 110 MMHG | WEIGHT: 245.8 LBS | BODY MASS INDEX: 43.54 KG/M2 | OXYGEN SATURATION: 97 % | DIASTOLIC BLOOD PRESSURE: 70 MMHG

## 2021-10-06 DIAGNOSIS — M54.16 LUMBAR RADICULOPATHY: Chronic | ICD-10-CM

## 2021-10-06 DIAGNOSIS — Z12.11 SCREENING FOR COLON CANCER: ICD-10-CM

## 2021-10-06 DIAGNOSIS — K63.5 POLYP OF SIGMOID COLON, UNSPECIFIED TYPE: Chronic | ICD-10-CM

## 2021-10-06 DIAGNOSIS — I10 PRIMARY HYPERTENSION: Primary | Chronic | ICD-10-CM

## 2021-10-06 PROCEDURE — G0008 ADMIN INFLUENZA VIRUS VAC: HCPCS | Performed by: FAMILY MEDICINE

## 2021-10-06 PROCEDURE — 99213 OFFICE O/P EST LOW 20 MIN: CPT | Performed by: FAMILY MEDICINE

## 2021-10-06 PROCEDURE — 90694 VACC AIIV4 NO PRSRV 0.5ML IM: CPT | Performed by: FAMILY MEDICINE

## 2021-10-06 ASSESSMENT — ENCOUNTER SYMPTOMS
DIARRHEA: 0
BACK PAIN: 1
RHINORRHEA: 0
ABDOMINAL PAIN: 0
NAUSEA: 0
SHORTNESS OF BREATH: 0
WHEEZING: 0
COUGH: 0
SORE THROAT: 0
EYE REDNESS: 0
VOMITING: 0
EYE DISCHARGE: 0

## 2021-10-06 NOTE — PROGRESS NOTES
[address] [facility] PRIMARY CARE  [address] [address] [facility] [address]  Dept: 0614 Jigna Mcclellan is a 66 y.o. female Established patient, who presents today for her medical conditions/complaints as noted below. Chief Complaint   Patient presents with    Hypertension    Medication Check       HPI:     HPI- pt states if she doesn't do anything then she feels fine, but when she tries to do anything her back hurts. Knows she needs to eat less and exercise more, but doesn't do as much as she should. Trying to work on it more. Reviewed prior notes None  Reviewed previous Labs    LDL Cholesterol (mg/dL)   Date Value   09/18/2013 104 (H)   05/15/2012 86     LDL Calculated (mg/dL)   Date Value   05/29/2020 87   06/18/2018 69   02/20/2017 66       (goal LDL is <100)   AST (U/L)   Date Value   06/28/2021 23     ALT (U/L)   Date Value   06/28/2021 23     BUN (mg/dL)   Date Value   06/28/2021 25     TSH (uIU/mL)   Date Value   06/28/2021 0.68     BP Readings from Last 3 Encounters:   10/06/21 110/70   07/02/21 130/80   04/02/21 128/68          (goal 120/80)    Past Medical History:   Diagnosis Date    Acute cystitis with hematuria 8/23/2019    Acute idiopathic gout of foot 8/23/2019    Arthritis     Colon polyp     Glaucoma     right eye    Hyperlipidemia     Hypertension     Inflamed skin tag 5/15/2017    Spinal stenosis     Thyroid disease     hypothyroidism      Past Surgical History:   Procedure Laterality Date    BREAST SURGERY Left     open breast biopsy    CHOLECYSTECTOMY      COLONOSCOPY      COLONOSCOPY N/A 4/19/2018    COLONOSCOPY POLYPECTOMY SNARE HOT BIOPSY performed by Karen Griffin MD at [facility]      TOTAL KNEE ARTHROPLASTY Bilateral        Family History   Problem Relation Age of Onset    Cancer Maternal Grandmother         ?uterine?     Diabetes Mother     COPD Father         ? maybe heart also Social History     Tobacco Use    Smoking status: Never Smoker    Smokeless tobacco: Never Used   Substance Use Topics    Alcohol use: Not Currently     Alcohol/week: 0.0 standard drinks     Comment: 10 years ago was last glass of wine      Current Outpatient Medications   Medication Sig Dispense Refill    potassium chloride (KLOR-CON) 10 MEQ extended release tablet TAKE 2 TABLETS DAILY 180 tablet 3    allopurinol (ZYLOPRIM) 100 MG tablet TAKE 1 TABLET DAILY 90 tablet 3    Handicap Placard MISC by Does not apply route Exp 4-2-25 1 each 0    simvastatin (ZOCOR) 20 MG tablet TAKE 1 TABLET DAILY AT BEDTIME 90 tablet 3    hydroCHLOROthiazide (MICROZIDE) 12.5 MG capsule TAKE 1 CAPSULE EVERY MORNING 90 capsule 3    losartan (COZAAR) 50 MG tablet TAKE 1 TABLET DAILY 90 tablet 3    levothyroxine (SYNTHROID) 112 MCG tablet TAKE 1 TABLET DAILY 90 tablet 3    meloxicam (MOBIC) 7.5 MG tablet TAKE 1 TABLET BY MOUTH TWO TIMES A DAY AS NEEDED FOR PAIN 60 tablet 3    Calcium-Vitamin D 500-125 MG-UNIT TABS tablet      brimonidine (ALPHAGAN) 0.2 % ophthalmic solution 1 drop every 8 hours      cephALEXin (KEFLEX) 500 MG capsule Take 1 capsule by mouth 3 times daily (Patient not taking: Reported on 10/6/2021) 21 capsule 0     No current facility-administered medications for this visit.      Allergies   Allergen Reactions    Ace Inhibitors      Adverse reaction: cough       Health Maintenance   Topic Date Due    Hepatitis C screen  Never done    Lipid screen  05/29/2021    Annual Wellness Visit (AWV)  06/04/2021    DTaP/Tdap/Td vaccine (2 - Td or Tdap) 07/11/2021    TSH testing  06/28/2022    Potassium monitoring  06/28/2022    Creatinine monitoring  06/28/2022    DEXA (modify frequency per FRAX score)  Completed    Flu vaccine  Completed    Shingles Vaccine  Completed    Pneumococcal 65+ years Vaccine  Completed    COVID-19 Vaccine  Completed    Hepatitis A vaccine  Aged Out    Hepatitis B vaccine Aged Out    Hib vaccine  Aged Out    Meningococcal (ACWY) vaccine  Aged Out       Subjective:      Review of Systems   Constitutional: Negative for chills and fever. HENT: Negative for rhinorrhea and sore throat. Eyes: Negative for discharge and redness. Respiratory: Negative for cough, shortness of breath and wheezing. Cardiovascular: Negative for chest pain and palpitations. Gastrointestinal: Negative for abdominal pain, diarrhea, nausea and vomiting. Genitourinary: Negative for dysuria and frequency. Musculoskeletal: Positive for back pain. Negative for arthralgias and myalgias. Neurological: Negative for dizziness, light-headedness and headaches. Psychiatric/Behavioral: Positive for sleep disturbance (sometimes). Objective:     /70   Pulse 68   Wt 245 lb 12.8 oz (111.5 kg)   SpO2 97%   BMI 43.54 kg/m²   Physical Exam  Vitals and nursing note reviewed. Constitutional:       General: She is not in acute distress. Appearance: She is well-developed. She is not ill-appearing. HENT:      Head: Normocephalic and atraumatic. Right Ear: External ear normal.      Left Ear: External ear normal.   Eyes:      General: No scleral icterus. Right eye: No discharge. Left eye: No discharge. Conjunctiva/sclera: Conjunctivae normal.      Pupils: Pupils are equal, round, and reactive to light. Neck:      Thyroid: No thyromegaly. Trachea: No tracheal deviation. Cardiovascular:      Rate and Rhythm: Normal rate and regular rhythm. Heart sounds: Normal heart sounds. Pulmonary:      Effort: Pulmonary effort is normal. No respiratory distress. Breath sounds: Normal breath sounds. No wheezing. Lymphadenopathy:      Cervical: No cervical adenopathy. Skin:     General: Skin is warm. Findings: No rash. Neurological:      Mental Status: She is alert and oriented to person, place, and time.    Psychiatric:         Mood and Affect: Mood normal. Behavior: Behavior normal.         Thought Content: Thought content normal.         Assessment/Plan:   1. Primary hypertension  Assessment & Plan:   Well-controlled, continue current medications  2. Screening for colon cancer  -     External Referral To General Surgery  3. Polyp of sigmoid colon, unspecified type  Assessment & Plan:   due for repeat scope  4. Lumbar radiculopathy  Assessment & Plan:   continue same       Return in about 3 months (around 1/6/2022) for well visit. Orders Placed This Encounter   Procedures    INFLUENZA, QUADV, ADJUVANTED, 72 YRS =, IM, PF, PREFILL SYR, 0.5ML (FLUAD)    External Referral To General Surgery     Referral Priority:   Routine     Referral Type:   Eval and Treat     Referral Reason:   Specialty Services Required     Referred to Provider:   Jennifer Mehta MD     Requested Specialty:   General Surgery     Number of Visits Requested:   1     No orders of the defined types were placed in this encounter. Patient given educational materials - see patient instructions. Discussed use, benefit, and side effects of prescribed medications. All patient questions answered. Pt voiced understanding. Reviewed health maintenance. Instructed to continue current medications, diet and exercise. Patient agreed with treatment plan. Follow up as directed.      Electronically signed by Reji Adhikari MD on 10/6/2021 at 4:26 PM

## 2021-10-13 ENCOUNTER — NURSE TRIAGE (OUTPATIENT)
Dept: OTHER | Facility: CLINIC | Age: 76
End: 2021-10-13

## 2021-10-13 ENCOUNTER — OFFICE VISIT (OUTPATIENT)
Dept: PRIMARY CARE CLINIC | Age: 76
End: 2021-10-13
Payer: MEDICARE

## 2021-10-13 VITALS
SYSTOLIC BLOOD PRESSURE: 118 MMHG | HEART RATE: 71 BPM | WEIGHT: 246.2 LBS | HEIGHT: 63 IN | DIASTOLIC BLOOD PRESSURE: 68 MMHG | BODY MASS INDEX: 43.62 KG/M2 | OXYGEN SATURATION: 97 %

## 2021-10-13 DIAGNOSIS — M10.9 ACUTE GOUT, UNSPECIFIED CAUSE, UNSPECIFIED SITE: Primary | ICD-10-CM

## 2021-10-13 PROCEDURE — 99214 OFFICE O/P EST MOD 30 MIN: CPT | Performed by: PHYSICIAN ASSISTANT

## 2021-10-13 RX ORDER — PREDNISONE 20 MG/1
20 TABLET ORAL 2 TIMES DAILY
Qty: 10 TABLET | Refills: 0 | Status: SHIPPED | OUTPATIENT
Start: 2021-10-13 | End: 2021-10-18

## 2021-10-13 ASSESSMENT — ENCOUNTER SYMPTOMS
CHEST TIGHTNESS: 0
CONSTIPATION: 0
ABDOMINAL PAIN: 0
PHOTOPHOBIA: 0
SORE THROAT: 0
SHORTNESS OF BREATH: 0
VOMITING: 0
ABDOMINAL DISTENTION: 0
RHINORRHEA: 0
DIARRHEA: 0
SINUS PRESSURE: 0
EYE DISCHARGE: 0
COUGH: 0

## 2021-10-13 NOTE — TELEPHONE ENCOUNTER
Received call from [address] at [facility] with [employer]. Brief description of triage:   Left foot redness and swelling, around a bunion on her foot    Triage indicates for patient to be seen today in the office, patient encouraged to go the [facility] if no available appointments     Care advice provided, patient verbalizes understanding; denies any other questions or concerns; instructed to call back for any new or worsening symptoms. Writer provided warm transfer to David Wyatt at [facility] for appointment scheduling. Attention Provider: Thank you for allowing me to participate in the care of your patient. The patient was connected to triage in response to information provided to the ECC/PSC. Please do not respond through this encounter as the response is not directed to a shared pool. Reason for Disposition   Patient wants to be seen    Answer Assessment - Initial Assessment Questions  1. ONSET: \"When did the swelling start? \" (e.g., minutes, hours, days)      A few days ago    2. LOCATION: \"What part of the leg is swollen? \"  \"Are both legs swollen or just one leg? \"      See above note    3. SEVERITY: \"How bad is the swelling? \" (e.g., localized; mild, moderate, severe)   - Localized - small area of swelling localized to one leg   - MILD pedal edema - swelling limited to foot and ankle, pitting edema < 1/4 inch (6 mm) deep, rest and elevation eliminate most or all swelling   - MODERATE edema - swelling of lower leg to knee, pitting edema > 1/4 inch (6 mm) deep, rest and elevation only partially reduce swelling   - SEVERE edema - swelling extends above knee, facial or hand swelling present      Mild    4. REDNESS: \"Does the swelling look red or infected? \"      Red    5. PAIN: \"Is the swelling painful to touch? \" If so, ask: \"How painful is it? \"   (Scale 1-10; mild, moderate or severe)      Yes, a 2 without shoes    6. FEVER: \"Do you have a fever? \" If so, ask: \"What is it, how was it measured, and when did it start? \"       No    7. CAUSE: \"What do you think is causing the leg swelling? \"      Gout, states that she has been eating too many sweets    8. MEDICAL HISTORY: \"Do you have a history of heart failure, kidney disease, liver failure, or cancer? \"      No    9. RECURRENT SYMPTOM: \"Have you had leg swelling before? \" If so, ask: \"When was the last time? \" \"What happened that time? \"      Yes, when her gout was diagnosed 3 years ago, started a medication that has been helping until now    10. OTHER SYMPTOMS: \"Do you have any other symptoms? \" (e.g., chest pain, difficulty breathing)        No    11. PREGNANCY: \"Is there any chance you are pregnant? \" \"When was your last menstrual period? \"        n/a    Protocols used: LEG SWELLING AND EDEMA-ADULT-OH

## 2021-10-13 NOTE — PROGRESS NOTES
[facility] PRIMARY CARE  [address] [facility] [address]  Dept: 564 Jigna Mcclellan is a 66 y.o. female Established patient, who presents today for her medical conditions/complaints as noted below. Chief Complaint   Patient presents with    Gout     Left foot        HPI:     HPI Left foot with pain and swelling at MTP. Has history of gout taking allopurinol. Not watching diet. The patient is not diabetic. Did have elevated creatine in the past.     Reviewed prior notes None  Reviewed previous Labs    LDL Cholesterol (mg/dL)   Date Value   09/18/2013 104 (H)   05/15/2012 86     LDL Calculated (mg/dL)   Date Value   05/29/2020 87   06/18/2018 69   02/20/2017 66       (goal LDL is <100)   AST (U/L)   Date Value   06/28/2021 23     ALT (U/L)   Date Value   06/28/2021 23     BUN (mg/dL)   Date Value   06/28/2021 25     TSH (uIU/mL)   Date Value   06/28/2021 0.68     BP Readings from Last 3 Encounters:   10/13/21 118/68   10/06/21 110/70   07/02/21 130/80          (goal 120/80)    Past Medical History:   Diagnosis Date    Acute cystitis with hematuria 8/23/2019    Acute idiopathic gout of foot 8/23/2019    Arthritis     Colon polyp     Glaucoma     right eye    Hyperlipidemia     Hypertension     Inflamed skin tag 5/15/2017    Spinal stenosis     Thyroid disease     hypothyroidism      Past Surgical History:   Procedure Laterality Date    BREAST SURGERY Left     open breast biopsy    CHOLECYSTECTOMY      COLONOSCOPY      COLONOSCOPY N/A 4/19/2018    COLONOSCOPY POLYPECTOMY SNARE HOT BIOPSY performed by Tess Broderick MD at [facility]      TOTAL KNEE ARTHROPLASTY Bilateral        Family History   Problem Relation Age of Onset    Cancer Maternal Grandmother         ?uterine?     Diabetes Mother     COPD Father         ? maybe heart also       Social History     Tobacco Use    Smoking status: Never Smoker    Smokeless tobacco: Never Used   Substance Use Topics    Alcohol use: Not Currently     Alcohol/week: 0.0 standard drinks     Comment: 10 years ago was last glass of wine      Current Outpatient Medications   Medication Sig Dispense Refill    predniSONE (DELTASONE) 20 MG tablet Take 1 tablet by mouth 2 times daily for 5 days 10 tablet 0    potassium chloride (KLOR-CON) 10 MEQ extended release tablet TAKE 2 TABLETS DAILY 180 tablet 3    allopurinol (ZYLOPRIM) 100 MG tablet TAKE 1 TABLET DAILY 90 tablet 3    Handicap Placard MISC by Does not apply route Exp 4-2-25 1 each 0    simvastatin (ZOCOR) 20 MG tablet TAKE 1 TABLET DAILY AT BEDTIME 90 tablet 3    hydroCHLOROthiazide (MICROZIDE) 12.5 MG capsule TAKE 1 CAPSULE EVERY MORNING 90 capsule 3    losartan (COZAAR) 50 MG tablet TAKE 1 TABLET DAILY 90 tablet 3    levothyroxine (SYNTHROID) 112 MCG tablet TAKE 1 TABLET DAILY 90 tablet 3    meloxicam (MOBIC) 7.5 MG tablet TAKE 1 TABLET BY MOUTH TWO TIMES A DAY AS NEEDED FOR PAIN 60 tablet 3    Calcium-Vitamin D 500-125 MG-UNIT TABS tablet      brimonidine (ALPHAGAN) 0.2 % ophthalmic solution 1 drop every 8 hours       No current facility-administered medications for this visit.      Allergies   Allergen Reactions    Ace Inhibitors      Adverse reaction: cough       Health Maintenance   Topic Date Due    Hepatitis C screen  Never done    Lipid screen  05/29/2021    Annual Wellness Visit (AWV)  06/04/2021    DTaP/Tdap/Td vaccine (2 - Td or Tdap) 07/11/2021    TSH testing  06/28/2022    Potassium monitoring  06/28/2022    Creatinine monitoring  06/28/2022    DEXA (modify frequency per FRAX score)  Completed    Flu vaccine  Completed    Shingles Vaccine  Completed    Pneumococcal 65+ years Vaccine  Completed    COVID-19 Vaccine  Completed    Hepatitis A vaccine  Aged Out    Hepatitis B vaccine  Aged Out    Hib vaccine  Aged Out    Meningococcal (ACWY) vaccine  Aged Out       Subjective:      Review of Systems   Constitutional: Negative for chills, fever and unexpected weight change. HENT: Negative for congestion, hearing loss, rhinorrhea, sinus pressure and sore throat. Eyes: Negative for photophobia, discharge and visual disturbance. Respiratory: Negative for cough, chest tightness and shortness of breath. Cardiovascular: Negative for chest pain, palpitations and leg swelling. Gastrointestinal: Negative for abdominal distention, abdominal pain, constipation, diarrhea and vomiting. Endocrine: Negative for polydipsia and polyuria. Genitourinary: Negative for decreased urine volume, difficulty urinating, frequency and urgency. Musculoskeletal: Negative for arthralgias, gait problem and myalgias. Skin: Negative for rash. Allergic/Immunologic: Negative for food allergies. Neurological: Negative for dizziness, weakness, numbness and headaches. Hematological: Negative for adenopathy. Psychiatric/Behavioral: Negative for dysphoric mood and sleep disturbance. The patient is not nervous/anxious. Objective:     /68   Pulse 71   Ht 5' 3\" (1.6 m)   Wt 246 lb 3.2 oz (111.7 kg)   SpO2 97%   BMI 43.61 kg/m²   Physical Exam  Constitutional:       General: She is not in acute distress. Appearance: Normal appearance. She is not ill-appearing. HENT:      Head: Normocephalic and atraumatic. Right Ear: External ear normal.      Left Ear: External ear normal.      Nose: Nose normal.      Mouth/Throat:      Mouth: Mucous membranes are moist.   Eyes:      Extraocular Movements: Extraocular movements intact. Conjunctiva/sclera: Conjunctivae normal.      Pupils: Pupils are equal, round, and reactive to light. Neck:      Vascular: No carotid bruit. Cardiovascular:      Rate and Rhythm: Normal rate and regular rhythm. Pulses: Normal pulses. Heart sounds: Normal heart sounds. Pulmonary:      Effort: Pulmonary effort is normal. No respiratory distress.       Breath sounds: Normal breath sounds. Abdominal:      General: Bowel sounds are normal. There is no distension. Tenderness: There is no abdominal tenderness. Musculoskeletal:         General: Normal range of motion. Cervical back: Normal range of motion and neck supple. Lymphadenopathy:      Cervical: No cervical adenopathy. Skin:     General: Skin is warm and dry. Neurological:      General: No focal deficit present. Mental Status: She is alert and oriented to person, place, and time. Psychiatric:         Mood and Affect: Mood normal.         Behavior: Behavior normal.         Thought Content: Thought content normal.         Assessment and Plan:          1. Acute gout, unspecified cause, unspecified site  -     predniSONE (DELTASONE) 20 MG tablet; Take 1 tablet by mouth 2 times daily for 5 days, Disp-10 tablet, R-0Normal   Avoid nsaids due to kidney function. Patient given educational materials - see patient instructions. Discussed use, benefit, and side effects of prescribed medications. All patient questions answered. Pt voiced understanding. Reviewed health maintenance. Instructed to continue current medications, diet and exercise. Patient agreed with treatment plan. Follow up as directed.      Electronically signed by GABRIEL Foreman on 10/13/2021 at 2:47 PM

## 2021-10-25 RX ORDER — LEVOTHYROXINE SODIUM 112 UG/1
TABLET ORAL
Qty: 90 TABLET | Refills: 3 | Status: SHIPPED | OUTPATIENT
Start: 2021-10-25 | End: 2022-10-31

## 2022-01-19 ENCOUNTER — OFFICE VISIT (OUTPATIENT)
Dept: PRIMARY CARE CLINIC | Age: 77
End: 2022-01-19
Payer: MEDICARE

## 2022-01-19 VITALS
OXYGEN SATURATION: 91 % | HEART RATE: 52 BPM | BODY MASS INDEX: 40.33 KG/M2 | SYSTOLIC BLOOD PRESSURE: 120 MMHG | DIASTOLIC BLOOD PRESSURE: 68 MMHG | HEIGHT: 63 IN | WEIGHT: 227.6 LBS

## 2022-01-19 DIAGNOSIS — F33.41 RECURRENT MAJOR DEPRESSIVE DISORDER, IN PARTIAL REMISSION (HCC): ICD-10-CM

## 2022-01-19 DIAGNOSIS — M46.1 BILATERAL SACROILIITIS (HCC): ICD-10-CM

## 2022-01-19 DIAGNOSIS — Z00.00 ROUTINE GENERAL MEDICAL EXAMINATION AT A HEALTH CARE FACILITY: ICD-10-CM

## 2022-01-19 DIAGNOSIS — Z00.00 MEDICARE ANNUAL WELLNESS VISIT, SUBSEQUENT: ICD-10-CM

## 2022-01-19 DIAGNOSIS — Z23 NEED FOR VACCINATION: Primary | ICD-10-CM

## 2022-01-19 PROCEDURE — G0439 PPPS, SUBSEQ VISIT: HCPCS | Performed by: FAMILY MEDICINE

## 2022-01-19 SDOH — ECONOMIC STABILITY: FOOD INSECURITY: WITHIN THE PAST 12 MONTHS, YOU WORRIED THAT YOUR FOOD WOULD RUN OUT BEFORE YOU GOT MONEY TO BUY MORE.: NEVER TRUE

## 2022-01-19 SDOH — ECONOMIC STABILITY: FOOD INSECURITY: WITHIN THE PAST 12 MONTHS, THE FOOD YOU BOUGHT JUST DIDN'T LAST AND YOU DIDN'T HAVE MONEY TO GET MORE.: NEVER TRUE

## 2022-01-19 ASSESSMENT — PATIENT HEALTH QUESTIONNAIRE - PHQ9
2. FEELING DOWN, DEPRESSED OR HOPELESS: 0
SUM OF ALL RESPONSES TO PHQ QUESTIONS 1-9: 0
1. LITTLE INTEREST OR PLEASURE IN DOING THINGS: 0
SUM OF ALL RESPONSES TO PHQ9 QUESTIONS 1 & 2: 0
SUM OF ALL RESPONSES TO PHQ QUESTIONS 1-9: 0

## 2022-01-19 ASSESSMENT — SOCIAL DETERMINANTS OF HEALTH (SDOH): HOW HARD IS IT FOR YOU TO PAY FOR THE VERY BASICS LIKE FOOD, HOUSING, MEDICAL CARE, AND HEATING?: NOT HARD AT ALL

## 2022-01-19 NOTE — PATIENT INSTRUCTIONS
Personalized Preventive Plan for Panchito Hernandez - 1/19/2022  Medicare offers a range of preventive health benefits. Some of the tests and screenings are paid in full while other may be subject to a deductible, co-insurance, and/or copay. Some of these benefits include a comprehensive review of your medical history including lifestyle, illnesses that may run in your family, and various assessments and screenings as appropriate. After reviewing your medical record and screening and assessments performed today your provider may have ordered immunizations, labs, imaging, and/or referrals for you. A list of these orders (if applicable) as well as your Preventive Care list are included within your After Visit Summary for your review. Other Preventive Recommendations:    · A preventive eye exam performed by an eye specialist is recommended every 1-2 years to screen for glaucoma; cataracts, macular degeneration, and other eye disorders. · A preventive dental visit is recommended every 6 months. · Try to get at least 150 minutes of exercise per week or 10,000 steps per day on a pedometer . · Order or download the FREE \"Exercise & Physical Activity: Your Everyday Guide\" from [employer]. Call 1-151.654.5399 or search [employer] online. · You need 8732-3847 mg of calcium and 6853-3642 IU of vitamin D per day. It is possible to meet your calcium requirement with diet alone, but a vitamin D supplement is usually necessary to meet this goal.  · When exposed to the sun, use a sunscreen that protects against both UVA and UVB radiation with an SPF of 30 or greater. Reapply every 2 to 3 hours or after sweating, drying off with a towel, or swimming. · Always wear a seat belt when traveling in a car. Always wear a helmet when riding a bicycle or motorcycle.

## 2022-01-19 NOTE — PROGRESS NOTES
Medicare Annual Wellness Visit  Name: Harman Medina Date: 2022   MRN: [id number] Sex: Female   Age: 64 y.o. Ethnicity: Non- / Non    : 1945 Race: White (non-)      Gage Sosa is here for Medicare AWV (Patient was given the words respect elephant and green to remember. She was given the time of 1:45 to draw. )    Screenings for behavioral, psychosocial and functional/safety risks, and cognitive dysfunction are all negative except as indicated below. These results, as well as other patient data from the [address] form, are documented in Flowsheets linked to this Encounter. Allergies   Allergen Reactions    Ace Inhibitors      Adverse reaction: cough         Prior to Visit Medications    Medication Sig Taking?  Authorizing Provider   levothyroxine (SYNTHROID) 112 MCG tablet TAKE 1 TABLET DAILY Yes Jennifer Singh MD   potassium chloride (KLOR-CON) 10 MEQ extended release tablet TAKE 2 TABLETS DAILY Yes Jennifer Singh MD   allopurinol (ZYLOPRIM) 100 MG tablet TAKE 1 TABLET DAILY Yes Jennifer Singh MD   Handicap [facility] [address] [facility] by Does not apply route Exp 25 Yes Jennifer Singh MD   simvastatin (ZOCOR) 20 MG tablet TAKE 1 TABLET DAILY AT BEDTIME Yes Jennifer Singh MD   hydroCHLOROthiazide (MICROZIDE) 12.5 MG capsule TAKE 1 CAPSULE EVERY MORNING Yes Jennifer Singh MD   losartan (COZAAR) 50 MG tablet TAKE 1 TABLET DAILY Yes Jennifer Singh MD   meloxicam (MOBIC) 7.5 MG tablet TAKE 1 TABLET BY MOUTH TWO TIMES A DAY AS NEEDED FOR PAIN Yes Jennifer Singh MD   Calcium-Vitamin D 500-125 MG-UNIT TABS tablet Yes Historical Provider, MD   brimonidine (ALPHAGAN) 0.2 % ophthalmic solution 1 drop every 8 hours Yes Historical Provider, MD         Past Medical History:   Diagnosis Date    Acute cystitis with hematuria 2019    Acute idiopathic gout of foot 2019    Arthritis     Colon polyp     Glaucoma     right eye    Hyperlipidemia     Hypertension     Inflamed skin tag 5/15/2017    Spinal stenosis     Thyroid disease     hypothyroidism       Past Surgical History:   Procedure Laterality Date    BREAST SURGERY Left     open breast biopsy    CHOLECYSTECTOMY      COLONOSCOPY      COLONOSCOPY N/A 4/19/2018    COLONOSCOPY POLYPECTOMY SNARE HOT BIOPSY performed by Nazario Garcia MD at [address]      TOTAL KNEE ARTHROPLASTY Bilateral          Family History   Problem Relation Age of Onset    Diabetes Mother     COPD Father         ? maybe heart also    Cancer Maternal Grandmother         ?uterine?  Other Other         cousin and maternal aunt with Alzhemimer's       CareTeam (Including outside providers/suppliers regularly involved in providing care):   Patient Care Team:  Bree Villegas MD as PCP - General  Bree Villegas MD as PCP - [facility] Empaneled Provider    Wt Readings from Last 3 Encounters:   01/19/22 227 lb 9.6 oz (103.2 kg)   10/13/21 246 lb 3.2 oz (111.7 kg)   10/06/21 245 lb 12.8 oz (111.5 kg)     Vitals:    01/19/22 1106   BP: 120/68   Pulse: 52   SpO2: 91%   Weight: 227 lb 9.6 oz (103.2 kg)   Height: 5' 3\" (1.6 m)     Body mass index is 40.32 kg/m². Based upon direct observation of the patient, evaluation of cognition reveals recent and remote memory intact. Physical Exam  Vitals and nursing note reviewed. Constitutional:       General: She is not in acute distress. Appearance: She is well-developed. She is not ill-appearing. HENT:      Head: Normocephalic and atraumatic. Right Ear: External ear normal.      Left Ear: External ear normal.   Eyes:      General: No scleral icterus. Right eye: No discharge. Left eye: No discharge. Conjunctiva/sclera: Conjunctivae normal.      Pupils: Pupils are equal, round, and reactive to light. Neck:      Thyroid: No thyromegaly.       Trachea: No tracheal deviation. Cardiovascular:      Rate and Rhythm: Normal rate and regular rhythm. Heart sounds: Normal heart sounds. Pulmonary:      Effort: Pulmonary effort is normal. No respiratory distress. Breath sounds: Normal breath sounds. No wheezing. Lymphadenopathy:      Cervical: No cervical adenopathy. Skin:     General: Skin is warm. Findings: No rash. Neurological:      Mental Status: She is alert and oriented to person, place, and time. Psychiatric:         Mood and Affect: Mood normal.         Behavior: Behavior normal.         Thought Content: Thought content normal.         Patient's complete Health Risk Assessment and screening values have been reviewed and are found in Flowsheets. The following problems were reviewed today and where indicated follow up appointments were made and/or referrals ordered. Positive Risk Factor Screenings with Interventions:              General Health and ACP:  General  In general, how would you say your health is?: Very Good  In the past 7 days, have you experienced any of the following?  New or Increased Pain, New or Increased Fatigue, Loneliness, Social Isolation, Stress or Anger?: None of These  Do you get the social and emotional support that you need?: Yes  Do you have a Living Will?: (!) No  Advance Directives     Power of [address] Will ACP-Advance Directive ACP-Power of     Not on File Not on File Not on File Not on File      General Health Risk Interventions:  · does not have POA-HC- working on things with     Health Habits/Nutrition:  Health Habits/Nutrition  Do you exercise for at least 20 minutes 2-3 times per week?: (!) No  Have you lost any weight without trying in the past 3 months?: No  Do you eat only one meal per day?: No  Have you seen the dentist within the past year?: Yes  Body mass index: (!) 40.31  Health Habits/Nutrition Interventions:  · working on stretching more     Safety:  Safety  Do you have working smoke detectors?: Yes  Have all throw rugs been removed or fastened?: Yes  Do you have non-slip mats or surfaces in all bathtubs/showers?: (!) No  Do all of your stairways have a railing or banister?: Yes  Are your doorways, halls and stairs free of clutter?: Yes  Do you always fasten your seatbelt when you are in a car?: Yes  Safety Interventions:  · Home safety tips provided       Personalized Preventive Plan   Current Health Maintenance Status  Immunization History   Administered Date(s) Administered    COVID-19, Cristian Ambrose, Primary or Immunocompromised, PF, 100mcg/0.5mL 02/05/2021, 03/02/2021, 11/01/2021    Influenza A (P0C3-18) Vaccine PF IM 12/17/2009    Influenza Vaccine, unspecified formulation 11/14/2008, 09/11/2009, 10/22/2010, 10/11/2012, 09/23/2013, 09/05/2014, 10/02/2015, 10/14/2016    Influenza Virus Vaccine 09/11/2009, 10/22/2010, 10/11/2012, 09/23/2013, 09/05/2014, 10/02/2015    Influenza, High Dose (Fluzone 65 yrs and older) 10/14/2016, 10/09/2017, 09/12/2018    Influenza, Quadv, adjuvanted, 65 yrs +, IM, PF (Fluad) 09/09/2020, 10/22/2020, 10/06/2021    Influenza, Triv, inactivated, subunit, adjuvanted, IM (Fluad 65 yrs and older) 08/23/2019    Pneumococcal Conjugate 13-valent (Dildpli18) 01/13/2017    Pneumococcal Polysaccharide ([id number]) 10/21/2005, 02/03/2012    Td (Adult), 5 Lf Tetanus Toxoid, Pf (Tenivac, Decavac) 11/14/2003    Td, unspecified formulation 11/14/2003    Tdap (Boostrix, Adacel) 07/11/2011    Zoster Live (Zostavax) 07/11/2011    Zoster Recombinant (Shingrix) 05/09/2019, 10/22/2020        Health Maintenance   Topic Date Due    Hepatitis C screen  Never done    Depression Monitoring  Never done    Lipid screen  05/29/2021    Annual Wellness Visit (AWV)  06/04/2021    DTaP/Tdap/Td vaccine (2 - Td or Tdap) 07/11/2021    TSH testing  06/28/2022    Potassium monitoring  06/28/2022    Creatinine monitoring  06/28/2022    DEXA (modify frequency per FRAX score)  Completed  Flu vaccine  Completed    Shingles Vaccine  Completed    Pneumococcal 65+ years Vaccine  Completed    COVID-19 Vaccine  Completed    Hepatitis A vaccine  Aged Out    Hepatitis B vaccine  Aged Out    Hib vaccine  Aged Out    Meningococcal (ACWY) vaccine  Aged Out     Recommendations for [employer] Due: see orders and patient instructions/AVS.  . Recommended screening schedule for the next 5-10 years is provided to the patient in written form: see Patient Instructions/AVS.    Migel Chavez was seen today for medicare awv.     Diagnoses and all orders for this visit:    Need for vaccination  -     Tetanus-Diphth-Acell Pertussis (BOOSTRIX) injection 0.5 mL    Bilateral sacroiliitis (HCC)    Recurrent major depressive disorder, in partial remission ([facility] [address]    Medicare annual wellness visit, subsequent

## 2022-03-13 DIAGNOSIS — I10 ESSENTIAL HYPERTENSION: Chronic | ICD-10-CM

## 2022-03-14 RX ORDER — LOSARTAN POTASSIUM 50 MG/1
TABLET ORAL
Qty: 90 TABLET | Refills: 3 | Status: SHIPPED | OUTPATIENT
Start: 2022-03-14

## 2022-03-14 RX ORDER — HYDROCHLOROTHIAZIDE 12.5 MG/1
CAPSULE, GELATIN COATED ORAL
Qty: 90 CAPSULE | Refills: 3 | Status: SHIPPED | OUTPATIENT
Start: 2022-03-14

## 2022-04-18 RX ORDER — SIMVASTATIN 20 MG
TABLET ORAL
Qty: 90 TABLET | Refills: 3 | Status: SHIPPED | OUTPATIENT
Start: 2022-04-18

## 2022-04-27 ENCOUNTER — OFFICE VISIT (OUTPATIENT)
Dept: PRIMARY CARE CLINIC | Age: 77
End: 2022-04-27
Payer: MEDICARE

## 2022-04-27 VITALS
OXYGEN SATURATION: 94 % | WEIGHT: 232.4 LBS | BODY MASS INDEX: 41.17 KG/M2 | SYSTOLIC BLOOD PRESSURE: 118 MMHG | HEART RATE: 86 BPM | DIASTOLIC BLOOD PRESSURE: 80 MMHG

## 2022-04-27 DIAGNOSIS — F32.A DEPRESSION, UNSPECIFIED DEPRESSION TYPE: ICD-10-CM

## 2022-04-27 DIAGNOSIS — N18.30 STAGE 3 CHRONIC KIDNEY DISEASE, UNSPECIFIED WHETHER STAGE 3A OR 3B CKD (HCC): ICD-10-CM

## 2022-04-27 DIAGNOSIS — E03.9 ACQUIRED HYPOTHYROIDISM: ICD-10-CM

## 2022-04-27 DIAGNOSIS — E66.01 OBESITY, CLASS III, BMI 40-49.9 (MORBID OBESITY) (HCC): ICD-10-CM

## 2022-04-27 DIAGNOSIS — I10 PRIMARY HYPERTENSION: Primary | ICD-10-CM

## 2022-04-27 PROCEDURE — 99214 OFFICE O/P EST MOD 30 MIN: CPT | Performed by: FAMILY MEDICINE

## 2022-04-27 RX ORDER — ESCITALOPRAM OXALATE 5 MG/1
5 TABLET ORAL DAILY
Qty: 30 TABLET | Refills: 3 | Status: SHIPPED | OUTPATIENT
Start: 2022-04-27 | End: 2022-08-22

## 2022-04-27 ASSESSMENT — PATIENT HEALTH QUESTIONNAIRE - PHQ9
8. MOVING OR SPEAKING SO SLOWLY THAT OTHER PEOPLE COULD HAVE NOTICED. OR THE OPPOSITE, BEING SO FIGETY OR RESTLESS THAT YOU HAVE BEEN MOVING AROUND A LOT MORE THAN USUAL: 0
3. TROUBLE FALLING OR STAYING ASLEEP: 0
SUM OF ALL RESPONSES TO PHQ QUESTIONS 1-9: 10
9. THOUGHTS THAT YOU WOULD BE BETTER OFF DEAD, OR OF HURTING YOURSELF: 0
6. FEELING BAD ABOUT YOURSELF - OR THAT YOU ARE A FAILURE OR HAVE LET YOURSELF OR YOUR FAMILY DOWN: 0
5. POOR APPETITE OR OVEREATING: 3
10. IF YOU CHECKED OFF ANY PROBLEMS, HOW DIFFICULT HAVE THESE PROBLEMS MADE IT FOR YOU TO DO YOUR WORK, TAKE CARE OF THINGS AT HOME, OR GET ALONG WITH OTHER PEOPLE: 0
SUM OF ALL RESPONSES TO PHQ QUESTIONS 1-9: 10
4. FEELING TIRED OR HAVING LITTLE ENERGY: 3
1. LITTLE INTEREST OR PLEASURE IN DOING THINGS: 2
7. TROUBLE CONCENTRATING ON THINGS, SUCH AS READING THE NEWSPAPER OR WATCHING TELEVISION: 0
SUM OF ALL RESPONSES TO PHQ QUESTIONS 1-9: 10
SUM OF ALL RESPONSES TO PHQ9 QUESTIONS 1 & 2: 4
2. FEELING DOWN, DEPRESSED OR HOPELESS: 2
SUM OF ALL RESPONSES TO PHQ QUESTIONS 1-9: 10

## 2022-04-27 ASSESSMENT — ENCOUNTER SYMPTOMS
EYE REDNESS: 0
RHINORRHEA: 0
WHEEZING: 0
DIARRHEA: 0
COUGH: 0
SORE THROAT: 0
ABDOMINAL PAIN: 0
VOMITING: 0
NAUSEA: 0
SHORTNESS OF BREATH: 0
EYE DISCHARGE: 0

## 2022-04-27 ASSESSMENT — COLUMBIA-SUICIDE SEVERITY RATING SCALE - C-SSRS
1. WITHIN THE PAST MONTH, HAVE YOU WISHED YOU WERE DEAD OR WISHED YOU COULD GO TO SLEEP AND NOT WAKE UP?: NO
6. HAVE YOU EVER DONE ANYTHING, STARTED TO DO ANYTHING, OR PREPARED TO DO ANYTHING TO END YOUR LIFE?: NO
2. HAVE YOU ACTUALLY HAD ANY THOUGHTS OF KILLING YOURSELF?: NO

## 2022-04-27 NOTE — PROGRESS NOTES
[facility] CARE  [address] [facility] [address]  Dept: [id number] Jigna Mcclellan is a 64 y.o. female Established patient, who presents today for her medical conditions/complaints as noted below. Chief Complaint   Patient presents with    Hypertension     Patient doesn't check B/P at home    Hypothyroidism       HPI:     HPI- pt doing okay. No new issues. No refills needed. No issues with meds. Had next covid booster done 2 weeks ago. Reviewed prior notes None  Reviewed previous Labs    LDL Cholesterol (mg/dL)   Date Value   09/18/2013 104 (H)   05/15/2012 86     LDL Calculated (mg/dL)   Date Value   05/29/2020 87   06/18/2018 69   02/20/2017 66       (goal LDL is <100)   AST (U/L)   Date Value   06/28/2021 23     ALT (U/L)   Date Value   06/28/2021 23     BUN (mg/dL)   Date Value   06/28/2021 25     TSH (uIU/mL)   Date Value   06/28/2021 0.68     BP Readings from Last 3 Encounters:   04/27/22 118/80   01/19/22 120/68   10/13/21 118/68          (goal 120/80)    Past Medical History:   Diagnosis Date    Acute cystitis with hematuria 8/23/2019    Acute idiopathic gout of foot 8/23/2019    Arthritis     Colon polyp     Glaucoma     right eye    Hyperlipidemia     Hypertension     Inflamed skin tag 5/15/2017    Spinal stenosis     Thyroid disease     hypothyroidism      Past Surgical History:   Procedure Laterality Date    BREAST SURGERY Left     open breast biopsy    CHOLECYSTECTOMY      COLONOSCOPY      COLONOSCOPY N/A 4/19/2018    COLONOSCOPY POLYPECTOMY SNARE HOT BIOPSY performed by Kelly Woody MD at [facility]      TOTAL KNEE ARTHROPLASTY Bilateral        Family History   Problem Relation Age of Onset    Diabetes Mother     COPD Father         ? maybe heart also    Cancer Maternal Grandmother         ?uterine?     Other Other         cousin and maternal aunt with Alzhemimer's       Social History     Tobacco Use    Smoking status: Never Smoker    Smokeless tobacco: Never Used   Substance Use Topics    Alcohol use: Not Currently     Alcohol/week: 0.0 standard drinks     Comment: 10 years ago was last glass of wine      Current Outpatient Medications   Medication Sig Dispense Refill    escitalopram (LEXAPRO) 5 MG tablet Take 1 tablet by mouth daily 30 tablet 3    simvastatin (ZOCOR) 20 MG tablet TAKE 1 TABLET DAILY AT BEDTIME 90 tablet 3    hydroCHLOROthiazide (MICROZIDE) 12.5 MG capsule TAKE 1 CAPSULE EVERY MORNING 90 capsule 3    losartan (COZAAR) 50 MG tablet TAKE 1 TABLET DAILY 90 tablet 3    levothyroxine (SYNTHROID) 112 MCG tablet TAKE 1 TABLET DAILY 90 tablet 3    potassium chloride (KLOR-CON) 10 MEQ extended release tablet TAKE 2 TABLETS DAILY 180 tablet 3    allopurinol (ZYLOPRIM) 100 MG tablet TAKE 1 TABLET DAILY 90 tablet 3    Handicap Placard MISC by Does not apply route Exp 4-2-25 1 each 0    meloxicam (MOBIC) 7.5 MG tablet TAKE 1 TABLET BY MOUTH TWO TIMES A DAY AS NEEDED FOR PAIN 60 tablet 3    Calcium-Vitamin D 500-125 MG-UNIT TABS tablet      brimonidine (ALPHAGAN) 0.2 % ophthalmic solution 1 drop every 8 hours       No current facility-administered medications for this visit.      Allergies   Allergen Reactions    Ace Inhibitors      Adverse reaction: cough       Health Maintenance   Topic Date Due    Hepatitis C screen  Never done    Lipids  04/27/2023 (Originally 5/29/2021)    TSH  06/28/2022    Potassium  06/28/2022    Creatinine  06/28/2022    Depression Monitoring  01/19/2023    Annual Wellness Visit (AWV)  01/20/2023    DTaP/Tdap/Td vaccine (3 - Td or Tdap) 01/19/2032    DEXA (modify frequency per FRAX score)  Completed    Flu vaccine  Completed    Shingles Vaccine  Completed    Pneumococcal 65+ years Vaccine  Completed    COVID-19 Vaccine  Completed    Hepatitis A vaccine  Aged Out    Hepatitis B vaccine  Aged Out    Hib vaccine  Aged C/ [place] 19 Meningococcal (ACWY) vaccine  Aged Out       Subjective:      Review of Systems   Constitutional: Negative for chills and fever. HENT: Negative for rhinorrhea and sore throat. Eyes: Negative for discharge and redness. Respiratory: Negative for cough, shortness of breath and wheezing. Cardiovascular: Negative for chest pain and palpitations. Gastrointestinal: Negative for abdominal pain, diarrhea, nausea and vomiting. Genitourinary: Negative for dysuria and frequency. Musculoskeletal: Negative for arthralgias and myalgias. Neurological: Negative for dizziness, light-headedness and headaches. Psychiatric/Behavioral: Negative for sleep disturbance. Objective:     /80   Pulse 86   Wt 232 lb 6.4 oz (105.4 kg)   SpO2 94%   BMI 41.17 kg/m²   Physical Exam  Vitals and nursing note reviewed. Constitutional:       General: She is not in acute distress. Appearance: She is well-developed. She is not ill-appearing. HENT:      Head: Normocephalic and atraumatic. Right Ear: External ear normal.      Left Ear: External ear normal.   Eyes:      General: No scleral icterus. Right eye: No discharge. Left eye: No discharge. Conjunctiva/sclera: Conjunctivae normal.   Neck:      Thyroid: No thyromegaly. Trachea: No tracheal deviation. Cardiovascular:      Rate and Rhythm: Normal rate and regular rhythm. Heart sounds: Normal heart sounds. Pulmonary:      Effort: Pulmonary effort is normal. No respiratory distress. Breath sounds: Normal breath sounds. No wheezing. Lymphadenopathy:      Cervical: No cervical adenopathy. Skin:     General: Skin is warm. Findings: No rash. Neurological:      Mental Status: She is alert and oriented to person, place, and time. Psychiatric:         Mood and Affect: Mood normal.         Behavior: Behavior normal.         Thought Content: Thought content normal.         Assessment/Plan:   1.  Primary hypertension  -     CBC with Auto Differential; Future  -     Comprehensive Metabolic Panel; Future  -     Magnesium; Future  -     TSH; Future  2. Acquired hypothyroidism  -     CBC with Auto Differential; Future  -     Comprehensive Metabolic Panel; Future  -     Magnesium; Future  -     TSH; Future  3. Obesity, Class III, BMI 40-49.9 (morbid obesity) (HCC)  4. Stage 3 chronic kidney disease, unspecified whether stage 3a or 3b CKD ([facility] [address]  5. Depression, unspecified depression type       Return in about 6 weeks (around 6/8/2022) for medication f/u, HTN f/u. Orders Placed This Encounter   Procedures    CBC with Auto Differential     Standing Status:   Future     Standing Expiration Date:   4/28/2023    Comprehensive Metabolic Panel     Standing Status:   Future     Standing Expiration Date:   4/28/2023    Magnesium     Standing Status:   Future     Standing Expiration Date:   4/27/2023    TSH     Standing Status:   Future     Standing Expiration Date:   4/28/2023     Orders Placed This Encounter   Medications    escitalopram (LEXAPRO) 5 MG tablet     Sig: Take 1 tablet by mouth daily     Dispense:  30 tablet     Refill:  3       Patient given educational materials - see patient instructions. Discussed use, benefit, and side effects of prescribed medications. All patient questions answered. Pt voiced understanding. Reviewed health maintenance. Instructed to continue current medications, diet and exercise. Patient agreed with treatment plan. Follow up as directed.      Electronically signed by Amari Campo MD on 4/27/2022 at 9:58 AM

## 2022-06-03 ENCOUNTER — OFFICE VISIT (OUTPATIENT)
Dept: PRIMARY CARE CLINIC | Age: 77
End: 2022-06-03
Payer: MEDICARE

## 2022-06-03 VITALS
HEART RATE: 67 BPM | DIASTOLIC BLOOD PRESSURE: 64 MMHG | SYSTOLIC BLOOD PRESSURE: 122 MMHG | BODY MASS INDEX: 41.52 KG/M2 | WEIGHT: 234.4 LBS | OXYGEN SATURATION: 98 %

## 2022-06-03 DIAGNOSIS — F41.9 ANXIETY: Primary | ICD-10-CM

## 2022-06-03 DIAGNOSIS — B37.2 YEAST DERMATITIS: ICD-10-CM

## 2022-06-03 DIAGNOSIS — N18.30 STAGE 3 CHRONIC KIDNEY DISEASE, UNSPECIFIED WHETHER STAGE 3A OR 3B CKD (HCC): ICD-10-CM

## 2022-06-03 DIAGNOSIS — I10 PRIMARY HYPERTENSION: Chronic | ICD-10-CM

## 2022-06-03 PROCEDURE — 1123F ACP DISCUSS/DSCN MKR DOCD: CPT | Performed by: FAMILY MEDICINE

## 2022-06-03 PROCEDURE — 99213 OFFICE O/P EST LOW 20 MIN: CPT | Performed by: FAMILY MEDICINE

## 2022-06-03 RX ORDER — NYSTATIN 100000 [USP'U]/G
POWDER TOPICAL 4 TIMES DAILY
Qty: 60 G | Refills: 0
Start: 2022-06-03

## 2022-06-03 ASSESSMENT — ENCOUNTER SYMPTOMS
EYE DISCHARGE: 0
SORE THROAT: 0
COUGH: 0
RHINORRHEA: 0
SHORTNESS OF BREATH: 0
DIARRHEA: 0
EYE REDNESS: 0
ABDOMINAL PAIN: 0
VOMITING: 0
WHEEZING: 0
NAUSEA: 0

## 2022-06-03 NOTE — PROGRESS NOTES
[facility] PRIMARY CARE  [address]  [facility] [address]  Dept: [id number] Jigna Mcclellan is a 64 y.o. female Established patient, who presents today for her medical conditions/complaints as noted below. Chief Complaint   Patient presents with    Hypertension     Patient doesn't check B/P at home    Rash     Rash under breast. She has been using nystatin powder.  Depression     Patient stated that she is not sure if medication is helping        HPI:     HPI - got nystatin powder when she went for her colonoscopy because her rash was bad then. Has trouble getting it to go where she wants, but seems to be helping some. No refills needed. Cousin just passed of Alzheimer's so concerned about family history.             Reviewed prior notes: None   Reviewed previous:  Labs    LDL Cholesterol (mg/dL)   Date Value   09/18/2013 104 (H)   05/15/2012 86     LDL Calculated (mg/dL)   Date Value   05/29/2020 87   06/18/2018 69   02/20/2017 66       (goal LDL is <100)   AST (U/L)   Date Value   06/28/2021 23     ALT (U/L)   Date Value   06/28/2021 23     BUN (mg/dL)   Date Value   06/28/2021 25     TSH (uIU/mL)   Date Value   06/28/2021 0.68     BP Readings from Last 3 Encounters:   06/03/22 122/64   04/27/22 118/80   01/19/22 120/68          (goal 120/80)    Past Medical History:   Diagnosis Date    Acute cystitis with hematuria 8/23/2019    Acute idiopathic gout of foot 8/23/2019    Arthritis     Colon polyp     Glaucoma     right eye    Hyperlipidemia     Hypertension     Inflamed skin tag 5/15/2017    Spinal stenosis     Thyroid disease     hypothyroidism      Past Surgical History:   Procedure Laterality Date    BREAST SURGERY Left     open breast biopsy    CHOLECYSTECTOMY      COLONOSCOPY      COLONOSCOPY N/A 4/19/2018    COLONOSCOPY POLYPECTOMY SNARE HOT BIOPSY performed by Mirlande Elias MD at [facility]  TOTAL KNEE ARTHROPLASTY Bilateral        Family History   Problem Relation Age of Onset    Diabetes Mother     COPD Father         ? maybe heart also    Cancer Maternal Grandmother         ?uterine?  Other Other         cousin and maternal aunt with Vinh's       Social History     Tobacco Use    Smoking status: Never Smoker    Smokeless tobacco: Never Used   Substance Use Topics    Alcohol use: Not Currently     Alcohol/week: 0.0 standard drinks     Comment: 10 years ago was last glass of wine      Current Outpatient Medications   Medication Sig Dispense Refill    nystatin (MYCOSTATIN) 838531 UNIT/GM powder Apply topically 4 times daily Apply 3 times daily. 60 g 0    escitalopram (LEXAPRO) 5 MG tablet Take 1 tablet by mouth daily 30 tablet 3    simvastatin (ZOCOR) 20 MG tablet TAKE 1 TABLET DAILY AT BEDTIME 90 tablet 3    hydroCHLOROthiazide (MICROZIDE) 12.5 MG capsule TAKE 1 CAPSULE EVERY MORNING 90 capsule 3    losartan (COZAAR) 50 MG tablet TAKE 1 TABLET DAILY 90 tablet 3    levothyroxine (SYNTHROID) 112 MCG tablet TAKE 1 TABLET DAILY 90 tablet 3    potassium chloride (KLOR-CON) 10 MEQ extended release tablet TAKE 2 TABLETS DAILY 180 tablet 3    allopurinol (ZYLOPRIM) 100 MG tablet TAKE 1 TABLET DAILY 90 tablet 3    Handicap Placard MISC by Does not apply route Exp 4-2-25 1 each 0    Calcium-Vitamin D 500-125 MG-UNIT TABS tablet      brimonidine (ALPHAGAN) 0.2 % ophthalmic solution 1 drop every 8 hours       No current facility-administered medications for this visit.      Allergies   Allergen Reactions    Ace Inhibitors      Adverse reaction: cough       Health Maintenance   Topic Date Due    Hepatitis C screen  Never done    Lipids  04/27/2023 (Originally 5/29/2021)    Annual Wellness Visit (AWV)  01/20/2023    Depression Monitoring  04/27/2023    DTaP/Tdap/Td vaccine (3 - Td or Tdap) 01/19/2032    DEXA (modify frequency per FRAX score)  Completed    Flu vaccine  Completed  Shingles vaccine  Completed    Pneumococcal 65+ years Vaccine  Completed    COVID-19 Vaccine  Completed    Hepatitis A vaccine  Aged Out    Hepatitis B vaccine  Aged Out    Hib vaccine  Aged Out    Meningococcal (ACWY) vaccine  Aged Out       Subjective:      Review of Systems   Constitutional: Negative for chills and fever. HENT: Negative for rhinorrhea and sore throat. Eyes: Negative for discharge and redness. Respiratory: Negative for cough, shortness of breath and wheezing. Cardiovascular: Negative for chest pain and palpitations. Gastrointestinal: Negative for abdominal pain, diarrhea, nausea and vomiting. Genitourinary: Negative for dysuria and frequency. Musculoskeletal: Negative for arthralgias and myalgias. Neurological: Negative for dizziness, light-headedness and headaches. Psychiatric/Behavioral: Negative for sleep disturbance. Objective:     /64   Pulse 67   Wt 234 lb 6.4 oz (106.3 kg)   SpO2 98%   BMI 41.52 kg/m²   Physical Exam  Vitals and nursing note reviewed. Constitutional:       General: She is not in acute distress. Appearance: She is well-developed. She is not ill-appearing. HENT:      Head: Normocephalic and atraumatic. Right Ear: External ear normal.      Left Ear: External ear normal.   Eyes:      General: No scleral icterus. Right eye: No discharge. Left eye: No discharge. Conjunctiva/sclera: Conjunctivae normal.   Neck:      Thyroid: No thyromegaly. Trachea: No tracheal deviation. Cardiovascular:      Rate and Rhythm: Normal rate and regular rhythm. Heart sounds: Normal heart sounds. Pulmonary:      Effort: Pulmonary effort is normal. No respiratory distress. Breath sounds: Normal breath sounds. No wheezing. Lymphadenopathy:      Cervical: No cervical adenopathy. Skin:     General: Skin is warm. Findings: No rash.    Neurological:      Mental Status: She is alert and oriented to person, place, and time. Psychiatric:         Mood and Affect: Mood normal.         Behavior: Behavior normal.         Thought Content: Thought content normal.         Assessment/Plan:   1. Anxiety  Comments:  continue same meds  2. Yeast dermatitis  Assessment & Plan:   continue topical either powder or cream and use for about 10 days after rash is gone. 3. Primary hypertension  Assessment & Plan:   Well-controlled, continue current medications  Orders:  -     Comprehensive Metabolic Panel; Future  -     CBC with Auto Differential; Future  -     Magnesium; Future  4. Stage 3 chronic kidney disease, unspecified whether stage 3a or 3b CKD ([facility] [address]  Assessment & Plan:   continue to watch - labs ordered       No follow-ups on file. Data Unavailable     Orders Placed This Encounter   Procedures    Comprehensive Metabolic Panel     Standing Status:   Future     Standing Expiration Date:   6/4/2023    CBC with Auto Differential     Standing Status:   Future     Standing Expiration Date:   6/4/2023    Magnesium     Standing Status:   Future     Standing Expiration Date:   6/3/2023     Orders Placed This Encounter   Medications    nystatin (MYCOSTATIN) 277687 UNIT/GM powder     Sig: Apply topically 4 times daily Apply 3 times daily. Dispense:  60 g     Refill:  0       Patient given educational materials - see patient instructions. Discussed use, benefit, and side effects of prescribed medications. All patient questions answered. Pt voiced understanding. Reviewed health maintenance. Instructed to continue current medications, diet and exercise. Patient agreed with treatment plan. Follow up as directed.      Electronically signed by Marie Yoon MD on 6/3/2022 at 2:28 PM

## 2022-06-15 ENCOUNTER — TELEPHONE (OUTPATIENT)
Dept: PRIMARY CARE CLINIC | Age: 77
End: 2022-06-15

## 2022-06-15 NOTE — TELEPHONE ENCOUNTER
----- Message from Jazmine Freyalexandra sent at 6/15/2022  9:32 AM EDT -----  Subject: Message to Provider    QUESTIONS  Information for Provider? Patient has a question and would like a phone   call from the office. ---------------------------------------------------------------------------  --------------  [employer] INFO  What is the best way for the office to contact you? OK to leave message on   voicemail  Preferred Call Back Phone Number?  [phone]  ---------------------------------------------------------------------------  --------------  SCRIPT ANSWERS  undefined

## 2022-06-15 NOTE — TELEPHONE ENCOUNTER
has flu like symptoms. She is asking if he can be seen today -  is not a patient of our office. His doctor is unable to see him today. Recommended Urgent Care for same day appointment.

## 2022-08-22 RX ORDER — ESCITALOPRAM OXALATE 5 MG/1
5 TABLET ORAL DAILY
Qty: 30 TABLET | Refills: 3 | Status: SHIPPED | OUTPATIENT
Start: 2022-08-22 | End: 2022-09-14 | Stop reason: SINTOL

## 2022-09-10 LAB
ABSOLUTE BASO #: 0.08 K/UL (ref 0–0.2)
ABSOLUTE EOS #: 0.41 K/UL (ref 0–0.5)
ABSOLUTE LYMPH #: 1.7 K/UL (ref 1–4)
ABSOLUTE MONO #: 0.9 K/UL (ref 0.2–1)
ABSOLUTE NEUT #: 6.45 K/UL (ref 1.5–7.5)
ALBUMIN SERPL-MCNC: 4.2 G/DL (ref 3.5–5.2)
ALK PHOSPHATASE: 76 U/L (ref 40–142)
ALT SERPL-CCNC: 21 U/L (ref 5–40)
ANION GAP SERPL CALCULATED.3IONS-SCNC: 9 MEQ/L (ref 7–16)
AST SERPL-CCNC: 21 U/L (ref 9–40)
BASOPHILS RELATIVE PERCENT: 0.8 %
BILIRUB SERPL-MCNC: 0.3 MG/DL
BUN BLDV-MCNC: 21 MG/DL (ref 8–23)
CALCIUM SERPL-MCNC: 9.8 MG/DL (ref 8.5–10.5)
CHLORIDE BLD-SCNC: 105 MEQ/L (ref 95–107)
CO2: 27 MEQ/L (ref 19–31)
CREAT SERPL-MCNC: 1.29 MG/DL (ref 0.6–1.3)
EGFR IF NONAFRICAN AMERICAN: 42 ML/MIN/1.73
EOSINOPHILS RELATIVE PERCENT: 4.3 %
GLUCOSE: 150 MG/DL (ref 70–99)
HCT VFR BLD CALC: 41.8 % (ref 34–45)
HEMOGLOBIN: 13.7 G/DL (ref 11.5–15.5)
LYMPHOCYTE %: 17.7 %
MAGNESIUM: 1.8 MG/DL (ref 1.6–2.6)
MCH RBC QN AUTO: 29.3 PG (ref 25–33)
MCHC RBC AUTO-ENTMCNC: 32.8 G/DL (ref 31–36)
MCV RBC AUTO: 89.3 FL (ref 80–99)
MONOCYTES # BLD: 9.4 %
NEUTROPHILS RELATIVE PERCENT: 67.3 %
PDW BLD-RTO: 13.1 % (ref 11.5–15)
PLATELETS: 287 K/UL (ref 130–400)
PMV BLD AUTO: 11.7 FL (ref 9.3–13)
POTASSIUM SERPL-SCNC: 4.5 MEQ/L (ref 3.5–5.4)
RBC: 4.68 M/UL (ref 3.8–5.4)
SODIUM BLD-SCNC: 141 MEQ/L (ref 133–146)
TOTAL PROTEIN: 6.3 G/DL (ref 6.1–8.3)
WBC: 9.6 K/UL (ref 3.5–11)

## 2022-09-12 RX ORDER — ALLOPURINOL 100 MG/1
TABLET ORAL
Qty: 90 TABLET | Refills: 0 | Status: SHIPPED | OUTPATIENT
Start: 2022-09-12 | End: 2022-11-28

## 2022-09-14 ENCOUNTER — OFFICE VISIT (OUTPATIENT)
Dept: PRIMARY CARE CLINIC | Age: 77
End: 2022-09-14
Payer: MEDICARE

## 2022-09-14 VITALS
BODY MASS INDEX: 42.34 KG/M2 | DIASTOLIC BLOOD PRESSURE: 62 MMHG | SYSTOLIC BLOOD PRESSURE: 124 MMHG | HEART RATE: 75 BPM | WEIGHT: 239 LBS | OXYGEN SATURATION: 97 %

## 2022-09-14 DIAGNOSIS — M54.16 LUMBAR RADICULOPATHY: Chronic | ICD-10-CM

## 2022-09-14 DIAGNOSIS — R73.9 HYPERGLYCEMIA: ICD-10-CM

## 2022-09-14 DIAGNOSIS — F33.41 RECURRENT MAJOR DEPRESSIVE DISORDER, IN PARTIAL REMISSION (HCC): ICD-10-CM

## 2022-09-14 DIAGNOSIS — I10 PRIMARY HYPERTENSION: Primary | Chronic | ICD-10-CM

## 2022-09-14 DIAGNOSIS — Z23 NEED FOR VACCINATION: ICD-10-CM

## 2022-09-14 DIAGNOSIS — R53.82 CHRONIC FATIGUE: ICD-10-CM

## 2022-09-14 DIAGNOSIS — M72.2 PLANTAR FASCIITIS: ICD-10-CM

## 2022-09-14 LAB — HBA1C MFR BLD: 6 %

## 2022-09-14 PROCEDURE — 1123F ACP DISCUSS/DSCN MKR DOCD: CPT | Performed by: FAMILY MEDICINE

## 2022-09-14 PROCEDURE — 90694 VACC AIIV4 NO PRSRV 0.5ML IM: CPT | Performed by: FAMILY MEDICINE

## 2022-09-14 PROCEDURE — 99214 OFFICE O/P EST MOD 30 MIN: CPT | Performed by: FAMILY MEDICINE

## 2022-09-14 PROCEDURE — 83036 HEMOGLOBIN GLYCOSYLATED A1C: CPT | Performed by: FAMILY MEDICINE

## 2022-09-14 PROCEDURE — G0008 ADMIN INFLUENZA VIRUS VAC: HCPCS | Performed by: FAMILY MEDICINE

## 2022-09-14 ASSESSMENT — ENCOUNTER SYMPTOMS
SHORTNESS OF BREATH: 0
RHINORRHEA: 0
NAUSEA: 0
COUGH: 0
DIARRHEA: 0
ABDOMINAL PAIN: 0
WHEEZING: 0
EYE DISCHARGE: 0
SORE THROAT: 0
EYE REDNESS: 0
BACK PAIN: 1
VOMITING: 0

## 2022-09-14 NOTE — ASSESSMENT & PLAN NOTE
side effects from lexapro- will d/c and see how she does. could start some prozac if wants to start something else.

## 2022-09-14 NOTE — ASSESSMENT & PLAN NOTE
worse if she is active. muscle pain- states she knows would probably feel better if she exercised a bit. Getting dinner or taking garbage out- then has to sit down for a while.

## 2022-09-14 NOTE — PROGRESS NOTES
COLONOSCOPY N/A 4/19/2018    COLONOSCOPY POLYPECTOMY SNARE HOT BIOPSY [facility] [address] [facility] [address] ARTHROPLASTY Bilateral        Family History   Problem Relation Age of Onset    Diabetes Mother     COPD Father         ? [de-identified] heart also    Cancer Maternal Grandmother         ?uterine? Other Other         cousin and maternal aunt with Alzhemimer's       Social History     Tobacco Use    Smoking status: Never    Smokeless tobacco: Never   Substance Use Topics    Alcohol use: Not Currently     Alcohol/week: 0.0 standard drinks     Comment: 10 years ago was last glass of wine      Current Outpatient Medications   Medication Sig Dispense Refill    Multiple Vitamin (MULTI-VITAMIN DAILY PO) Take by mouth      allopurinol (ZYLOPRIM) 100 MG tablet TAKE 1 TABLET DAILY 90 tablet 0    nystatin (MYCOSTATIN) 304197 UNIT/GM powder Apply topically 4 times daily Apply 3 times daily. 60 g 0    simvastatin (ZOCOR) 20 MG tablet TAKE 1 TABLET DAILY AT BEDTIME 90 tablet 3    hydroCHLOROthiazide (MICROZIDE) 12.5 MG capsule TAKE 1 CAPSULE EVERY MORNING 90 capsule 3    losartan (COZAAR) 50 MG tablet TAKE 1 TABLET DAILY 90 tablet 3    levothyroxine (SYNTHROID) 112 MCG tablet TAKE 1 TABLET DAILY 90 tablet 3    potassium chloride (KLOR-CON) 10 MEQ extended release tablet TAKE 2 TABLETS DAILY 180 tablet 3    Handicap Placard MISC by Does not apply route Exp 4-2-25 1 each 0    Calcium-Vitamin D 500-125 MG-UNIT TABS tablet      brimonidine (ALPHAGAN) 0.2 % ophthalmic solution 1 drop every 8 hours       No current facility-administered medications for this visit.      Allergies   Allergen Reactions    Ace Inhibitors      Adverse reaction: cough       Health Maintenance   Topic Date Due    Hepatitis C screen  Never done    Flu vaccine (1) 09/01/2022    Lipids  04/27/2023 (Originally 5/29/2021)    Annual Wellness Visit (AWV)  01/20/2023    Depression Monitoring  04/27/2023 DTaP/Tdap/Td vaccine (3 - Td or Tdap) 01/19/2032    DEXA (modify frequency per FRAX score)  Completed    Shingles vaccine  Completed    Pneumococcal 65+ years Vaccine  Completed    COVID-19 Vaccine  Completed    Hepatitis A vaccine  Aged Out    Hepatitis B vaccine  Aged Out    Hib vaccine  Aged Out    Meningococcal (ACWY) vaccine  Aged Out       Subjective:      Review of Systems   Constitutional:  Positive for fatigue. Negative for chills and fever. HENT:  Negative for rhinorrhea and sore throat. Eyes:  Negative for discharge and redness. Respiratory:  Negative for cough, shortness of breath and wheezing. Cardiovascular:  Negative for chest pain and palpitations. Gastrointestinal:  Negative for abdominal pain, diarrhea, nausea and vomiting. Genitourinary:  Negative for dysuria and frequency. Musculoskeletal:  Positive for back pain, gait problem and myalgias. Negative for arthralgias. Neurological:  Negative for dizziness, light-headedness and headaches. Psychiatric/Behavioral:  Negative for sleep disturbance. Objective:     /62   Pulse 75   Wt 239 lb (108.4 kg)   SpO2 97%   BMI 42.34 kg/m²   Physical Exam  Vitals and nursing note reviewed. Constitutional:       General: She is not in acute distress. Appearance: She is well-developed. She is not ill-appearing. HENT:      Head: Normocephalic and atraumatic. Right Ear: External ear normal.      Left Ear: External ear normal.   Eyes:      General: No scleral icterus. Right eye: No discharge. Left eye: No discharge. Conjunctiva/sclera: Conjunctivae normal.   Neck:      Thyroid: No thyromegaly. Trachea: No tracheal deviation. Cardiovascular:      Rate and Rhythm: Normal rate and regular rhythm. Heart sounds: Normal heart sounds. Pulmonary:      Effort: Pulmonary effort is normal. No respiratory distress. Breath sounds: Normal breath sounds. No wheezing.    Musculoskeletal: General: No tenderness (no point tenderness of elbow. ). Lymphadenopathy:      Cervical: No cervical adenopathy. Skin:     General: Skin is warm. Findings: No rash. Neurological:      Mental Status: She is alert and oriented to person, place, and time. Psychiatric:         Mood and Affect: Mood normal.         Behavior: Behavior normal.         Thought Content: Thought content normal.       Assessment/Plan:   1. Primary hypertension  Assessment & Plan:   Well-controlled, continue current medications  2. Need for vaccination  -     Influenza, FLUAD, (age 72 y+), IM, PF, 0.5 mL  3. Recurrent major depressive disorder, in partial remission ([facility] [address]  Assessment & Plan:   side effects from lexapro- will d/c and see how she does. could start some prozac if wants to start something else. 4. Lumbar radiculopathy  Assessment & Plan:   worse if she is active. muscle pain- states she knows would probably feel better if she exercised a bit. Getting dinner or taking garbage out- then has to sit down for a while. 5. Hyperglycemia  -     POCT glycosylated hemoglobin (Hb A1C)  6. Chronic fatigue  Assessment & Plan:   stop lexapro and if not better may want to consider sleep testing or sugar issues (hypoerglycemia on last labs)  7. Plantar fasciitis  Comments:  exercises given       Return in about 3 months (around 12/14/2022) for medication f/u. Data Unavailable     Orders Placed This Encounter   Procedures    Influenza, FLUAD, (age 72 y+), IM, PF, 0.5 mL    POCT glycosylated hemoglobin (Hb A1C)     No orders of the defined types were placed in this encounter. Patient given educational materials - see patient instructions. Discussed use, benefit, and side effects of prescribed medications. All patient questions answered. Pt voiced understanding. Reviewed health maintenance. Instructed to continue current medications, diet and exercise. Patient agreed with treatment plan. Follow up as directed.

## 2022-09-19 RX ORDER — POTASSIUM CHLORIDE 750 MG/1
TABLET, FILM COATED, EXTENDED RELEASE ORAL
Qty: 180 TABLET | Refills: 3 | Status: SHIPPED | OUTPATIENT
Start: 2022-09-19

## 2022-10-28 ENCOUNTER — TELEPHONE (OUTPATIENT)
Dept: PRIMARY CARE CLINIC | Age: 77
End: 2022-10-28

## 2022-10-28 NOTE — TELEPHONE ENCOUNTER
Pt states she has had diarrhea x2 days, has not tried anything otc. All she ate yesterday was applesauce. Asking what you recommend?  [facility] [place]

## 2022-10-28 NOTE — TELEPHONE ENCOUNTER
Usually will run its course after 2-3 days, but could take some Pepto or imodium if she wants to. If getting fevers or abd pain, or is not getting better let me know.

## 2022-10-31 RX ORDER — LEVOTHYROXINE SODIUM 112 UG/1
TABLET ORAL
Qty: 90 TABLET | Refills: 3 | Status: SHIPPED | OUTPATIENT
Start: 2022-10-31

## 2022-11-10 ENCOUNTER — OFFICE VISIT (OUTPATIENT)
Dept: PRIMARY CARE CLINIC | Age: 77
End: 2022-11-10
Payer: MEDICARE

## 2022-11-10 VITALS
DIASTOLIC BLOOD PRESSURE: 66 MMHG | BODY MASS INDEX: 41.36 KG/M2 | OXYGEN SATURATION: 94 % | WEIGHT: 233.4 LBS | SYSTOLIC BLOOD PRESSURE: 98 MMHG | HEIGHT: 63 IN | HEART RATE: 102 BPM

## 2022-11-10 DIAGNOSIS — R19.7 DIARRHEA, UNSPECIFIED TYPE: Primary | ICD-10-CM

## 2022-11-10 PROCEDURE — 3078F DIAST BP <80 MM HG: CPT | Performed by: FAMILY MEDICINE

## 2022-11-10 PROCEDURE — 1123F ACP DISCUSS/DSCN MKR DOCD: CPT | Performed by: FAMILY MEDICINE

## 2022-11-10 PROCEDURE — 3074F SYST BP LT 130 MM HG: CPT | Performed by: FAMILY MEDICINE

## 2022-11-10 PROCEDURE — 99213 OFFICE O/P EST LOW 20 MIN: CPT | Performed by: FAMILY MEDICINE

## 2022-11-10 ASSESSMENT — ENCOUNTER SYMPTOMS
SHORTNESS OF BREATH: 0
DIARRHEA: 1
BLOOD IN STOOL: 0
VOMITING: 0
NAUSEA: 0

## 2022-11-10 NOTE — PROGRESS NOTES
[address] [facility] PRIMARY CARE  [address] [facility] [address]  Dept: [id number] Jigna Mcclellan is a 64 y.o. female Established patient, who presents today for her medical conditions/complaints as noted below. Chief Complaint   Patient presents with    Abdominal Pain    Diarrhea     Pt states sx started x2 weeks. She states its gotten a little better. HPI:     HPI- was really having a lot of issue for the first week. Having a lot of cramping before and right after going. Laying down helps. Has tried [employer] That helped some. Worse after she stopped it. No vomiting. No fevers. Would go a few hours after eating each time and now is more in the middle of the night. No blood in stool. Had some pain in RLQ about a month ago- thought it may be her appendix, but then went away. None since.       Reviewed prior notes: None   Reviewed previous:  Labs    LDL Cholesterol (mg/dL)   Date Value   09/18/2013 104 (H)   05/15/2012 86     LDL Calculated (mg/dL)   Date Value   05/29/2020 87   06/18/2018 69   02/20/2017 66       (goal LDL is <100)   AST (U/L)   Date Value   09/09/2022 21     ALT (U/L)   Date Value   09/09/2022 21     BUN (mg/dL)   Date Value   09/09/2022 21     Hemoglobin A1C (%)   Date Value   09/14/2022 6.0     TSH (uIU/mL)   Date Value   06/28/2021 0.68     BP Readings from Last 3 Encounters:   11/10/22 98/66   09/14/22 124/62   06/03/22 122/64          (goal 120/80)    Past Medical History:   Diagnosis Date    Acute cystitis with hematuria 8/23/2019    Acute idiopathic gout of foot 8/23/2019    Arthritis     Colon polyp     Glaucoma     right eye    Hyperlipidemia     Hypertension     Inflamed skin tag 5/15/2017    Spinal stenosis     Thyroid disease     hypothyroidism      Past Surgical History:   Procedure Laterality Date    BREAST SURGERY Left     open breast biopsy    CHOLECYSTECTOMY      COLONOSCOPY      COLONOSCOPY N/A 4/19/2018 COLONOSCOPY POLYPECTOMY SNARE HOT BIOPSY performed [address] Lui [facility]ellan [facility] [address]THR[address] Bilateral        Family History   Problem Relation Age of Onset    Diabetes Mother     COPD Father         ? [de-identified] heart also    Cancer Maternal Grandmother         ?uterine? Other Other         cousin and maternal aunt with Vinh's       Social History     Tobacco Use    Smoking status: Never    Smokeless tobacco: Never   Substance Use Topics    Alcohol use: Not Currently     Alcohol/week: 0.0 standard drinks     Comment: 10 years ago was last glass of wine      Current Outpatient Medications   Medication Sig Dispense Refill    levothyroxine (SYNTHROID) 112 MCG tablet TAKE 1 TABLET DAILY 90 tablet 3    potassium chloride (KLOR-CON) 10 MEQ extended release tablet TAKE 2 TABLETS DAILY 180 tablet 3    Multiple Vitamin (MULTI-VITAMIN DAILY PO) Take by mouth      allopurinol (ZYLOPRIM) 100 MG tablet TAKE 1 TABLET DAILY 90 tablet 0    nystatin (MYCOSTATIN) 373967 UNIT/GM powder Apply topically 4 times daily Apply 3 times daily. 60 g 0    simvastatin (ZOCOR) 20 MG tablet TAKE 1 TABLET DAILY AT BEDTIME 90 tablet 3    hydroCHLOROthiazide (MICROZIDE) 12.5 MG capsule TAKE 1 CAPSULE EVERY MORNING 90 capsule 3    losartan (COZAAR) 50 MG tablet TAKE 1 TABLET DAILY 90 tablet 3    Handicap Placard MISC by Does not apply route Exp 4-2-25 1 each 0    Calcium-Vitamin D 500-125 MG-UNIT TABS tablet      brimonidine (ALPHAGAN) 0.2 % ophthalmic solution 1 drop every 8 hours       No current facility-administered medications for this visit.      Allergies   Allergen Reactions    Ace Inhibitors      Adverse reaction: cough       Health Maintenance   Topic Date Due    Hepatitis C screen  Never done    COVID-19 Vaccine (5 - Booster for Birtha Asper series) 06/02/2022    Lipids  04/27/2023 (Originally 5/29/2021)    Annual Wellness Visit (Rakesh Montoya 25)  01/20/2023    Depression Monitoring  04/27/2023 DTaP/Tdap/Td vaccine (3 - Td or Tdap) 01/19/2032    DEXA (modify frequency per FRAX score)  Completed    Flu vaccine  Completed    Shingles vaccine  Completed    Pneumococcal 65+ years Vaccine  Completed    Hepatitis A vaccine  Aged Out    Hib vaccine  Aged Out    Meningococcal (ACWY) vaccine  Aged Out       Subjective:      Review of Systems   Constitutional:  Negative for chills, fatigue and fever. HENT:  Negative for congestion. Respiratory:  Negative for shortness of breath. Cardiovascular:  Negative for chest pain and palpitations. Gastrointestinal:  Positive for diarrhea. Negative for blood in stool, nausea and vomiting. Neurological:  Negative for dizziness and light-headedness. Objective:     BP 98/66   Pulse (!) 102   Ht 5' 3\" (1.6 m)   Wt 233 lb 6.4 oz (105.9 kg)   SpO2 94%   BMI 41.34 kg/m²   Physical Exam  Vitals and nursing note reviewed. Constitutional:       Appearance: Normal appearance. HENT:      Head: Normocephalic and atraumatic. Eyes:      General: No scleral icterus. Right eye: No discharge. Conjunctiva/sclera: Conjunctivae normal.   Pulmonary:      Effort: Pulmonary effort is normal. No respiratory distress. Skin:     Coloration: Skin is not jaundiced or pale. Neurological:      General: No focal deficit present. Mental Status: She is alert. Psychiatric:         Mood and Affect: Mood normal.         Behavior: Behavior normal.         Thought Content: Thought content normal.       Assessment/Plan:   1. Diarrhea, unspecified type  -     Clostridium Difficile Toxin/Antigen; Future  -     Gastrointestinal Panel, Molecular; Future     Return if symptoms worsen or fail to improve.   Data Unavailable     Orders Placed This Encounter   Procedures    Clostridium Difficile Toxin/Antigen     Standing Status:   Future     Standing Expiration Date:   11/10/2023    Gastrointestinal Panel, Molecular     Standing Status:   Future     Standing Expiration Date: 11/10/2023     No orders of the defined types were placed in this encounter. Patient given educational materials - see patient instructions. Discussed use, benefit, and side effects of prescribed medications. All patient questions answered. Pt voiced understanding. Reviewed health maintenance. Instructed to continue current medications, diet and exercise. Patient agreed with treatment plan. Follow up as directed.      Electronically signed by Albino Boyd MD on 11/10/2022 at 2:05 PM

## 2022-11-11 ENCOUNTER — HOSPITAL ENCOUNTER (OUTPATIENT)
Age: 77
Setting detail: SPECIMEN
Discharge: HOME | End: 2022-11-11
Payer: MEDICARE

## 2022-11-11 DIAGNOSIS — R19.7 DIARRHEA, UNSPECIFIED TYPE: ICD-10-CM

## 2022-11-11 PROCEDURE — 87506 IADNA-DNA/RNA PROBE TQ 6-11: CPT

## 2022-11-12 LAB
CAMPYLOBACTER PCR: NORMAL
E COLI ENTEROTOXIGENIC PCR: NORMAL
PLESIOMONAS SHIGELLOIDES PCR: NORMAL
SALMONELLA PCR: NORMAL
SHIGATOXIN GENE PCR: NORMAL
SHIGELLA SP PCR: NORMAL
SPECIMEN DESCRIPTION: NORMAL
VIBRIO PCR: NORMAL
YERSINIA ENTEROCOLITICA PCR: NORMAL

## 2022-11-28 RX ORDER — ALLOPURINOL 100 MG/1
TABLET ORAL
Qty: 90 TABLET | Refills: 3 | Status: SHIPPED | OUTPATIENT
Start: 2022-11-28

## 2022-12-21 ENCOUNTER — OFFICE VISIT (OUTPATIENT)
Dept: PRIMARY CARE CLINIC | Age: 77
End: 2022-12-21
Payer: MEDICARE

## 2022-12-21 VITALS
OXYGEN SATURATION: 97 % | HEART RATE: 94 BPM | SYSTOLIC BLOOD PRESSURE: 128 MMHG | BODY MASS INDEX: 42.66 KG/M2 | WEIGHT: 240.8 LBS | DIASTOLIC BLOOD PRESSURE: 76 MMHG

## 2022-12-21 DIAGNOSIS — R23.8 SKIN IRRITATION: ICD-10-CM

## 2022-12-21 DIAGNOSIS — I10 PRIMARY HYPERTENSION: Primary | Chronic | ICD-10-CM

## 2022-12-21 DIAGNOSIS — E03.9 ACQUIRED HYPOTHYROIDISM: Chronic | ICD-10-CM

## 2022-12-21 DIAGNOSIS — N39.46 MIXED STRESS AND URGE URINARY INCONTINENCE: ICD-10-CM

## 2022-12-21 DIAGNOSIS — I10 ESSENTIAL HYPERTENSION: ICD-10-CM

## 2022-12-21 DIAGNOSIS — M54.16 LUMBAR RADICULOPATHY: Chronic | ICD-10-CM

## 2022-12-21 DIAGNOSIS — R73.9 HYPERGLYCEMIA: ICD-10-CM

## 2022-12-21 DIAGNOSIS — E55.9 VITAMIN D DEFICIENCY: ICD-10-CM

## 2022-12-21 PROCEDURE — 99214 OFFICE O/P EST MOD 30 MIN: CPT | Performed by: FAMILY MEDICINE

## 2022-12-21 PROCEDURE — 3078F DIAST BP <80 MM HG: CPT | Performed by: FAMILY MEDICINE

## 2022-12-21 PROCEDURE — 3074F SYST BP LT 130 MM HG: CPT | Performed by: FAMILY MEDICINE

## 2022-12-21 PROCEDURE — 1123F ACP DISCUSS/DSCN MKR DOCD: CPT | Performed by: FAMILY MEDICINE

## 2022-12-21 ASSESSMENT — ENCOUNTER SYMPTOMS
DIARRHEA: 0
CONSTIPATION: 0
COUGH: 1
NAUSEA: 0
SHORTNESS OF BREATH: 0
BACK PAIN: 1
VOMITING: 0

## 2022-12-21 NOTE — PROGRESS NOTES
[address] [facility] PRIMARY CARE  [address]  [facility] [address]  Dept: [id number] Jigna Mcclellan is a 76 y.o. female Established patient, who presents today for her medical conditions/complaints as noted below. Chief Complaint   Patient presents with    Hypertension       HPI:     HPI- pt feeling okay. SOB if rushing around. Pain is about the same as long as she doesn't do too much. Taking advil occas. Reviewed prior notes: None   Reviewed previous:  Labs and Imaging    LDL Cholesterol (mg/dL)   Date Value   09/18/2013 104 (H)   05/15/2012 86     LDL Calculated (mg/dL)   Date Value   05/29/2020 87   06/18/2018 69   02/20/2017 66       (goal LDL is <100)   AST (U/L)   Date Value   09/09/2022 21     ALT (U/L)   Date Value   09/09/2022 21     BUN (mg/dL)   Date Value   09/09/2022 21     Hemoglobin A1C (%)   Date Value   09/14/2022 6.0     TSH (uIU/mL)   Date Value   06/28/2021 0.68     BP Readings from Last 3 Encounters:   12/21/22 128/76   11/10/22 98/66   09/14/22 124/62          (goal 120/80)    Past Medical History:   Diagnosis Date    Acute cystitis with hematuria 8/23/2019    Acute idiopathic gout of foot 8/23/2019    Arthritis     Colon polyp     Glaucoma     right eye    Hyperlipidemia     Hypertension     Inflamed skin tag 5/15/2017    Spinal stenosis     Thyroid disease     hypothyroidism      Past Surgical History:   Procedure Laterality Date    BREAST SURGERY Left     open breast biopsy    CHOLECYSTECTOMY      COLONOSCOPY      COLONOSCOPY N/A 4/19/2018    COLONOSCOPY POLYPECTOMY SNARE HOT BIOPSY [facility] [address] [facility] Bilateral        Family History   Problem Relation Age of Onset    Diabetes Mother     COPD Father         ? [de-identified] heart also    Cancer Maternal Grandmother         ?uterine?     Other Other         cousin and maternal aunt with Alzhemimer's       Social History     Tobacco Use    Smoking status: Never    Smokeless tobacco: Never   Substance Use Topics    Alcohol use: Not Currently     Alcohol/week: 0.0 standard drinks     Comment: 10 years ago was last glass of wine      Current Outpatient Medications   Medication Sig Dispense Refill    allopurinol (ZYLOPRIM) 100 MG tablet TAKE 1 TABLET DAILY 90 tablet 3    levothyroxine (SYNTHROID) 112 MCG tablet TAKE 1 TABLET DAILY 90 tablet 3    potassium chloride (KLOR-CON) 10 MEQ extended release tablet TAKE 2 TABLETS DAILY 180 tablet 3    Multiple Vitamin (MULTI-VITAMIN DAILY PO) Take by mouth      nystatin (MYCOSTATIN) 412006 UNIT/GM powder Apply topically 4 times daily Apply 3 times daily. 60 g 0    simvastatin (ZOCOR) 20 MG tablet TAKE 1 TABLET DAILY AT BEDTIME 90 tablet 3    hydroCHLOROthiazide (MICROZIDE) 12.5 MG capsule TAKE 1 CAPSULE EVERY MORNING 90 capsule 3    losartan (COZAAR) 50 MG tablet TAKE 1 TABLET DAILY 90 tablet 3    Handicap Placard MISC by Does not apply route Exp 4-2-25 1 each 0    Calcium-Vitamin D 500-125 MG-UNIT TABS tablet      brimonidine (ALPHAGAN) 0.2 % ophthalmic solution 1 drop every 8 hours       No current facility-administered medications for this visit. Allergies   Allergen Reactions    Ace Inhibitors      Adverse reaction: cough       Health Maintenance   Topic Date Due    Hepatitis C screen  Never done    Annual Wellness Visit (AWV)  01/20/2023    Lipids  04/27/2023 (Originally 5/29/2021)    Depression Monitoring  04/27/2023    DTaP/Tdap/Td vaccine (3 - Td or Tdap) 01/19/2032    DEXA (modify frequency per FRAX score)  Completed    Flu vaccine  Completed    Shingles vaccine  Completed    Pneumococcal 65+ years Vaccine  Completed    COVID-19 Vaccine  Completed    Hepatitis A vaccine  Aged Out    Hib vaccine  Aged Out    Meningococcal (ACWY) vaccine  Aged Out       Subjective:      Review of Systems   Constitutional:  Negative for chills and fever. HENT:  Negative for congestion. Respiratory:  Positive for cough (chronic). Negative for shortness of breath. Gastrointestinal:  Negative for constipation, diarrhea (better), nausea and vomiting. Musculoskeletal:  Positive for back pain. Neurological:  Negative for dizziness, light-headedness and headaches. Psychiatric/Behavioral:  Negative for sleep disturbance. Objective:     /76   Pulse 94   Wt 240 lb 12.8 oz (109.2 kg)   SpO2 97%   BMI 42.66 kg/m²   Physical Exam  Vitals and nursing note reviewed. Constitutional:       General: She is not in acute distress. Appearance: She is well-developed. She is not ill-appearing. HENT:      Head: Normocephalic and atraumatic. Right Ear: External ear normal.      Left Ear: External ear normal.   Eyes:      General: No scleral icterus. Right eye: No discharge. Left eye: No discharge. Conjunctiva/sclera: Conjunctivae normal.   Neck:      Thyroid: No thyromegaly. Trachea: No tracheal deviation. Cardiovascular:      Rate and Rhythm: Normal rate and regular rhythm. Heart sounds: Normal heart sounds. Pulmonary:      Effort: Pulmonary effort is normal. No respiratory distress. Breath sounds: Normal breath sounds. No wheezing. Lymphadenopathy:      Cervical: No cervical adenopathy. Skin:     General: Skin is warm. Findings: No rash. Neurological:      Mental Status: She is alert and oriented to person, place, and time. Psychiatric:         Mood and Affect: Mood normal.         Behavior: Behavior normal.         Thought Content: Thought content normal.       Assessment/Plan:   1. Primary hypertension  Assessment & Plan:   Well-controlled, continue current medications  Orders:  -     TSH; Future  -     CBC with Auto Differential; Future  -     Comprehensive Metabolic Panel; Future  -     Magnesium; Future  -     Hemoglobin A1C; Future  -     Vitamin D 25 Hydroxy; Future  2.  Skin irritation  Comments:  zinc oxide and if not better consider yeast treatment. Orders:  -     TSH; Future  -     CBC with Auto Differential; Future  -     Comprehensive Metabolic Panel; Future  -     Magnesium; Future  -     Hemoglobin A1C; Future  -     Vitamin D 25 Hydroxy; Future  3. Acquired hypothyroidism  Assessment & Plan:   continue meds- check yearly  Orders:  -     TSH; Future  -     CBC with Auto Differential; Future  -     Comprehensive Metabolic Panel; Future  -     Magnesium; Future  -     Hemoglobin A1C; Future  -     Vitamin D 25 Hydroxy; Future  4. Mixed stress and urge urinary incontinence  Assessment & Plan:   pads causing skin irritation- zinc oxide. 5. Lumbar radiculopathy  Assessment & Plan:   continue same meds. , rest as needed   6. Essential hypertension  7. Vitamin D deficiency  -     Vitamin D 25 Hydroxy; Future  8. Hyperglycemia  -     Hemoglobin A1C; Future     Return for medication f/u, HTN f/u. Data Unavailable     Orders Placed This Encounter   Procedures    TSH     Standing Status:   Future     Standing Expiration Date:   12/22/2023    CBC with Auto Differential     Standing Status:   Future     Standing Expiration Date:   12/22/2023    Comprehensive Metabolic Panel     Standing Status:   Future     Standing Expiration Date:   12/22/2023    Magnesium     Standing Status:   Future     Standing Expiration Date:   12/21/2023    Hemoglobin A1C     Standing Status:   Future     Standing Expiration Date:   12/22/2023    Vitamin D 25 Hydroxy     Standing Status:   Future     Standing Expiration Date:   12/21/2023       No orders of the defined types were placed in this encounter. Patient given educational materials - see patient instructions. Discussed use, benefit, and side effects of prescribed medications. All patient questions answered. Pt voiced understanding. Reviewed health maintenance. Instructed to continue current medications, diet and exercise. Patient agreed with treatment plan. Follow up as directed. Electronically signed by Raphael Ford MD on 12/21/2022 at 8:44 AM

## 2023-01-20 ENCOUNTER — OFFICE VISIT (OUTPATIENT)
Dept: PRIMARY CARE CLINIC | Age: 78
End: 2023-01-20

## 2023-01-20 VITALS
HEART RATE: 87 BPM | SYSTOLIC BLOOD PRESSURE: 122 MMHG | BODY MASS INDEX: 42.8 KG/M2 | OXYGEN SATURATION: 96 % | DIASTOLIC BLOOD PRESSURE: 80 MMHG | WEIGHT: 241.6 LBS

## 2023-01-20 DIAGNOSIS — M25.571 ACUTE RIGHT ANKLE PAIN: Primary | ICD-10-CM

## 2023-01-20 SDOH — ECONOMIC STABILITY: FOOD INSECURITY: WITHIN THE PAST 12 MONTHS, YOU WORRIED THAT YOUR FOOD WOULD RUN OUT BEFORE YOU GOT MONEY TO BUY MORE.: NEVER TRUE

## 2023-01-20 SDOH — ECONOMIC STABILITY: FOOD INSECURITY: WITHIN THE PAST 12 MONTHS, THE FOOD YOU BOUGHT JUST DIDN'T LAST AND YOU DIDN'T HAVE MONEY TO GET MORE.: NEVER TRUE

## 2023-01-20 ASSESSMENT — PATIENT HEALTH QUESTIONNAIRE - PHQ9
SUM OF ALL RESPONSES TO PHQ QUESTIONS 1-9: 6
SUM OF ALL RESPONSES TO PHQ QUESTIONS 1-9: 6
2. FEELING DOWN, DEPRESSED OR HOPELESS: 2
4. FEELING TIRED OR HAVING LITTLE ENERGY: 2
5. POOR APPETITE OR OVEREATING: 0
10. IF YOU CHECKED OFF ANY PROBLEMS, HOW DIFFICULT HAVE THESE PROBLEMS MADE IT FOR YOU TO DO YOUR WORK, TAKE CARE OF THINGS AT HOME, OR GET ALONG WITH OTHER PEOPLE: 0
7. TROUBLE CONCENTRATING ON THINGS, SUCH AS READING THE NEWSPAPER OR WATCHING TELEVISION: 0
9. THOUGHTS THAT YOU WOULD BE BETTER OFF DEAD, OR OF HURTING YOURSELF: 0
8. MOVING OR SPEAKING SO SLOWLY THAT OTHER PEOPLE COULD HAVE NOTICED. OR THE OPPOSITE, BEING SO FIGETY OR RESTLESS THAT YOU HAVE BEEN MOVING AROUND A LOT MORE THAN USUAL: 0
SUM OF ALL RESPONSES TO PHQ QUESTIONS 1-9: 6
SUM OF ALL RESPONSES TO PHQ QUESTIONS 1-9: 6
1. LITTLE INTEREST OR PLEASURE IN DOING THINGS: 2
SUM OF ALL RESPONSES TO PHQ9 QUESTIONS 1 & 2: 4
3. TROUBLE FALLING OR STAYING ASLEEP: 0
6. FEELING BAD ABOUT YOURSELF - OR THAT YOU ARE A FAILURE OR HAVE LET YOURSELF OR YOUR FAMILY DOWN: 0

## 2023-01-20 ASSESSMENT — SOCIAL DETERMINANTS OF HEALTH (SDOH): HOW HARD IS IT FOR YOU TO PAY FOR THE VERY BASICS LIKE FOOD, HOUSING, MEDICAL CARE, AND HEATING?: NOT HARD AT ALL

## 2023-01-20 NOTE — PROGRESS NOTES
[address] [facility] CARE  [address] [address] [address]  Dept: [id number] Jigna Mcclellan is a 76 y.o. female Established patient, who presents today for her medical conditions/complaints as noted below. Chief Complaint   Patient presents with    Ankle Pain     Right ankle pain x 1 week. Pain with pressure. No known injury. HPI:     HPI- Ankle hurt when she got up from sitting. Does not bother her if just sitting, only if puts weight on it. About 10 days. Maybe a little better today. Has taken some advil and used biofreeze on it. Has been limping so back hurts more. No swelling. No color change.       Reviewed prior notes: None   Reviewed previous:   labs    LDL Cholesterol (mg/dL)   Date Value   09/18/2013 104 (H)   05/15/2012 86     LDL Calculated (mg/dL)   Date Value   05/29/2020 87   06/18/2018 69   02/20/2017 66       (goal LDL is <100)   AST (U/L)   Date Value   09/09/2022 21     ALT (U/L)   Date Value   09/09/2022 21     BUN (mg/dL)   Date Value   09/09/2022 21     Hemoglobin A1C (%)   Date Value   09/14/2022 6.0     TSH (uIU/mL)   Date Value   06/28/2021 0.68     BP Readings from Last 3 Encounters:   01/20/23 122/80   12/21/22 128/76   11/10/22 98/66          (goal 120/80)    Past Medical History:   Diagnosis Date    Acute cystitis with hematuria 8/23/2019    Acute idiopathic gout of foot 8/23/2019    Arthritis     Colon polyp     Glaucoma     right eye    Hyperlipidemia     Hypertension     Inflamed skin tag 5/15/2017    Spinal stenosis     Thyroid disease     hypothyroidism      Past Surgical History:   Procedure Laterality Date    BREAST SURGERY Left     open breast biopsy    CHOLECYSTECTOMY      COLONOSCOPY      COLONOSCOPY N/A 4/19/2018    COLONOSCOPY POLYPECTOMY SNARE HOT BIOPSY performed by Toña Luke MD at [address] Bilateral        Family History   Problem Relation Age of Onset    Diabetes Mother     COPD Father         ? maybe heart also    Cancer Maternal Grandmother         ?uterine? Other Other         cousin and maternal aunt with Alzhemimer's       Social History     Tobacco Use    Smoking status: Never    Smokeless tobacco: Never   Substance Use Topics    Alcohol use: Not Currently     Alcohol/week: 0.0 standard drinks     Comment: 10 years ago was last glass of wine      Current Outpatient Medications   Medication Sig Dispense Refill    allopurinol (ZYLOPRIM) 100 MG tablet TAKE 1 TABLET DAILY 90 tablet 3    levothyroxine (SYNTHROID) 112 MCG tablet TAKE 1 TABLET DAILY 90 tablet 3    potassium chloride (KLOR-CON) 10 MEQ extended release tablet TAKE 2 TABLETS DAILY 180 tablet 3    Multiple Vitamin (MULTI-VITAMIN DAILY PO) Take by mouth      nystatin (MYCOSTATIN) 304251 UNIT/GM powder Apply topically 4 times daily Apply 3 times daily. 60 g 0    simvastatin (ZOCOR) 20 MG tablet TAKE 1 TABLET DAILY AT BEDTIME 90 tablet 3    hydroCHLOROthiazide (MICROZIDE) 12.5 MG capsule TAKE 1 CAPSULE EVERY MORNING 90 capsule 3    losartan (COZAAR) 50 MG tablet TAKE 1 TABLET DAILY 90 tablet 3    Handicap Placard MISC by Does not apply route Exp 4-2-25 1 each 0    Calcium-Vitamin D 500-125 MG-UNIT TABS tablet      brimonidine (ALPHAGAN) 0.2 % ophthalmic solution 1 drop every 8 hours       No current facility-administered medications for this visit.      Allergies   Allergen Reactions    Ace Inhibitors      Adverse reaction: cough       Health Maintenance   Topic Date Due    Hepatitis C screen  Never done    Annual Wellness Visit (AWV)  01/20/2023    Lipids  04/27/2023 (Originally 5/29/2021)    Depression Monitoring  04/27/2023    DTaP/Tdap/Td vaccine (3 - Td or Tdap) 01/19/2032    DEXA (modify frequency per FRAX score)  Completed    Flu vaccine  Completed    Shingles vaccine  Completed    Pneumococcal 65+ years Vaccine  Completed    COVID-19 Vaccine  Completed    Hepatitis A vaccine  Aged Out    Hib vaccine  Aged Out    Meningococcal (ACWY) vaccine  Aged Out       Subjective:      Review of Systems   Constitutional:  Negative for chills and fever. Neurological:  Negative for numbness. Objective:     /80   Pulse 87   Wt 241 lb 9.6 oz (109.6 kg)   SpO2 96%   BMI 42.80 kg/m²   Physical Exam  Musculoskeletal:      Comments: Slight swelling of right lateral malleolus. No other point tenderness. Assessment/Plan:   1. Acute right ankle pain  Comments:  xray if not contining to get better. advil tid for the next few days. Orders:  -     XR ANKLE RIGHT (2 VIEWS); Future     No follow-ups on file. Data Unavailable     Orders Placed This Encounter   Procedures    XR ANKLE RIGHT (2 VIEWS)     Standing Status:   Future     Standing Expiration Date:   1/20/2024     No orders of the defined types were placed in this encounter. Patient given educational materials - see patient instructions. Discussed use, benefit, and side effects of prescribed medications. All patient questions answered. Pt voiced understanding. Reviewed health maintenance. Instructed to continue current medications, diet and exercise. Patient agreed with treatment plan. Follow up as directed.      Electronically signed by Burt Ayala MD on 1/20/2023 at 3:51 PM

## 2023-02-24 ENCOUNTER — OFFICE VISIT (OUTPATIENT)
Dept: PRIMARY CARE CLINIC | Age: 78
End: 2023-02-24

## 2023-02-24 VITALS
HEIGHT: 63 IN | WEIGHT: 239.8 LBS | SYSTOLIC BLOOD PRESSURE: 128 MMHG | DIASTOLIC BLOOD PRESSURE: 72 MMHG | OXYGEN SATURATION: 96 % | BODY MASS INDEX: 42.49 KG/M2 | HEART RATE: 103 BPM

## 2023-02-24 DIAGNOSIS — M46.1 BILATERAL SACROILIITIS (HCC): ICD-10-CM

## 2023-02-24 DIAGNOSIS — N18.30 STAGE 3 CHRONIC KIDNEY DISEASE, UNSPECIFIED WHETHER STAGE 3A OR 3B CKD (HCC): ICD-10-CM

## 2023-02-24 DIAGNOSIS — F33.41 RECURRENT MAJOR DEPRESSIVE DISORDER, IN PARTIAL REMISSION (HCC): ICD-10-CM

## 2023-02-24 DIAGNOSIS — Z00.00 MEDICARE ANNUAL WELLNESS VISIT, SUBSEQUENT: Primary | ICD-10-CM

## 2023-02-24 RX ORDER — BUPROPION HYDROCHLORIDE 150 MG/1
150 TABLET ORAL EVERY MORNING
Qty: 30 TABLET | Refills: 3 | Status: SHIPPED | OUTPATIENT
Start: 2023-02-24

## 2023-02-24 SDOH — ECONOMIC STABILITY: FOOD INSECURITY: WITHIN THE PAST 12 MONTHS, THE FOOD YOU BOUGHT JUST DIDN'T LAST AND YOU DIDN'T HAVE MONEY TO GET MORE.: NEVER TRUE

## 2023-02-24 SDOH — ECONOMIC STABILITY: HOUSING INSECURITY
IN THE LAST 12 MONTHS, WAS THERE A TIME WHEN YOU DID NOT HAVE A STEADY PLACE TO SLEEP OR SLEPT IN A SHELTER (INCLUDING NOW)?: NO

## 2023-02-24 SDOH — ECONOMIC STABILITY: INCOME INSECURITY: HOW HARD IS IT FOR YOU TO PAY FOR THE VERY BASICS LIKE FOOD, HOUSING, MEDICAL CARE, AND HEATING?: NOT HARD AT ALL

## 2023-02-24 SDOH — ECONOMIC STABILITY: FOOD INSECURITY: WITHIN THE PAST 12 MONTHS, YOU WORRIED THAT YOUR FOOD WOULD RUN OUT BEFORE YOU GOT MONEY TO BUY MORE.: NEVER TRUE

## 2023-02-24 ASSESSMENT — PATIENT HEALTH QUESTIONNAIRE - PHQ9
4. FEELING TIRED OR HAVING LITTLE ENERGY: 3
8. MOVING OR SPEAKING SO SLOWLY THAT OTHER PEOPLE COULD HAVE NOTICED. OR THE OPPOSITE, BEING SO FIGETY OR RESTLESS THAT YOU HAVE BEEN MOVING AROUND A LOT MORE THAN USUAL: 0
5. POOR APPETITE OR OVEREATING: 3
SUM OF ALL RESPONSES TO PHQ QUESTIONS 1-9: 10
7. TROUBLE CONCENTRATING ON THINGS, SUCH AS READING THE NEWSPAPER OR WATCHING TELEVISION: 0
10. IF YOU CHECKED OFF ANY PROBLEMS, HOW DIFFICULT HAVE THESE PROBLEMS MADE IT FOR YOU TO DO YOUR WORK, TAKE CARE OF THINGS AT HOME, OR GET ALONG WITH OTHER PEOPLE: 1
SUM OF ALL RESPONSES TO PHQ9 QUESTIONS 1 & 2: 3
1. LITTLE INTEREST OR PLEASURE IN DOING THINGS: 1
2. FEELING DOWN, DEPRESSED OR HOPELESS: 2
9. THOUGHTS THAT YOU WOULD BE BETTER OFF DEAD, OR OF HURTING YOURSELF: 0
3. TROUBLE FALLING OR STAYING ASLEEP: 0
6. FEELING BAD ABOUT YOURSELF - OR THAT YOU ARE A FAILURE OR HAVE LET YOURSELF OR YOUR FAMILY DOWN: 1

## 2023-02-24 ASSESSMENT — LIFESTYLE VARIABLES
HOW OFTEN DO YOU HAVE A DRINK CONTAINING ALCOHOL: NEVER
HOW MANY STANDARD DRINKS CONTAINING ALCOHOL DO YOU HAVE ON A TYPICAL DAY: PATIENT DOES NOT DRINK

## 2023-02-24 NOTE — PROGRESS NOTES
Medicare Annual Wellness Visit    Macie Mcmullen is here for Hypertension (Patient doesn't check B/P at home ) and Medicare AWV (Patient was given the words respect elephant and green to remember. She was given the time of 3:45 to draw. )    Assessment & Plan   Medicare annual wellness visit, subsequent  Bilateral sacroiliitis ([facility]  Assessment & Plan:   Borderline controlled, continue current medications    Recurrent major depressive disorder, in partial remission ([facility]  Assessment & Plan:   Borderline controlled, continue current medications- will discuss possible other meds as seems to be worse with current situation    Stage 3 chronic kidney disease, unspecified whether stage 3a or 3b CKD ([facility]  Assessment & Plan:    Monitoring closely. Continue same meds. Recommendations for Preventive Services Due: see orders and patient instructions/AVS.  Recommended screening schedule for the next 5-10 years is provided to the patient in written form: see Patient Instructions/AVS.     Return for Medicare Annual Wellness Visit in 1 year. Subjective   The following acute and/or chronic problems were also addressed today:  Depression acting up- lots of stress- anxiety- will try wellbutrin since lexapro really did not do anything last year. Joint pain- advil for her ankle and that helped. Still has some issues    Patient's complete Health Risk Assessment and screening values have been reviewed and are found in Flowsheets. The following problems were reviewed today and where indicated follow up appointments were made and/or referrals ordered. Positive Risk Factor Screenings with Interventions:    Fall Risk:  Do you feel unsteady or are you worried about falling? : (!) yes  2 or more falls in past year?: no  Fall with injury in past year?: no     Interventions: Working on doing more around the house to help with balance. Tries not to sit as long.        Depression:  PHQ-2 Score: 3  PHQ-9 Total Score: 10    Interpretation:   1-4 = minimal  5-9 = mild  10-14 = moderate  15-19 = moderately severe  20-27 = severe  Interventions:  New med started            Weight and Activity:  Physical Activity: Inactive    Days of Exercise per Week: 0 days    Minutes of Exercise per Session: 0 min     On average, how many days per week do you engage in moderate to strenuous exercise (like a brisk walk)?: 0 days  Have you lost any weight without trying in the past 3 months?: No  Body mass index: (!) 42.47    Inactivity Interventions:  Discussed ways to work on balance and be more active. Obesity Interventions:  As above                               Objective   Vitals:    02/24/23 0935   BP: 128/72   Pulse: (!) 103   SpO2: 96%   Weight: 239 lb 12.8 oz (108.8 kg)   Height: 5' 3\" (1.6 m)      Body mass index is 42.48 kg/m². Physical Exam  Vitals and nursing note reviewed. Constitutional:       General: She is not in acute distress. Appearance: She is well-developed. She is not ill-appearing. HENT:      Head: Normocephalic and atraumatic. Right Ear: Tympanic membrane and external ear normal.      Left Ear: Tympanic membrane and external ear normal.      Mouth/Throat:      Pharynx: Oropharynx is clear. No oropharyngeal exudate or posterior oropharyngeal erythema. Eyes:      General: No scleral icterus. Right eye: No discharge. Left eye: No discharge. Conjunctiva/sclera: Conjunctivae normal.   Neck:      Thyroid: No thyromegaly. Trachea: No tracheal deviation. Cardiovascular:      Rate and Rhythm: Normal rate and regular rhythm. Heart sounds: Normal heart sounds. Pulmonary:      Effort: Pulmonary effort is normal. No respiratory distress. Breath sounds: Normal breath sounds. No wheezing. Lymphadenopathy:      Cervical: No cervical adenopathy. Skin:     General: Skin is warm. Findings: No rash.    Neurological:      Mental Status: She is alert and oriented to person, place, and time. Psychiatric:         Mood and Affect: Mood normal.         Behavior: Behavior normal.         Thought Content: Thought content normal.           Allergies   Allergen Reactions    Ace Inhibitors      Adverse reaction: cough     Prior to Visit Medications    Medication Sig Taking? Authorizing Provider   buPROPion (WELLBUTRIN XL) 150 MG extended release tablet Take 1 tablet by mouth every morning Yes Pj Mixon MD   allopurinol (ZYLOPRIM) 100 MG tablet TAKE 1 TABLET DAILY Yes Pj Mixon MD   levothyroxine (SYNTHROID) 112 MCG tablet TAKE 1 TABLET DAILY Yes Pj Mixon MD   potassium chloride (KLOR-CON) 10 MEQ extended release tablet TAKE 2 TABLETS DAILY Yes Pj Mixon MD   Multiple Vitamin (MULTI-VITAMIN DAILY PO) Take by mouth Yes Historical Provider, MD   nystatin (MYCOSTATIN) 056327 UNIT/GM powder Apply topically 4 times daily Apply 3 times daily.  Yes Pj Mixon MD   simvastatin (ZOCOR) 20 MG tablet TAKE 1 TABLET DAILY AT BEDTIME Yes Pj Mixon MD   hydroCHLOROthiazide (MICROZIDE) 12.5 MG capsule TAKE 1 CAPSULE EVERY MORNING Yes Pj Mixon MD   losartan (COZAAR) 50 MG tablet TAKE 1 TABLET DAILY Yes Pj Mixon MD   Handicap Placard [address] [facility] by Does not apply route Exp 4-2-25 Yes Pj Mixon MD   Calcium-Vitamin D 500-125 MG-UNIT TABS tablet Yes Historical Provider, MD   brimonidine (ALPHAGAN) 0.2 % ophthalmic solution 1 drop every 8 hours Yes Historical Provider, MD [place] (Including outside providers/suppliers regularly involved in providing care):   Patient Care Team:  Pj Mixon MD as PCP - Jones Spatz, MD as PCP - Empaneled Provider     Reviewed and updated this visit:  Tobacco  Allergies  Meds  Med Hx  Surg Hx  Soc Hx  Fam Hx             Pj Mixon MD

## 2023-02-24 NOTE — ASSESSMENT & PLAN NOTE
Borderline controlled, continue current medications- will discuss possible other meds as seems to be worse with current situation

## 2023-02-26 DIAGNOSIS — I10 ESSENTIAL HYPERTENSION: Chronic | ICD-10-CM

## 2023-02-27 RX ORDER — HYDROCHLOROTHIAZIDE 12.5 MG/1
CAPSULE, GELATIN COATED ORAL
Qty: 90 CAPSULE | Refills: 3 | Status: SHIPPED | OUTPATIENT
Start: 2023-02-27

## 2023-02-27 RX ORDER — LOSARTAN POTASSIUM 50 MG/1
TABLET ORAL
Qty: 90 TABLET | Refills: 3 | Status: SHIPPED | OUTPATIENT
Start: 2023-02-27

## 2023-04-10 PROBLEM — F41.9 ANXIETY: Status: ACTIVE | Noted: 2023-04-10

## 2023-05-08 RX ORDER — SIMVASTATIN 20 MG
TABLET ORAL
Qty: 90 TABLET | Refills: 3 | Status: SHIPPED | OUTPATIENT
Start: 2023-05-08

## 2023-05-08 NOTE — TELEPHONE ENCOUNTER
LAST VISIT:   4/10/2023     Future Appointments   Date Time Provider Patrick Prince   5/24/2023 10:00 AM MD KEEGAN Childress

## 2023-05-24 ENCOUNTER — OFFICE VISIT (OUTPATIENT)
Dept: PRIMARY CARE CLINIC | Age: 78
End: 2023-05-24
Payer: MEDICARE

## 2023-05-24 VITALS
DIASTOLIC BLOOD PRESSURE: 60 MMHG | BODY MASS INDEX: 42.09 KG/M2 | WEIGHT: 237.6 LBS | OXYGEN SATURATION: 96 % | SYSTOLIC BLOOD PRESSURE: 110 MMHG | HEART RATE: 119 BPM

## 2023-05-24 DIAGNOSIS — B37.2 YEAST DERMATITIS: ICD-10-CM

## 2023-05-24 DIAGNOSIS — R42 DIZZINESS: ICD-10-CM

## 2023-05-24 DIAGNOSIS — F41.9 ANXIETY: ICD-10-CM

## 2023-05-24 DIAGNOSIS — E66.01 OBESITY, CLASS III, BMI 40-49.9 (MORBID OBESITY) (HCC): ICD-10-CM

## 2023-05-24 DIAGNOSIS — B35.1 FUNGAL INFECTION OF TOENAIL: ICD-10-CM

## 2023-05-24 PROCEDURE — 99214 OFFICE O/P EST MOD 30 MIN: CPT | Performed by: FAMILY MEDICINE

## 2023-05-24 PROCEDURE — 1123F ACP DISCUSS/DSCN MKR DOCD: CPT | Performed by: FAMILY MEDICINE

## 2023-05-24 PROCEDURE — 3078F DIAST BP <80 MM HG: CPT | Performed by: FAMILY MEDICINE

## 2023-05-24 PROCEDURE — 3074F SYST BP LT 130 MM HG: CPT | Performed by: FAMILY MEDICINE

## 2023-05-24 RX ORDER — FLUCONAZOLE 150 MG/1
150 TABLET ORAL WEEKLY
Qty: 12 TABLET | Refills: 1 | Status: SHIPPED | OUTPATIENT
Start: 2023-05-24

## 2023-05-24 RX ORDER — MECLIZINE HCL 12.5 MG/1
12.5 TABLET ORAL 3 TIMES DAILY PRN
Qty: 15 TABLET | Refills: 0 | Status: SHIPPED | OUTPATIENT
Start: 2023-05-24 | End: 2023-06-03

## 2023-05-24 ASSESSMENT — ENCOUNTER SYMPTOMS
ABDOMINAL PAIN: 0
SHORTNESS OF BREATH: 1
RHINORRHEA: 0
NAUSEA: 0
WHEEZING: 0
SORE THROAT: 0
COUGH: 0
EYE DISCHARGE: 0
BACK PAIN: 1
DIARRHEA: 0
VOMITING: 0
EYE REDNESS: 0

## 2023-05-24 NOTE — PROGRESS NOTES
[facility] PRIMARY CARE  [address]  [facility] [address]  Dept: [id number] Jigna Mcclellan is a 76 y.o. female Established patient, who presents today for her medical conditions/complaints as noted below. Chief Complaint   Patient presents with    Anxiety     Pt is here for a x6 week f/u. HPI:   Thinks wellbutrin is helping a little bit. Staying calmer. Sleeping okay sometimes, other times is awake. Pain is about the same. Took ibu every day but not really helpful so uses biofreeze and sits down. Takes ibu as needed.       Reviewed prior notes: None   Reviewed previous:  Labs    LDL Cholesterol (mg/dL)   Date Value   09/18/2013 104 (H)   05/15/2012 86     LDL Calculated (mg/dL)   Date Value   05/29/2020 87   06/18/2018 69   02/20/2017 66       (goal LDL is <100)   AST (U/L)   Date Value   02/15/2023 23     ALT (U/L)   Date Value   02/15/2023 23     BUN (mg/dL)   Date Value   02/15/2023 21     Hemoglobin A1C (%)   Date Value   02/15/2023 6.3 (H)     TSH (uIu/mL)   Date Value   02/15/2023 2.500     BP Readings from Last 3 Encounters:   05/24/23 110/60   04/10/23 128/82   02/24/23 128/72          (goal 120/80)    Past Medical History:   Diagnosis Date    Acute cystitis with hematuria 8/23/2019    Acute idiopathic gout of foot 8/23/2019    Arthritis     Colon polyp     Glaucoma     right eye    Hyperlipidemia     Hypertension     Inflamed skin tag 5/15/2017    Spinal stenosis     Thyroid disease     hypothyroidism      Past Surgical History:   Procedure Laterality Date    BREAST SURGERY Left     open breast biopsy    CHOLECYSTECTOMY      COLONOSCOPY      COLONOSCOPY N/A 4/19/2018    COLONOSCOPY POLYPECTOMY SNARE HOT BIOPSY [facility]        Family History   Problem Relation Age of Onset    Diabetes Mother     COPD Father         ? maybe heart also

## 2023-09-25 ENCOUNTER — OFFICE VISIT (OUTPATIENT)
Dept: PRIMARY CARE CLINIC | Age: 78
End: 2023-09-25
Payer: MEDICARE

## 2023-09-25 VITALS
BODY MASS INDEX: 42.81 KG/M2 | OXYGEN SATURATION: 98 % | DIASTOLIC BLOOD PRESSURE: 72 MMHG | WEIGHT: 241.6 LBS | HEIGHT: 63 IN | SYSTOLIC BLOOD PRESSURE: 126 MMHG | HEART RATE: 96 BPM

## 2023-09-25 DIAGNOSIS — I10 PRIMARY HYPERTENSION: Primary | Chronic | ICD-10-CM

## 2023-09-25 DIAGNOSIS — M54.16 LUMBAR RADICULOPATHY: Chronic | ICD-10-CM

## 2023-09-25 DIAGNOSIS — R73.9 HYPERGLYCEMIA: ICD-10-CM

## 2023-09-25 DIAGNOSIS — E03.9 ACQUIRED HYPOTHYROIDISM: Chronic | ICD-10-CM

## 2023-09-25 DIAGNOSIS — R53.83 OTHER FATIGUE: ICD-10-CM

## 2023-09-25 PROCEDURE — G0008 ADMIN INFLUENZA VIRUS VAC: HCPCS | Performed by: FAMILY MEDICINE

## 2023-09-25 PROCEDURE — 3078F DIAST BP <80 MM HG: CPT | Performed by: FAMILY MEDICINE

## 2023-09-25 PROCEDURE — 99213 OFFICE O/P EST LOW 20 MIN: CPT | Performed by: FAMILY MEDICINE

## 2023-09-25 PROCEDURE — 3074F SYST BP LT 130 MM HG: CPT | Performed by: FAMILY MEDICINE

## 2023-09-25 PROCEDURE — 90694 VACC AIIV4 NO PRSRV 0.5ML IM: CPT | Performed by: FAMILY MEDICINE

## 2023-09-25 PROCEDURE — 1123F ACP DISCUSS/DSCN MKR DOCD: CPT | Performed by: FAMILY MEDICINE

## 2023-09-25 ASSESSMENT — ENCOUNTER SYMPTOMS
EYE DISCHARGE: 0
DIARRHEA: 0
WHEEZING: 0
SHORTNESS OF BREATH: 0
VOMITING: 0
NAUSEA: 0
SORE THROAT: 0
COUGH: 0
RHINORRHEA: 0
ABDOMINAL PAIN: 0
EYE REDNESS: 0

## 2023-09-25 NOTE — PROGRESS NOTES
[address] PRIMARY CARE  [address] [facility] [address]  Dept: 102 Miguel Ángel Hernandez is a 76 y.o. female Established patient, who presents today for her medical conditions/complaints as noted below. Chief Complaint   Patient presents with    Hypertension    Flu Vaccine       HPI:   Pt feeling okay. Does have a few complaints. Walking is an issue due to pain/ hurting. Would like to go back to therapy. Aquatic made her feel good before. Also having issues with walking too far. Gets pain and also SOB. Sleeps a lot.   Usually 9-10 hours and then sits all day so falls asleep     Reviewed prior notes: None   Reviewed previous:  Labs    LDL Cholesterol (mg/dL)   Date Value   09/18/2013 104 (H)   05/15/2012 86     LDL Calculated (mg/dL)   Date Value   05/29/2020 87   06/18/2018 69   02/20/2017 66       (goal LDL is <100)   AST (U/L)   Date Value   02/15/2023 23     ALT (U/L)   Date Value   02/15/2023 23     BUN (mg/dL)   Date Value   02/15/2023 21     Hemoglobin A1C (%)   Date Value   02/15/2023 6.3 (H)     TSH (uIu/mL)   Date Value   02/15/2023 2.500     BP Readings from Last 3 Encounters:   09/25/23 126/72   05/24/23 110/60   04/10/23 128/82          (goal 120/80)    Past Medical History:   Diagnosis Date    Acute cystitis with hematuria 8/23/2019    Acute idiopathic gout of foot 8/23/2019    Arthritis     Colon polyp     Glaucoma     right eye    Hyperlipidemia     Hypertension     Inflamed skin tag 5/15/2017    Spinal stenosis     Thyroid disease     hypothyroidism      Past Surgical History:   Procedure Laterality Date    BREAST SURGERY Left     open breast biopsy    CHOLECYSTECTOMY      COLONOSCOPY      COLONOSCOPY N/A 4/19/2018    COLONOSCOPY POLYPECTOMY SNARE HOT BIOPSY performed by Christiano Jaramillo MD at [facility] Bilateral        Family History   Problem Relation Age of Onset    Diabetes

## 2023-10-04 RX ORDER — POTASSIUM CHLORIDE 750 MG/1
TABLET, FILM COATED, EXTENDED RELEASE ORAL
Qty: 180 TABLET | Refills: 3 | Status: SHIPPED | OUTPATIENT
Start: 2023-10-04

## 2023-10-05 LAB
ABSOLUTE BASO #: 0.07 K/UL (ref 0–0.2)
ABSOLUTE EOS #: 0.33 K/UL (ref 0–0.5)
ABSOLUTE LYMPH #: 1.8 K/UL (ref 1–4)
ABSOLUTE MONO #: 0.78 K/UL (ref 0.2–1)
ABSOLUTE NEUT #: 3.42 K/UL (ref 1.5–7.5)
ALBUMIN SERPL-MCNC: 4.4 G/DL (ref 3.5–5.2)
ALK PHOSPHATASE: 78 U/L (ref 40–142)
ALT SERPL-CCNC: 31 U/L (ref 5–40)
ANION GAP SERPL CALCULATED.3IONS-SCNC: 7 MEQ/L (ref 7–16)
AST SERPL-CCNC: 25 U/L (ref 9–40)
AVERAGE GLUCOSE: 137 MG/DL
BASOPHILS RELATIVE PERCENT: 1.1 %
BILIRUB SERPL-MCNC: 0.5 MG/DL
BUN BLDV-MCNC: 21 MG/DL (ref 8–23)
CALCIUM SERPL-MCNC: 11.1 MG/DL (ref 8.5–10.5)
CHLORIDE BLD-SCNC: 106 MEQ/L (ref 95–107)
CO2: 28 MEQ/L (ref 19–31)
CREAT SERPL-MCNC: 1.55 MG/DL (ref 0.6–1.3)
EGFR IF NONAFRICAN AMERICAN: 34 ML/MIN/1.73
EOSINOPHILS RELATIVE PERCENT: 5.1 %
GLUCOSE: 112 MG/DL (ref 70–99)
HBA1C MFR BLD: 6.4 % (ref 4.2–5.6)
HCT VFR BLD CALC: 41.3 % (ref 34–45)
HEMOGLOBIN: 13.7 G/DL (ref 11.5–15.5)
LYMPHOCYTE %: 28 %
MCH RBC QN AUTO: 29.6 PG (ref 25–33)
MCHC RBC AUTO-ENTMCNC: 33.2 G/DL (ref 31–36)
MCV RBC AUTO: 89.2 FL (ref 80–99)
MONOCYTES # BLD: 12.1 %
NEUTROPHILS RELATIVE PERCENT: 53.4 %
PDW BLD-RTO: 13.4 % (ref 11.5–15)
PLATELETS: 271 K/UL (ref 130–400)
PMV BLD AUTO: 11.5 FL (ref 9.3–13)
POTASSIUM SERPL-SCNC: 4.4 MEQ/L (ref 3.5–5.4)
RBC: 4.63 M/UL (ref 3.8–5.4)
SODIUM BLD-SCNC: 141 MEQ/L (ref 133–146)
TOTAL PROTEIN: 6.4 G/DL (ref 6.1–8.3)
TSH SERPL DL<=0.05 MIU/L-ACNC: 5.93 UIU/ML (ref 0.4–4.1)
WBC: 6.4 K/UL (ref 3.5–11)

## 2023-10-11 ENCOUNTER — HOSPITAL ENCOUNTER (OUTPATIENT)
Dept: PHYSICAL THERAPY | Age: 78
Setting detail: THERAPIES SERIES
Discharge: HOME | End: 2023-10-11
Payer: MEDICARE

## 2023-10-11 PROCEDURE — 97110 THERAPEUTIC EXERCISES: CPT

## 2023-10-11 PROCEDURE — 97161 PT EVAL LOW COMPLEX 20 MIN: CPT

## 2023-10-13 ENCOUNTER — OFFICE VISIT (OUTPATIENT)
Dept: PRIMARY CARE CLINIC | Age: 78
End: 2023-10-13

## 2023-10-13 VITALS
DIASTOLIC BLOOD PRESSURE: 72 MMHG | HEART RATE: 70 BPM | BODY MASS INDEX: 42.84 KG/M2 | HEIGHT: 63 IN | WEIGHT: 241.8 LBS | OXYGEN SATURATION: 98 % | SYSTOLIC BLOOD PRESSURE: 120 MMHG

## 2023-10-13 DIAGNOSIS — N18.32 STAGE 3B CHRONIC KIDNEY DISEASE (HCC): ICD-10-CM

## 2023-10-13 DIAGNOSIS — E83.52 HYPERCALCEMIA: ICD-10-CM

## 2023-10-13 DIAGNOSIS — E03.9 ACQUIRED HYPOTHYROIDISM: Primary | Chronic | ICD-10-CM

## 2023-10-13 RX ORDER — MELATONIN
1000 DAILY
Qty: 30 TABLET | Refills: 5 | COMMUNITY
Start: 2023-10-13

## 2023-10-13 RX ORDER — LEVOTHYROXINE SODIUM 0.12 MG/1
125 TABLET ORAL DAILY
Qty: 90 TABLET | Refills: 3 | Status: SHIPPED | OUTPATIENT
Start: 2023-10-13

## 2023-10-13 ASSESSMENT — ENCOUNTER SYMPTOMS
COUGH: 0
SHORTNESS OF BREATH: 0

## 2023-10-13 NOTE — PROGRESS NOTES
[address] PRIMARY CARE  [address]  [facility] [address]  Dept: 102 Miguel Ángel Hernandez is a 76 y.o. female Established patient, who presents today for her medical conditions/complaints as noted below. Chief Complaint   Patient presents with    Discuss Labs       HPI:   Pt doing okay- here to review labs. Reviewed prior notes: None   Reviewed previous:  Labs    LDL Cholesterol (mg/dL)   Date Value   09/18/2013 104 (H)   05/15/2012 86     LDL Calculated (mg/dL)   Date Value   05/29/2020 87   06/18/2018 69   02/20/2017 66       (goal LDL is <100)   AST (U/L)   Date Value   10/04/2023 25     ALT (U/L)   Date Value   10/04/2023 31     BUN (mg/dL)   Date Value   10/04/2023 21     Hemoglobin A1C (%)   Date Value   10/04/2023 6.4 (H)     TSH (uIu/mL)   Date Value   10/04/2023 5.930 (H)     BP Readings from Last 3 Encounters:   10/13/23 120/72   09/25/23 126/72   05/24/23 110/60          (goal 120/80)    Past Medical History:   Diagnosis Date    Acute cystitis with hematuria 8/23/2019    Acute idiopathic gout of foot 8/23/2019    Arthritis     Colon polyp     Glaucoma     right eye    Hyperlipidemia     Hypertension     Inflamed skin tag 5/15/2017    Renal insufficiency 2/2/2016    Spinal stenosis     Thyroid disease     hypothyroidism      Past Surgical History:   Procedure Laterality Date    BREAST SURGERY Left     open breast biopsy    CHOLECYSTECTOMY      COLONOSCOPY      COLONOSCOPY N/A 4/19/2018    COLONOSCOPY POLYPECTOMY SNARE HOT BIOPSY performed by Rossy Garcia MD at [address] ARTHROPLASTY Bilateral        Family History   Problem Relation Age of Onset    Diabetes Mother     COPD Father         ? maybe heart also    Cancer Maternal Grandmother         ?uterine?     Other Other         cousin and maternal aunt with Alzhemimer's       Social History     Tobacco Use    Smoking status: Never    Smokeless

## 2023-10-16 ENCOUNTER — HOSPITAL ENCOUNTER (OUTPATIENT)
Dept: PHYSICAL THERAPY | Age: 78
Setting detail: THERAPIES SERIES
Discharge: HOME | End: 2023-10-16
Payer: MEDICARE

## 2023-10-16 PROCEDURE — 97113 AQUATIC THERAPY/EXERCISES: CPT

## 2023-10-16 NOTE — FLOWSHEET NOTE
[x] [facility]) [facility] & Therapy  [address]  [place]: [phone]   F: [phone]    Physical Therapy Daily Treatment Note      Date:  10/16/2023  Patient Name:  Tamanna Marie    :  1945  MRN: [id number]  Physician: Sheron Chambers MD                                 Insurance: [facility] ([facility] CAP, [facility] [place])  Medical Diagnosis:   M54.16 (ICD-10-CM) - Lumbar radiculopathy   R53.83 (ICD-10-CM) - Other fatigue                             Rehab Codes: M54.59 Low back pain  Onset Date:                                  Next 's appt: 10/13/23    Visit# / total visits: 2/12  Cancels/No Shows: 0/0    Subjective:  Reports pain worsens when walking- tolerates minimal time on her feet. Pain can spike as high as 7/10 but if she sits pain resolves. States pain is minimal today but she has not been on her feet much. States pain very rarely goes into legs; generally left LB is always sore and can increased across lower back and up spine    Pain:  [x] Yes  [] No Location: Lower back L>R  Pain Rating: (0-10 scale) 0/10 upon arrival  Pain altered Tx:  [x] No  [] Yes  Action:    Comments: Initiated aquatic therapy with emphasis on core stability and improved postural awareness. Patient educated on the benefits of aquatics and encouraged to avoid increasing pain    Objective:    Precautions: None Note  Exercises:    [address] Exercise Log  Aquatic, Hip & DLS Program- Phase 1    Date of Eval:     10/11/23                           Primary PT: Teri Salazar, PT    Things to Focus On (goals): Gross LE strengthening    [x] Medicare - track cap      Date 10/16/23       Visit # 2/12       Walk F/L/R 2 Laps       Marching 10x       Squats 10x5\"       Step-Ups F/L        Step Down F/L        Heel-toe raises 10x       SLR F/L/R 10x       Hip/Knee Flex/Ext        F/L Lunges                Kickboard Ex. Add      Iso Abd.

## 2023-10-17 RX ORDER — BUPROPION HYDROCHLORIDE 150 MG/1
150 TABLET ORAL EVERY MORNING
Qty: 90 TABLET | Refills: 3 | Status: SHIPPED | OUTPATIENT
Start: 2023-10-17

## 2023-10-17 NOTE — TELEPHONE ENCOUNTER
LAST VISIT:   10/13/2023     Future Appointments   Date Time Provider [address]   10/18/2023  1:00 PM Kelly Murphy, PTA [facility] [place]   10/23/2023 10:00 AM Jose Diehl, PTA [facility] [place]   10/25/2023 10:00 AM Kaleb Wolfe, PTA [facility] [place]   10/30/2023  9:30 AM Kaiden Abreu, PT [facility] [place]   10/30/2023 10:00 AM Neris Roman, PTA [facility] [place]   1/3/2024  1:00 PM Lu Morrissey MD [id number]            [facility]

## 2023-10-18 ENCOUNTER — HOSPITAL ENCOUNTER (OUTPATIENT)
Dept: PHYSICAL THERAPY | Age: 78
Setting detail: THERAPIES SERIES
Discharge: HOME | End: 2023-10-18
Payer: MEDICARE

## 2023-10-18 PROCEDURE — 97113 AQUATIC THERAPY/EXERCISES: CPT

## 2023-10-18 NOTE — FLOWSHEET NOTE
[x] [facility] ([facility]) [facility] & Therapy  [address]  [place]: [phone]   F: [phone]    Physical Therapy Daily Treatment Note      Date:  10/18/2023  Patient Name:  Suki Castro    :  1945  MRN: [id number]  Physician: Geneva Alvarez MD                                 Insurance: [facility] ([address])  Medical Diagnosis:   M54.16 (ICD-10-CM) - Lumbar radiculopathy   R53.83 (ICD-10-CM) - Other fatigue                             Rehab Codes: M54.59 Low back pain  Onset Date:                                  Next 's appt: 10/13/23    Visit# / total visits: 3  Cancels/No Shows: 0/0    Subjective:  Noted soreness in her lower back and hips after last visit but denies increased pain    Pain:  [x] Yes  [] No Location: Lower back L>R  Pain Rating: (0-10 scale) 0/10 upon arrival  Pain altered Tx:  [x] No  [] Yes  Action:    Comments: Reviewed core stability and improved postural awareness. Objective:    Precautions: None Note  Exercises:    [address] [employer] Exercise Log  Aquatic, Hip & DLS Program- Phase 1    Date of Eval:     10/11/23                           Primary PT: Raffy Lan, PT    Things to Focus On (goals): Gross LE strengthening    [x] Medicare - track cap      Date 10/16/23 10/18/23      Visit # 2/12 3      Walk F/L/R 2 Laps 2 Laps      Marching 10x 2 Laps      Squats 10x5\" 15x5\"      Step-Ups F/L   Add     Step Down F/L        Heel-toe raises 10x 15x      SLR F/L/R 10x 15x      Hip/Knee Flex/Ext  15x      F/L Lunges                Kickboard Ex.   Med      Iso Abd.  10x5\"      Push-pull  10x      Paddling                UE Format:        Horiz Abd/Add        IR/ER (wipers)        Alt Flex/Ext        Alt Press Down        Abd/Add                Deep Water: 1 Noodle 1 Noodle      Hang 3' 4'      Cycling 1' 2'      Jacks  1'      X-Country                Balance        SLS

## 2023-10-23 ENCOUNTER — HOSPITAL ENCOUNTER (OUTPATIENT)
Dept: PHYSICAL THERAPY | Age: 78
Setting detail: THERAPIES SERIES
Discharge: HOME | End: 2023-10-23
Payer: MEDICARE

## 2023-10-23 PROCEDURE — 97113 AQUATIC THERAPY/EXERCISES: CPT

## 2023-10-23 NOTE — FLOWSHEET NOTE
[x] [facility]) - [facility] & Therapy  [address]  [place]: [phone]   F: [phone]    Physical Therapy Daily Treatment Note    Date:  10/23/2023  Patient Name:  Elodia Mondragon    :  1945  MRN: [id number]  Physician: Arun Dow MD                                 Insurance: [facility] ([facility])  Medical Diagnosis:   M54.16 (ICD-10-CM) - Lumbar radiculopathy   R53.83 (ICD-10-CM) - Other fatigue                             Rehab Codes: M54.59 Low back pain  Onset Date:                                  Next 's appt: 10/13/23    Visit# / total visits:   Cancels/No Shows: 0/0    Subjective:  PT reports lumbar back has been feeling good over the weekend. Denies any increased pain after last visit. States slept wrong over the weekend and neck was hurting but better now. Pain:  [] Yes  [x] No Location: Lower back L>R  Pain Rating: (0-10 scale) 0/10 upon arrival  Pain altered Tx:  [x] No  [] Yes  Action:    Comments: Reviewed core stability and improved postural awareness. Objective:    Precautions: None Note  Exercises:    [address] Exercise Log  Aquatic, Hip & DLS Program- Phase 1    Date of Eval:     10/11/23                           Primary PT: Kelvin Roberts, PT    Things to Focus On (goals): Gross LE strengthening    [x] Medicare - [facility] cap      Date 10/16/23 10/18/23 10/23/23     Visit # /12 3/12 4/12     Walk F/L/R 2 Laps 2 Laps 2 Laps     Marching 10x 2 Laps 2 Laps     Squats 10x5\" 15x5\" Low box 15x5\"     Step-Ups F/L   Low 10x     Step Down F/L        Heel-toe raises 10x 15x 15x     SLR F/L/R 10x 15x 15x     Hip/Knee Flex/Ext  15x 15x     F/L Lunges                Kickboard Ex.   Med Med     Iso Abd.  10x5\" 10x5\"     Push-pull  10x 15x     Paddling                UE Format:        Horiz Abd/Add        IR/ER (wipers)        Alt Flex/Ext        Alt Press Down        Abd/Add

## 2023-10-25 ENCOUNTER — HOSPITAL ENCOUNTER (OUTPATIENT)
Dept: PHYSICAL THERAPY | Age: 78
Setting detail: THERAPIES SERIES
Discharge: HOME | End: 2023-10-25
Payer: MEDICARE

## 2023-10-25 PROCEDURE — 97113 AQUATIC THERAPY/EXERCISES: CPT

## 2023-10-25 NOTE — FLOWSHEET NOTE
[x] [facility]) - [facility] & Therapy  [address]  [place]: [phone]   F: [phone]    Physical Therapy Daily Treatment Note    Date:  10/25/2023  Patient Name:  Susan Walter    :  1945  MRN: [id number]  Physician: Maryanne Simmonds, MD                                 Insurance: [facility] ([address])  Medical Diagnosis:   M54.16 (ICD-10-CM) - Lumbar radiculopathy   R53.83 (ICD-10-CM) - Other fatigue                             Rehab Codes: M54.59 Low back pain  Onset Date:                                  Next 's appt: 10/13/23    Visit# / total visits:   Cancels/No Shows: 0/0    Subjective:  Pt reports no pain in back upon arrival. States back felt good for the remainder of the day and into the next day after last therapy session. Notes pain increased with increased bending and lifting activities. Pain:  [] Yes  [x] No Location: Lower back L>R  Pain Rating: (0-10 scale) 0/10 upon arrival  Pain altered Tx:  [x] No  [] Yes  Action:    Comments: Reviewed core stability and improved postural awareness. Objective:    Precautions: None Note  Exercises:    [address] [employer] Exercise Log  Aquatic, Hip & DLS Program- Phase 1    Date of Eval:     10/11/23                           Primary PT: Yash Andres PT    Things to Focus On (goals): Gross LE strengthening    [x] Medicare - [facility] cap      Date 10/16/23 10/18/23 10/23/23 10/25/23    Visit # /12 3/12 4/12 5/12    Walk F/L/R 2 Laps 2 Laps 2 Laps 2 Laps    Marching 10x 2 Laps 2 Laps 2 Laps        All LE exs on Low box    Squats 10x5\" 15x5\" Low box 15x5\" 15x5\"    Step-Ups F/L   Low 10x Low 10x    Step Down F/L        Heel-toe raises 10x 15x 15x 15x    SLR F/L/R 10x 15x 15x 15x    Hip/Knee Flex/Ext  15x 15x 15x    F/L Lunges                Kickboard Ex.   Med Med Med    Iso Abd.  10x5\" 10x5\" 10x5\"    Push-pull  10x 15x 15x    Paddling    10x

## 2023-10-30 ENCOUNTER — HOSPITAL ENCOUNTER (OUTPATIENT)
Dept: PHYSICAL THERAPY | Age: 78
Setting detail: THERAPIES SERIES
Discharge: HOME | End: 2023-10-30
Payer: MEDICARE

## 2023-10-30 PROCEDURE — 97110 THERAPEUTIC EXERCISES: CPT

## 2023-10-30 PROCEDURE — 97113 AQUATIC THERAPY/EXERCISES: CPT

## 2023-10-30 NOTE — FLOWSHEET NOTE
Med Med Med Med   Iso Abd.  10x5\" 10x5\" 10x5\" 10x5\"   Push-pull  10x 15x 15x 15x   Paddling    10x 15x           UE Format:        Horiz Abd/Add     10x   IR/ER (wipers)        Alt Flex/Ext     10x   Alt Press Down        Abd/Add     10x           Deep Water: 1 Noodle 1 Noodle 1 Noodle 1 Noodle 1 Noodle   Hang 3' 4' 5' 5' 5'   Cycling 1' 2' 2' 2' 2'   Jacks  1' 2' 2' 2'   X-Country   1' 2' 2'           Balance        SLS                Stretches        Achllies   2x20\" 2x20\" 2x20\"   Hamstring 2x20\" 2x20\" 2x20\" 2x20\" 2x20\"           Breast Stroke 2 Laps 2 Laps 2 Laps 2 Laps 2 Laps   Pain Rating 0 0 0 0 0     Specific Instructions for next treatment: Progress to tall step for step ups next visit. Assessment: [x] Progressing toward goals. Added UE format for trunk and core stabilization without increased pain. Cueing to decrease height of SLR if continues to \"pull\" in lumbar back. Denies any pain with exercises performed. Increased reps of kick board paddling and step ups for core stabilization and LE strengthening respectively. Denies any pain after exiting pool. [] No change. [] Other:    [x] Patient will benefit from skilled physical therapy services to decrease pain, improve ROM, improve LE strength/endurance, improve activity levels so that patient can remain independent at home and have greater ease with activities such as walking and cooking meals. STG: (to be met in 6 treatments)  ? Pain:2-3/10 low back pain or less to improve ability with standing endurance for cooking and cleaning  ? ROM:Lumbar rotation to 90% or better bilaterally to improve ability to perform standing activity including mopping,pushing and pulling. ? Strength:L hip grossly to 4+/5 or better to improve ability with walking further distances at home and in the community  ?  Function: Mod Oswestry to 4% impairment or less to improve ability with ADLs and IADLs  Independent with Home Exercise Programs     LTG: (to be met

## 2023-11-02 ENCOUNTER — HOSPITAL ENCOUNTER (OUTPATIENT)
Dept: PHYSICAL THERAPY | Age: 78
Setting detail: THERAPIES SERIES
Discharge: HOME | End: 2023-11-02
Payer: MEDICARE

## 2023-11-02 PROCEDURE — 97113 AQUATIC THERAPY/EXERCISES: CPT

## 2023-11-02 NOTE — FLOWSHEET NOTE
[x] [facility]) [facility] & Therapy  [address]  [place]: [phone]   F: [phone]    Physical Therapy Daily Treatment Note    Date:  2023  Patient Name:  Roberto Bobby    :  1945  MRN: [id number]  Physician: Catarina Kinsey MD                                 Insurance: [facility] ([facility] CAP, \[facility] [place] [facility]\"" ORTHOPEDIC [place])  Medical Diagnosis:   M54.16 (ICD-10-CM) - Lumbar radiculopathy   R53.83 (ICD-10-CM) - Other fatigue                             Rehab Codes: M54.59 Low back pain  Onset Date:                                  Next 's appt: 10/13/23    Visit# / total visits:   Cancels/No Shows: 0/0    Subjective:  Pt reports no issues after last therapy session. Notes pain usually worse on left vs right. Reports still only able to stand for 15 minutes before needing to rest but recovery time is shorter since starting therapy. Pain:  [] Yes  [x] No Location: Lower back L>R  Pain Rating: (0-10 scale) 0/10 upon arrival  Pain altered Tx:  [x] No  [] Yes  Action:    Comments: Reviewed core stability and improved postural awareness. Objective:    Precautions: None Note  Exercises:    [address] [place] Exercise Log  Aquatic, Hip & DLS Program- Phase 1    Date of Eval:     10/11/23                           Primary PT: Heri Osborne PT    Things to Focus On (goals): Gross LE strengthening    [x] Medicare - track cap      Date 10/23/23 10/25/23 10/30/23 11/2/23   Visit # /12    Walk F/L/R 2 Laps 2 Laps 2 Laps 2 Laps    Marching 2 Laps 2 Laps 2 Laps 2 Laps      All LE exs on Low box All LE exs on Low box All LE exs on Low box   Squats Low box 15x5\" 15x5\" 15x5\" 15x5\"   Step-Ups F/L Low 10x Low 10x Low 15x Tall 15x   Step Down F/L       Heel-toe raises 15x 15x 15x 15x   SLR F/L/R 15x 15x 15x 15x   Hip/Knee Flex/Ext 15x 15x 15x 15x   F/L Lunges              Kickboard Ex.  Med Med Med Med   Iso Abd. 10x5\" 10x5\"

## 2023-11-06 ENCOUNTER — HOSPITAL ENCOUNTER (OUTPATIENT)
Dept: PHYSICAL THERAPY | Age: 78
Setting detail: THERAPIES SERIES
Discharge: HOME | End: 2023-11-06
Payer: MEDICARE

## 2023-11-06 PROCEDURE — 97113 AQUATIC THERAPY/EXERCISES: CPT

## 2023-11-06 RX ORDER — MECLIZINE HCL 12.5 MG/1
TABLET ORAL
Qty: 15 TABLET | Refills: 2 | Status: SHIPPED | OUTPATIENT
Start: 2023-11-06

## 2023-11-06 NOTE — FLOWSHEET NOTE
[x] [facility]) [facility] & Therapy  [address]  [place]: [phone]   F: [phone]    Physical Therapy Daily Treatment Note    Date:  2023  Patient Name:  Marc Brown    :  1945  MRN: [id number]  Physician: Ebony Gill MD                                 Insurance: [facility] ([facility] CAP, [facility] ORTHOPEDIC [place])  Medical Diagnosis:   M54.16 (ICD-10-CM) - Lumbar radiculopathy   R53.83 (ICD-10-CM) - Other fatigue                             Rehab Codes: M54.59 Low back pain  Onset Date:                                  Next 's appt: 10/13/23    Visit# / total visits:   Cancels/No Shows: 0/0    Subjective:  Pt reports increased fatigue in (B) hips after last visit but denies any pain. States no pain upon arrival today. Pain:  [] Yes  [x] No Location: Lower back L>R  Pain Rating: (0-10 scale) 0/10 upon arrival  Pain altered Tx:  [x] No  [] Yes  Action:    Comments: Reviewed core stability and improved postural awareness. Objective:    Precautions: None Note  Exercises:    [address] Exercise Log  Aquatic, Hip & DLS Program- Phase 1    Date of Eval:     10/11/23                           Primary PT: Michele Curran, PT    Things to Focus On (goals): Gross LE strengthening    [x] Medicare - [facility] cap      Date 10/30/23 11/2/23 11/6/23   Visit #    Walk F/L/R 2 Laps 2 Laps  2 Laps    Marching 2 Laps 2 Laps  2 Laps    All LE exs on Low box All LE exs on Low box All LE exs on Tall box   Squats 15x5\" 15x5\" 15x5\"   Step-Ups F/L Low 15x Tall 15x Tall 15x   Step Down F/L      Heel-toe raises 15x 15x 15x   SLR F/L/R 15x 15x 15x   Hip/Knee Flex/Ext 15x 15x 15x   F/L Lunges            Kickboard Ex.  Med Med Med   Iso Abd. 10x5\" 10x5\" 10x5\"   Push-pull 15x 15x 15x   Paddling 15x 15x 15x         UE Format:      Horiz Abd/Add 10x 12x 15x   IR/ER (wipers)  12x 15x   Alt Flex/Ext 10x 12x 15x   Alt Press Down  12x

## 2023-11-06 NOTE — TELEPHONE ENCOUNTER
LAST VISIT:   10/13/2023     Future Appointments   Date Time Provider Department Center   11/9/2023 10:30 AM Troy Martinez, PTA [facility]   11/13/2023 10:30 AM Troy Martinez, PTA [facility]   11/16/2023 10:30 AM Troy Martinez, PTA [facility]   11/22/2023 10:15 AM Edgar Delgado PT [facility]   11/22/2023 10:45 AM Judy Rangel, PTA [facility]   1/3/2024  1:00 PM [facility]

## 2023-11-09 ENCOUNTER — HOSPITAL ENCOUNTER (OUTPATIENT)
Dept: PHYSICAL THERAPY | Age: 78
Setting detail: THERAPIES SERIES
Discharge: HOME | End: 2023-11-09
Payer: MEDICARE

## 2023-11-09 PROCEDURE — 97113 AQUATIC THERAPY/EXERCISES: CPT

## 2023-11-09 NOTE — FLOWSHEET NOTE
[x] [facility]) [facility] & Therapy  [address]  [place]: [phone]   F: [phone]    Physical Therapy Daily Treatment Note    Date:  2023  Patient Name:  Dori Jones    :  1945  MRN: [id number]  Physician: Gwendolyn Hammer MD                                 Insurance: [facility] Advantage ([facility])  Medical Diagnosis:   M54.16 (ICD-10-CM) - Lumbar radiculopathy   R53.83 (ICD-10-CM) - Other fatigue                             Rehab Codes: M54.59 Low back pain  Onset Date:                                  Next 's appt: 10/13/23    Visit# / total visits:   Cancels/No Shows: 0/0    Subjective:  Pt reports no issues after last visit. States pain in lumbar back has been better. States gait has improved although endurance/duration is still about the same since starting therapy. Denies any pain upon arrival.    Pain:  [] Yes  [x] No Location: Lower back L>R  Pain Rating: (0-10 scale) 0/10 upon arrival  Pain altered Tx:  [x] No  [] Yes  Action:    Comments: Reviewed core stability and improved postural awareness. Objective:    Precautions: None   Exercises:    [address] [place] Exercise Log  Aquatic, Hip & DLS Program- Phase 1    Date of Eval:     10/11/23                           Primary PT: Jessica , PT    Things to Focus On (goals): Gross LE strengthening    [x] Medicare - track cap      Date 10/30/23 11/2/23 11/6/23 10/9/23    Visit #     Walk F/L/R 2 Laps 2 Laps  2 Laps  2 Laps    Marching 2 Laps 2 Laps  2 Laps 2 Laps     All LE exs on Low box All LE exs on Low box All LE exs on Tall box All LE exs on Tall box    Squats 15x5\" 15x5\" 15x5\" 15x5\"    Step-Ups F/L Low 15x Tall 15x Tall 15x Tall 15x    Step Down F/L        Heel-toe raises 15x 15x 15x 15x    SLR F/L/R 15x 15x 15x 15x    Hip/Knee Flex/Ext 15x 15x 15x 15x    F/L Lunges    Tall 10x            Kickboard Ex.  Med Med Med

## 2023-11-13 ENCOUNTER — HOSPITAL ENCOUNTER (OUTPATIENT)
Dept: PHYSICAL THERAPY | Age: 78
Setting detail: THERAPIES SERIES
Discharge: HOME | End: 2023-11-13
Payer: MEDICARE

## 2023-11-13 PROCEDURE — 97113 AQUATIC THERAPY/EXERCISES: CPT

## 2023-11-13 NOTE — FLOWSHEET NOTE
[x] [address] [facility] Therapy  [address]  [place]: [phone]   F: [phone]    Physical Therapy Daily Treatment Note    Date:  2023  Patient Name:  Katherine Rosenberg    :  1945  MRN: [id number]  Physician: Thee Calhoun MD                                 Insurance: [facility] ([facility] CAP, [facility] ORTHOPEDIC [place])  Medical Diagnosis:   M54.16 (ICD-10-CM) - Lumbar radiculopathy   R53.83 (ICD-10-CM) - Other fatigue                             Rehab Codes: M54.59 Low back pain  Onset Date:                                  Next 's appt: 10/13/23    Visit# / total visits: 10/12  Cancels/No Shows: 0/0    Subjective:  Pt reports still having some pain with extended walking and reaching. Notes working over head and squatting caused increased discomfort in lumbar back. Pain:  [] Yes  [x] No Location: Lower back L>R  Pain Rating: (0-10 scale) 0/10 upon arrival  Pain altered Tx:  [x] No  [] Yes  Action:    Comments: Reviewed core stability and improved postural awareness. Objective:    Precautions: None   Exercises:    [address] [place] Exercise Log  Aquatic, Hip & DLS Program- Phase 1    Date of Eval:     10/11/23                           Primary PT: Den Nix, KULDIP    Things to Focus On (goals): Gross LE strengthening    [x] Medicare - track cap      Date 10/30/23 11/2/23 11/6/23 11/9/23 11/13/23   Visit # 6/12 7/12 8/12 9/12 10/12   Walk F/L/R 2 Laps 2 Laps  2 Laps  2 Laps 2 Laps   Marching 2 Laps 2 Laps  2 Laps 2 Laps 2 Laps    All LE exs on Low box All LE exs on Low box All LE exs on Tall box All LE exs on Tall box All exs on Tall box   Squats 15x5\" 15x5\" 15x5\" 15x5\" 15x5\"   Step-Ups F/L Low 15x Tall 15x Tall 15x Tall 15x Tall 15x   Step Down F/L        Heel-toe raises 15x 15x 15x 15x 15x   SLR F/L/R 15x 15x 15x 15x 15x   Hip/Knee Flex/Ext 15x 15x 15x 15x 15x   F/L Lunges    Tall 10x Tall 10x           Kickboard Ex.

## 2023-11-16 ENCOUNTER — HOSPITAL ENCOUNTER (OUTPATIENT)
Dept: PHYSICAL THERAPY | Age: 78
Setting detail: THERAPIES SERIES
Discharge: HOME | End: 2023-11-16
Payer: MEDICARE

## 2023-11-16 PROCEDURE — 97113 AQUATIC THERAPY/EXERCISES: CPT

## 2023-11-16 NOTE — FLOWSHEET NOTE
[x] [facility]) [facility] & Therapy  [address]  [place]: [phone]   F: [phone]    Physical Therapy Daily Treatment Note    Date:  2023  Patient Name:  Ger Somers    :  1945  MRN: [id number]  Physician: Timi Trivedi MD                                 Insurance: [facility] ([facility] CAP, [facility] ORTHOPEDIC [place])  Medical Diagnosis:   M54.16 (ICD-10-CM) - Lumbar radiculopathy   R53.83 (ICD-10-CM) - Other fatigue                             Rehab Codes: M54.59 Low back pain  Onset Date:                                  Next 's appt: 10/13/23    Visit# / total visits:   Cancels/No Shows: 0/0    Subjective:  Pt reports doing more cleaning the past few days. States only feeling sore and stiff after cleaning all these years. Pain:  [] Yes  [x] No Location: Lower back L>R  Pain Rating: (0-10 scale) 0/10 upon arrival  Pain altered Tx:  [x] No  [] Yes  Action:    Comments: Reviewed core stability and improved postural awareness. Objective:    Precautions: None   Exercises:    [address] [place] Exercise Log  Aquatic, Hip & DLS Program- Phase 1    Date of Eval:     10/11/23                           Primary PT: Jamila Daley, PT    Things to Focus On (goals): Gross LE strengthening    [x] Medicare - track cap      Date 23   Visit # /12 9/12 10/12 11/12   Walk F/L/R 2 Laps  2 Laps 2 Laps 2 Laps    Marching 2 Laps 2 Laps 2 Laps 2 Laps    All LE exs on Tall box All LE exs on Tall box All exs on Tall box All exs on Tall box   Squats 15x5\" 15x5\" 15x5\" 15x5\"   Step-Ups F/L Tall 15x Tall 15x Tall 15x Tall 15x  Lat up&over   Heel tap F/L    Tall 10x    Heel-toe raises 15x 15x 15x    SLR F/L/R 15x 15x 15x 15x (short/fast)   Hip/Knee Flex/Ext 15x 15x 15x 15x   F/L Lunges  Tall 10x Tall 10x Tall 10x          Kickboard Ex.  Med Med Med Med   Iso Abd. 10x5\" 10x5\" 10x5\" 10x5\"   Push-pull 15x 15x 15x

## 2023-11-22 ENCOUNTER — HOSPITAL ENCOUNTER (OUTPATIENT)
Dept: PHYSICAL THERAPY | Age: 78
Setting detail: THERAPIES SERIES
Discharge: HOME | End: 2023-11-22
Payer: MEDICARE

## 2023-11-22 PROCEDURE — 97110 THERAPEUTIC EXERCISES: CPT

## 2023-11-22 PROCEDURE — 97113 AQUATIC THERAPY/EXERCISES: CPT

## 2023-11-22 NOTE — FLOWSHEET NOTE
[x] [facility]) [facility] & Therapy  [address]  [place]: [phone]   F: [phone]    Physical Therapy Daily Treatment Note- DISCHARGE    Date:  2023  Patient Name:  Joaquín Segundo    :  1945  MRN: [id number]  Physician: Michelle Santamaria MD                                 Insurance: [facility] ([facility] CAP, [facility] ORTHOPEDIC [place])  Medical Diagnosis:   M54.16 (ICD-10-CM) - Lumbar radiculopathy   R53.83 (ICD-10-CM) - Other fatigue                             Rehab Codes: M54.59 Low back pain  Onset Date:                                  Next 's appt: 10/13/23    Visit# / total visits:   Cancels/No Shows: 0/0    Subjective:  Overall feels she is doing well. Notes less leg cramps since starting therapy so knows she needs to keep up with it on her own. Pain:  [] Yes  [x] No Location: Lower back L>R  Pain Rating: (0-10 scale) 0/10 upon arrival  Pain altered Tx:  [x] No  [] Yes  Action:      Objective:    Precautions: None   Exercises:    [address] [employer] Exercise Log  Aquatic, Hip & DLS Program- Phase 1    Date of Eval:     10/11/23                           Primary PT: Oleg Bowels, PT    Things to Focus On (goals): Gross LE strengthening    [x] Medicare - track cap      Date 23   Visit # 12/12   Walk F/L/R 2 Laps    Marching 2 Laps    All exs on Tall box   Squats 15x5\"   Step-Ups F/L Tall 15x  Lat up&over   Heel tap F/L Tall 10x    Heel-toe raises    SLR F/L/R 15x (short/fast)   Hip/Knee Flex/Ext 15x   F/L Lunges Tall 10x       Kickboard Ex.  Large   Iso Abd. 10x5\"   Push-pull 15x   Paddling 15x       UE Format:    Horiz Abd/Add 15x   IR/ER (wipers) 15x   Alt Flex/Ext 15x   Alt Press Down 15x   Abd/Add 15x       [place]: 1 Noodle   Hang 5'   Cycling 2'   Jacks 2'   X-Country 2'       Balance    SLS        Stretches    Achllies 2x20\"   Hamstring 2x20\"       Breast Stroke 2 Laps   Pain Rating 0

## 2023-11-27 RX ORDER — ALLOPURINOL 100 MG/1
TABLET ORAL
Qty: 90 TABLET | Refills: 3 | Status: SHIPPED | OUTPATIENT
Start: 2023-11-27

## 2023-12-04 LAB — CALCIUM IONIZED SERUM: 5.28 MG/DL (ref 4.7–5.9)

## 2023-12-05 LAB — PARATHYROID HORMONE INTACT: 52 PG/ML (ref 15–65)

## 2024-01-03 ENCOUNTER — OFFICE VISIT (OUTPATIENT)
Dept: PRIMARY CARE CLINIC | Age: 79
End: 2024-01-03
Payer: MEDICARE

## 2024-01-03 VITALS
WEIGHT: 238.8 LBS | SYSTOLIC BLOOD PRESSURE: 106 MMHG | BODY MASS INDEX: 42.3 KG/M2 | OXYGEN SATURATION: 92 % | DIASTOLIC BLOOD PRESSURE: 68 MMHG | HEART RATE: 87 BPM

## 2024-01-03 DIAGNOSIS — G89.29 CHRONIC LEFT SHOULDER PAIN: Primary | ICD-10-CM

## 2024-01-03 DIAGNOSIS — E03.9 ACQUIRED HYPOTHYROIDISM: Chronic | ICD-10-CM

## 2024-01-03 DIAGNOSIS — N18.32 STAGE 3B CHRONIC KIDNEY DISEASE (HCC): ICD-10-CM

## 2024-01-03 DIAGNOSIS — F33.41 RECURRENT MAJOR DEPRESSIVE DISORDER, IN PARTIAL REMISSION (HCC): ICD-10-CM

## 2024-01-03 DIAGNOSIS — M25.512 CHRONIC LEFT SHOULDER PAIN: Primary | ICD-10-CM

## 2024-01-03 DIAGNOSIS — E66.01 OBESITY, CLASS III, BMI 40-49.9 (MORBID OBESITY) (HCC): ICD-10-CM

## 2024-01-03 DIAGNOSIS — M46.1 BILATERAL SACROILIITIS (HCC): ICD-10-CM

## 2024-01-03 PROCEDURE — 99214 OFFICE O/P EST MOD 30 MIN: CPT | Performed by: FAMILY MEDICINE

## 2024-01-03 PROCEDURE — 1123F ACP DISCUSS/DSCN MKR DOCD: CPT | Performed by: FAMILY MEDICINE

## 2024-01-03 PROCEDURE — 3074F SYST BP LT 130 MM HG: CPT | Performed by: FAMILY MEDICINE

## 2024-01-03 PROCEDURE — 3078F DIAST BP <80 MM HG: CPT | Performed by: FAMILY MEDICINE

## 2024-01-03 RX ORDER — LEVOTHYROXINE SODIUM 0.12 MG/1
TABLET ORAL
Qty: 90 TABLET | Refills: 3
Start: 2024-01-03

## 2024-01-03 ASSESSMENT — PATIENT HEALTH QUESTIONNAIRE - PHQ9
10. IF YOU CHECKED OFF ANY PROBLEMS, HOW DIFFICULT HAVE THESE PROBLEMS MADE IT FOR YOU TO DO YOUR WORK, TAKE CARE OF THINGS AT HOME, OR GET ALONG WITH OTHER PEOPLE: 1
4. FEELING TIRED OR HAVING LITTLE ENERGY: 2
SUM OF ALL RESPONSES TO PHQ9 QUESTIONS 1 & 2: 1
SUM OF ALL RESPONSES TO PHQ QUESTIONS 1-9: 4
SUM OF ALL RESPONSES TO PHQ QUESTIONS 1-9: 4
2. FEELING DOWN, DEPRESSED OR HOPELESS: 1
SUM OF ALL RESPONSES TO PHQ QUESTIONS 1-9: 4
9. THOUGHTS THAT YOU WOULD BE BETTER OFF DEAD, OR OF HURTING YOURSELF: 0
3. TROUBLE FALLING OR STAYING ASLEEP: 0
6. FEELING BAD ABOUT YOURSELF - OR THAT YOU ARE A FAILURE OR HAVE LET YOURSELF OR YOUR FAMILY DOWN: 0
7. TROUBLE CONCENTRATING ON THINGS, SUCH AS READING THE NEWSPAPER OR WATCHING TELEVISION: 0
8. MOVING OR SPEAKING SO SLOWLY THAT OTHER PEOPLE COULD HAVE NOTICED. OR THE OPPOSITE, BEING SO FIGETY OR RESTLESS THAT YOU HAVE BEEN MOVING AROUND A LOT MORE THAN USUAL: 0
1. LITTLE INTEREST OR PLEASURE IN DOING THINGS: 0
5. POOR APPETITE OR OVEREATING: 1
SUM OF ALL RESPONSES TO PHQ QUESTIONS 1-9: 4

## 2024-01-03 NOTE — PROGRESS NOTES
[facility]  [facility]  [address]  [facility] [address]  Dept: [phone]    Liyah Valladares is a 78 y.o. female Established patient, who presents today for her medical conditions/complaints as noted below.      Chief Complaint   Patient presents with    Medication Check    Cough     X2 weeks    Arm Pain     Left arm pain    Fatigue       HPI:   Sick- was taking cough pills, and abx.  Cough drops help more than anything.    Arm pain has been about a month, worse the last week.  Decreased ROM in shoulder.  Therapy helped with her back, but getting expensive.      Reviewed prior notes: None   Reviewed previous:  Labs    LDL Cholesterol (mg/dL)   Date Value   09/18/2013 104 (H)   05/15/2012 86     LDL Calculated (mg/dL)   Date Value   05/29/2020 87   06/18/2018 69   02/20/2017 66       (goal LDL is <100)   AST (U/L)   Date Value   12/01/2023 27     ALT (U/L)   Date Value   12/01/2023 31     BUN (mg/dL)   Date Value   12/01/2023 23     Hemoglobin A1C (%)   Date Value   10/04/2023 6.4 (H)     TSH (uIu/mL)   Date Value   12/01/2023 5.950 (H)     BP Readings from Last 3 Encounters:   01/03/24 106/68   10/13/23 120/72   09/25/23 126/72          (goal 120/80)    Past Medical History:   Diagnosis Date    Acute cystitis with hematuria 8/23/2019    Acute idiopathic gout of foot 8/23/2019    Arthritis     Colon polyp     Glaucoma     right eye    Hyperlipidemia     Hypertension     Inflamed skin tag 5/15/2017    Renal insufficiency 2/2/2016    Spinal stenosis     Thyroid disease     hypothyroidism      Past Surgical History:   Procedure Laterality Date    BREAST SURGERY Left     open breast biopsy    CHOLECYSTECTOMY      COLONOSCOPY      COLONOSCOPY N/A 4/19/2018    COLONOSCOPY POLYPECTOMY SNARE HOT BIOPSY performed by Crystal Koch MD at [facility] OR    DILATION AND CURETTAGE OF UTERUS      TOTAL KNEE ARTHROPLASTY Bilateral        Family History   Problem Relation Age of Onset

## 2024-02-07 ENCOUNTER — OFFICE VISIT (OUTPATIENT)
Dept: PRIMARY CARE CLINIC | Age: 79
End: 2024-02-07
Payer: MEDICARE

## 2024-02-07 VITALS
HEART RATE: 96 BPM | SYSTOLIC BLOOD PRESSURE: 112 MMHG | WEIGHT: 236.8 LBS | OXYGEN SATURATION: 95 % | BODY MASS INDEX: 41.95 KG/M2 | TEMPERATURE: 97.7 F | DIASTOLIC BLOOD PRESSURE: 74 MMHG

## 2024-02-07 DIAGNOSIS — R05.3 PERSISTENT COUGH FOR 3 WEEKS OR LONGER: Primary | ICD-10-CM

## 2024-02-07 PROCEDURE — 99213 OFFICE O/P EST LOW 20 MIN: CPT | Performed by: STUDENT IN AN ORGANIZED HEALTH CARE EDUCATION/TRAINING PROGRAM

## 2024-02-07 PROCEDURE — 3074F SYST BP LT 130 MM HG: CPT | Performed by: STUDENT IN AN ORGANIZED HEALTH CARE EDUCATION/TRAINING PROGRAM

## 2024-02-07 PROCEDURE — 3078F DIAST BP <80 MM HG: CPT | Performed by: STUDENT IN AN ORGANIZED HEALTH CARE EDUCATION/TRAINING PROGRAM

## 2024-02-07 PROCEDURE — 1123F ACP DISCUSS/DSCN MKR DOCD: CPT | Performed by: STUDENT IN AN ORGANIZED HEALTH CARE EDUCATION/TRAINING PROGRAM

## 2024-02-07 RX ORDER — SIMVASTATIN 20 MG
TABLET ORAL
Qty: 90 TABLET | Refills: 3 | Status: SHIPPED | OUTPATIENT
Start: 2024-02-07

## 2024-02-07 RX ORDER — BENZONATATE 100 MG/1
100 CAPSULE ORAL 3 TIMES DAILY PRN
Qty: 30 CAPSULE | Refills: 0 | Status: SHIPPED | OUTPATIENT
Start: 2024-02-07 | End: 2024-02-17

## 2024-02-07 ASSESSMENT — ENCOUNTER SYMPTOMS
SHORTNESS OF BREATH: 1
COUGH: 1

## 2024-02-07 NOTE — PROGRESS NOTES
Depression Monitoring  01/03/2025    DTaP/Tdap/Td vaccine (3 - Td or Tdap) 01/19/2032    DEXA (modify frequency per FRAX score)  Completed    Flu vaccine  Completed    Shingles vaccine  Completed    Pneumococcal 65+ years Vaccine  Completed    Hepatitis A vaccine  Aged Out    Hepatitis B vaccine  Aged Out    Hib vaccine  Aged Out    Polio vaccine  Aged Out    Meningococcal (ACWY) vaccine  Aged Out       Subjective:      Review of Systems   Constitutional:  Negative for chills and fever.   HENT:  Positive for congestion.    Respiratory:  Positive for cough and shortness of breath (Not worse than normal).    Cardiovascular:  Negative for chest pain.       Objective:     /74   Pulse 96   Temp 97.7 °F (36.5 °C) (Oral)   Wt 107.4 kg (236 lb 12.8 oz)   SpO2 95%   BMI 41.95 kg/m²   Physical Exam  Vitals and nursing note reviewed.   Constitutional:       General: She is not in acute distress.     Appearance: She is not ill-appearing.   HENT:      Head: Atraumatic.   Eyes:      Extraocular Movements: Extraocular movements intact.      Pupils: Pupils are equal, round, and reactive to light.   Cardiovascular:      Rate and Rhythm: Normal rate and regular rhythm.      Heart sounds: No murmur heard.     No friction rub. No gallop.   Pulmonary:      Effort: Pulmonary effort is normal. No respiratory distress.      Breath sounds: Normal breath sounds. No wheezing, rhonchi or rales.   Musculoskeletal:      Right lower leg: No edema.      Left lower leg: No edema.   Neurological:      Mental Status: She is alert.   Psychiatric:         Mood and Affect: Mood normal.         Behavior: Behavior normal.         Thought Content: Thought content normal.         Judgment: Judgment normal.         Assessment:       Diagnosis Orders   1. Persistent cough for 3 weeks or longer  benzonatate (TESSALON) 100 MG capsule         Appears to be doing well. Cough seems mild-to-moderate. She has room for optimization with OTC regimen.  Plan:

## 2024-02-21 ENCOUNTER — OFFICE VISIT (OUTPATIENT)
Dept: PRIMARY CARE CLINIC | Age: 79
End: 2024-02-21

## 2024-02-21 VITALS
SYSTOLIC BLOOD PRESSURE: 120 MMHG | OXYGEN SATURATION: 96 % | BODY MASS INDEX: 42.49 KG/M2 | HEIGHT: 63 IN | WEIGHT: 239.8 LBS | HEART RATE: 83 BPM | DIASTOLIC BLOOD PRESSURE: 70 MMHG

## 2024-02-21 DIAGNOSIS — R05.3 CHRONIC COUGH: ICD-10-CM

## 2024-02-21 DIAGNOSIS — R05.8 POST-VIRAL COUGH SYNDROME: Primary | ICD-10-CM

## 2024-02-21 RX ORDER — BENZONATATE 100 MG/1
200 CAPSULE ORAL 3 TIMES DAILY PRN
Qty: 60 CAPSULE | Refills: 0 | Status: SHIPPED | OUTPATIENT
Start: 2024-02-21 | End: 2024-03-02

## 2024-02-21 RX ORDER — PREDNISONE 20 MG/1
20 TABLET ORAL 2 TIMES DAILY
Qty: 10 TABLET | Refills: 0 | Status: SHIPPED | OUTPATIENT
Start: 2024-02-21 | End: 2024-02-26

## 2024-02-21 RX ORDER — FAMOTIDINE 20 MG/1
20 TABLET, FILM COATED ORAL 2 TIMES DAILY
Qty: 60 TABLET | Refills: 0 | Status: SHIPPED | OUTPATIENT
Start: 2024-02-21

## 2024-02-21 ASSESSMENT — ENCOUNTER SYMPTOMS
DIARRHEA: 0
SORE THROAT: 0
ABDOMINAL PAIN: 0
ABDOMINAL DISTENTION: 0
COUGH: 0
CHEST TIGHTNESS: 0
SHORTNESS OF BREATH: 0
CONSTIPATION: 0

## 2024-02-21 NOTE — PROGRESS NOTES
[facility]  [facility] C[facility]  [address] [address]  Dept: [phone]    Liyah Valladares is a 78 y.o. female Established patient, who presents today for her medical conditions/complaints as noted below.      Chief Complaint   Patient presents with    Cough     Pt states ongoing cough for 9 weeks. Pt has taken meds without relief.       HPI:     HPI: The patient is a pleasant 78-year-old female who presents today with concerns of cough ongoing for 9 weeks. Started with cough from chest.  Initially was prescribed antibiotic z pack.  Had tried cough drops, Tessalon Perles, Sudafed, saline irrigation. Chest pain with the cough. No fevers. Can occur to the point of wanting to throw up. No leg swelling. Some SOB with this. No change with food.     Did have to stop HCTZ back in October.     Reviewed prior notes None  Reviewed previous Labs    LDL Cholesterol (mg/dL)   Date Value   09/18/2013 104 (H)   05/15/2012 86     LDL Calculated (mg/dL)   Date Value   05/29/2020 87   06/18/2018 69   02/20/2017 66       (goal LDL is <100)   AST (U/L)   Date Value   12/01/2023 27     ALT (U/L)   Date Value   12/01/2023 31     BUN (mg/dL)   Date Value   12/01/2023 23     Hemoglobin A1C (%)   Date Value   10/04/2023 6.4 (H)     TSH (uIu/mL)   Date Value   12/01/2023 5.950 (H)     BP Readings from Last 3 Encounters:   02/21/24 120/70   02/07/24 112/74   01/03/24 106/68          (goal 120/80)  Hemoglobin A1C   Date Value Ref Range Status   10/04/2023 6.4 (H) 4.2 - 5.6 % Final     Comment:              [employer] GUIDELINES FOR HGB A1C:            PREDIABETES/INCREASED RISK . . . . . . . 5.7-6.4%            DIAGNOSIS OF DIABETES  . . . . . . . . . >=6.5%              WITH CONFIRMATION OR APPROPRIATE SYMPTOMS       NOTE: ASSAY MAY BE AFFECTED BY HEMOGLOBINOPATHIES (SICKLE       CELL ANEMIA, S-C DISEASE, OTHERS) OR ARTIFICIALLY LOWERED BY       DECREASED RED CELL SURVIVAL

## 2024-02-22 ENCOUNTER — HOSPITAL ENCOUNTER (OUTPATIENT)
Dept: GENERAL RADIOLOGY | Age: 79
Discharge: HOME | End: 2024-02-24
Payer: MEDICARE

## 2024-02-22 ENCOUNTER — HOSPITAL ENCOUNTER (OUTPATIENT)
Age: 79
Discharge: HOME | End: 2024-02-24
Payer: MEDICARE

## 2024-02-22 DIAGNOSIS — R05.3 CHRONIC COUGH: ICD-10-CM

## 2024-02-22 DIAGNOSIS — R05.8 POST-VIRAL COUGH SYNDROME: ICD-10-CM

## 2024-02-22 PROCEDURE — 71046 X-RAY EXAM CHEST 2 VIEWS: CPT

## 2024-03-07 LAB
ANION GAP SERPL CALCULATED.3IONS-SCNC: 8 MEQ/L (ref 7–16)
BUN BLDV-MCNC: 16 MG/DL (ref 8–23)
CALCIUM SERPL-MCNC: 10.8 MG/DL (ref 8.5–10.5)
CHLORIDE BLD-SCNC: 106 MEQ/L (ref 95–107)
CO2: 30 MEQ/L (ref 19–31)
CREAT SERPL-MCNC: 1.4 MG/DL (ref 0.6–1.3)
EGFR IF NONAFRICAN AMERICAN: 38 ML/MIN/1.73
GLUCOSE: 101 MG/DL (ref 70–99)
POTASSIUM SERPL-SCNC: 4.7 MEQ/L (ref 3.5–5.4)
SODIUM BLD-SCNC: 144 MEQ/L (ref 133–146)
TSH SERPL DL<=0.05 MIU/L-ACNC: 0.86 UIU/ML (ref 0.4–4.1)

## 2024-04-02 DIAGNOSIS — I10 ESSENTIAL HYPERTENSION: Chronic | ICD-10-CM

## 2024-04-02 RX ORDER — LOSARTAN POTASSIUM 50 MG/1
50 TABLET ORAL DAILY
Qty: 90 TABLET | Refills: 3 | Status: SHIPPED | OUTPATIENT
Start: 2024-04-02

## 2024-04-03 ENCOUNTER — OFFICE VISIT (OUTPATIENT)
Dept: PRIMARY CARE CLINIC | Age: 79
End: 2024-04-03

## 2024-04-03 VITALS
OXYGEN SATURATION: 98 % | HEIGHT: 63 IN | DIASTOLIC BLOOD PRESSURE: 78 MMHG | BODY MASS INDEX: 41.96 KG/M2 | SYSTOLIC BLOOD PRESSURE: 122 MMHG | HEART RATE: 83 BPM | WEIGHT: 236.8 LBS

## 2024-04-03 DIAGNOSIS — B37.2 YEAST DERMATITIS: ICD-10-CM

## 2024-04-03 DIAGNOSIS — R05.2 SUBACUTE COUGH: Primary | ICD-10-CM

## 2024-04-03 DIAGNOSIS — Z12.31 ENCOUNTER FOR SCREENING MAMMOGRAM FOR BREAST CANCER: ICD-10-CM

## 2024-04-03 DIAGNOSIS — I10 PRIMARY HYPERTENSION: Chronic | ICD-10-CM

## 2024-04-03 RX ORDER — NYSTATIN 100000 [USP'U]/G
POWDER TOPICAL
Qty: 120 G | Refills: 3 | Status: SHIPPED | OUTPATIENT
Start: 2024-04-03

## 2024-04-03 RX ORDER — LEVOTHYROXINE SODIUM 0.12 MG/1
TABLET ORAL
Qty: 102 TABLET | Refills: 3 | Status: SHIPPED | OUTPATIENT
Start: 2024-04-03

## 2024-04-03 SDOH — ECONOMIC STABILITY: FOOD INSECURITY: WITHIN THE PAST 12 MONTHS, YOU WORRIED THAT YOUR FOOD WOULD RUN OUT BEFORE YOU GOT MONEY TO BUY MORE.: NEVER TRUE

## 2024-04-03 SDOH — ECONOMIC STABILITY: FOOD INSECURITY: WITHIN THE PAST 12 MONTHS, THE FOOD YOU BOUGHT JUST DIDN'T LAST AND YOU DIDN'T HAVE MONEY TO GET MORE.: NEVER TRUE

## 2024-04-03 SDOH — ECONOMIC STABILITY: INCOME INSECURITY: HOW HARD IS IT FOR YOU TO PAY FOR THE VERY BASICS LIKE FOOD, HOUSING, MEDICAL CARE, AND HEATING?: NOT HARD AT ALL

## 2024-04-03 ASSESSMENT — PATIENT HEALTH QUESTIONNAIRE - PHQ9
1. LITTLE INTEREST OR PLEASURE IN DOING THINGS: SEVERAL DAYS
7. TROUBLE CONCENTRATING ON THINGS, SUCH AS READING THE NEWSPAPER OR WATCHING TELEVISION: NOT AT ALL
4. FEELING TIRED OR HAVING LITTLE ENERGY: NOT AT ALL
SUM OF ALL RESPONSES TO PHQ QUESTIONS 1-9: 2
5. POOR APPETITE OR OVEREATING: NOT AT ALL
SUM OF ALL RESPONSES TO PHQ9 QUESTIONS 1 & 2: 2
10. IF YOU CHECKED OFF ANY PROBLEMS, HOW DIFFICULT HAVE THESE PROBLEMS MADE IT FOR YOU TO DO YOUR WORK, TAKE CARE OF THINGS AT HOME, OR GET ALONG WITH OTHER PEOPLE: NOT DIFFICULT AT ALL
SUM OF ALL RESPONSES TO PHQ QUESTIONS 1-9: 2
8. MOVING OR SPEAKING SO SLOWLY THAT OTHER PEOPLE COULD HAVE NOTICED. OR THE OPPOSITE, BEING SO FIGETY OR RESTLESS THAT YOU HAVE BEEN MOVING AROUND A LOT MORE THAN USUAL: NOT AT ALL
SUM OF ALL RESPONSES TO PHQ QUESTIONS 1-9: 2
2. FEELING DOWN, DEPRESSED OR HOPELESS: SEVERAL DAYS
9. THOUGHTS THAT YOU WOULD BE BETTER OFF DEAD, OR OF HURTING YOURSELF: NOT AT ALL
3. TROUBLE FALLING OR STAYING ASLEEP: NOT AT ALL
6. FEELING BAD ABOUT YOURSELF - OR THAT YOU ARE A FAILURE OR HAVE LET YOURSELF OR YOUR FAMILY DOWN: NOT AT ALL
SUM OF ALL RESPONSES TO PHQ QUESTIONS 1-9: 2

## 2024-04-03 ASSESSMENT — ENCOUNTER SYMPTOMS
SHORTNESS OF BREATH: 0
COUGH: 0

## 2024-04-03 NOTE — PROGRESS NOTES
60+ series) Never done    Lipids  05/29/2021    COVID-19 Vaccine (6 - 2023-24 season) 09/01/2023    Annual Wellness Visit (Medicare Advantage)  01/01/2024    Depression Monitoring  01/03/2025    DTaP/Tdap/Td vaccine (3 - Td or Tdap) 01/19/2032    DEXA (modify frequency per FRAX score)  Completed    Flu vaccine  Completed    Shingles vaccine  Completed    Pneumococcal 65+ years Vaccine  Completed    Hepatitis A vaccine  Aged Out    Hepatitis B vaccine  Aged Out    Hib vaccine  Aged Out    Polio vaccine  Aged Out    Meningococcal (ACWY) vaccine  Aged Out       Subjective:      Review of Systems   Constitutional:  Negative for chills and fever.   Respiratory:  Negative for cough and shortness of breath.    Neurological:  Negative for dizziness and light-headedness.       Objective:     /78   Pulse 83   Ht 1.6 m (5' 2.99\")   Wt 107.4 kg (236 lb 12.8 oz)   SpO2 98%   BMI 41.96 kg/m²   Physical Exam  Vitals and nursing note reviewed.   Constitutional:       General: She is not in acute distress.     Appearance: She is well-developed. She is not ill-appearing.   HENT:      Head: Normocephalic and atraumatic.      Right Ear: External ear normal.      Left Ear: External ear normal.   Eyes:      General: No scleral icterus.        Right eye: No discharge.         Left eye: No discharge.      Conjunctiva/sclera: Conjunctivae normal.   Neck:      Thyroid: No thyromegaly.      Trachea: No tracheal deviation.   Cardiovascular:      Rate and Rhythm: Normal rate and regular rhythm.      Heart sounds: Normal heart sounds.   Pulmonary:      Effort: Pulmonary effort is normal. No respiratory distress.      Breath sounds: Normal breath sounds. No wheezing.   Lymphadenopathy:      Cervical: No cervical adenopathy.   Skin:     General: Skin is warm.      Findings: No rash.   Neurological:      Mental Status: She is alert and oriented to person, place, and time.   Psychiatric:         Mood and Affect: Mood normal.

## 2024-04-10 ENCOUNTER — HOSPITAL ENCOUNTER (OUTPATIENT)
Dept: WOMENS IMAGING | Age: 79
Discharge: HOME | End: 2024-04-12
Payer: MEDICARE

## 2024-04-10 DIAGNOSIS — Z12.31 ENCOUNTER FOR SCREENING MAMMOGRAM FOR BREAST CANCER: ICD-10-CM

## 2024-04-10 PROCEDURE — 77063 BREAST TOMOSYNTHESIS BI: CPT

## 2024-04-30 RX ORDER — BUPROPION HYDROCHLORIDE 150 MG/1
150 TABLET ORAL EVERY MORNING
Qty: 90 TABLET | Refills: 3 | Status: SHIPPED | OUTPATIENT
Start: 2024-04-30

## 2024-08-12 ENCOUNTER — OFFICE VISIT (OUTPATIENT)
Dept: PRIMARY CARE CLINIC | Age: 79
End: 2024-08-12

## 2024-08-12 VITALS
OXYGEN SATURATION: 97 % | DIASTOLIC BLOOD PRESSURE: 74 MMHG | WEIGHT: 236.8 LBS | BODY MASS INDEX: 41.96 KG/M2 | SYSTOLIC BLOOD PRESSURE: 122 MMHG | HEIGHT: 63 IN | HEART RATE: 81 BPM

## 2024-08-12 DIAGNOSIS — N39.3 STRESS INCONTINENCE, FEMALE: Chronic | ICD-10-CM

## 2024-08-12 DIAGNOSIS — Z00.00 MEDICARE ANNUAL WELLNESS VISIT, SUBSEQUENT: Primary | ICD-10-CM

## 2024-08-12 RX ORDER — CLOTRIMAZOLE AND BETAMETHASONE DIPROPIONATE 10; .64 MG/G; MG/G
CREAM TOPICAL
Qty: 45 G | Refills: 1 | Status: SHIPPED | OUTPATIENT
Start: 2024-08-12

## 2024-08-12 ASSESSMENT — PATIENT HEALTH QUESTIONNAIRE - PHQ9
3. TROUBLE FALLING OR STAYING ASLEEP: NOT AT ALL
2. FEELING DOWN, DEPRESSED OR HOPELESS: SEVERAL DAYS
SUM OF ALL RESPONSES TO PHQ QUESTIONS 1-9: 2
1. LITTLE INTEREST OR PLEASURE IN DOING THINGS: SEVERAL DAYS
4. FEELING TIRED OR HAVING LITTLE ENERGY: NOT AT ALL
9. THOUGHTS THAT YOU WOULD BE BETTER OFF DEAD, OR OF HURTING YOURSELF: NOT AT ALL
7. TROUBLE CONCENTRATING ON THINGS, SUCH AS READING THE NEWSPAPER OR WATCHING TELEVISION: NOT AT ALL
SUM OF ALL RESPONSES TO PHQ QUESTIONS 1-9: 2
8. MOVING OR SPEAKING SO SLOWLY THAT OTHER PEOPLE COULD HAVE NOTICED. OR THE OPPOSITE, BEING SO FIGETY OR RESTLESS THAT YOU HAVE BEEN MOVING AROUND A LOT MORE THAN USUAL: NOT AT ALL
6. FEELING BAD ABOUT YOURSELF - OR THAT YOU ARE A FAILURE OR HAVE LET YOURSELF OR YOUR FAMILY DOWN: NOT AT ALL
SUM OF ALL RESPONSES TO PHQ9 QUESTIONS 1 & 2: 2

## 2024-08-12 NOTE — PATIENT INSTRUCTIONS
may include:    Chest pain or pressure, or a strange feeling in the chest.     Sweating.     Shortness of breath.     Pain, pressure, or a strange feeling in the back, neck, jaw, or upper belly or in one or both shoulders or arms.     Lightheadedness or sudden weakness.     A fast or irregular heartbeat.   After you call 911, the  may tell you to chew 1 adult-strength or 2 to 4 low-dose aspirin. Wait for an ambulance. Do not try to drive yourself.  Watch closely for changes in your health, and be sure to contact your doctor if you have any problems.  Where can you learn more?  Go to https://www.Sp[employer].net/patientEd and enter F075 to learn more about \"A Healthy Heart: Care Instructions.\"  Current as of: June 24, 2023  Content Version: 14.1  © 7810-0886 [employer].   Care instructions adapted under license by [employer]. If you have questions about a medical condition or this instruction, always ask your healthcare professional. [employer] disclaims any warranty or liability for your use of this information.      Personalized Preventive Plan for Liyah Valladares - 8/12/2024  Medicare offers a range of preventive health benefits. Some of the tests and screenings are paid in full while other may be subject to a deductible, co-insurance, and/or copay.    Some of these benefits include a comprehensive review of your medical history including lifestyle, illnesses that may run in your family, and various assessments and screenings as appropriate.    After reviewing your medical record and screening and assessments performed today your provider may have ordered immunizations, labs, imaging, and/or referrals for you.  A list of these orders (if applicable) as well as your Preventive Care list are included within your After Visit Summary for your review.    Other Preventive Recommendations:    A preventive eye exam performed by an eye specialist is recommended every 1-2 years to screen

## 2024-08-12 NOTE — PROGRESS NOTES
Medicare Annual Wellness Visit    Liyah Valladares is here for Medicare AWV (Pt here today for Medicare Annual Wellness Visit. Pt given three words to remember: Lion, Purple, Angry. Time given to draw was 11:15.), Swelling (Patient complains of RT foot swelling x1 month), Breast Problem (Patient states her RT breast is shrinking, Patient states nystatin powder is not working under breasts.), Incontinence, and Fatigue    Assessment & Plan   Medicare annual wellness visit, subsequent  Stress incontinence, female  -     [facility] - [place]    Recommendations for Preventive Services Due: see orders and patient instructions/AVS.  Recommended screening schedule for the next 5-10 years is provided to the patient in written form: see Patient Instructions/AVS.     No follow-ups on file.     Subjective   The following acute and/or chronic problems were also addressed today:  Swelling in feet- right more than left.  Can't get her shoes on.  Has been about a month.  Keep them elevated, compression socks.  Avoid diuretics d/t kidney function.   Rash under breasts- difficulty getting powder in the right place. Will try lotrisone cream but if gets worse pt is to call. Right breast is smaller- advised pt that it is nothing to worry about and to monitor for any dimpling or puckering of skin.  Incontinence- stress- and nocturnal- ref to pelvic floor therapy  Fatigue- sleeping okay- hopefully therapy will hep with this      Patient's complete Health Risk Assessment and screening values have been reviewed and are found in Flowsheets. The following problems were reviewed today and where indicated follow up appointments were made and/or referrals ordered.    Positive Risk Factor Screenings with Interventions:    Fall Risk:  Do you feel unsteady or are you worried about falling? : (!) yes  2 or more falls in past year?: no  Fall with injury in past year?: no     Interventions:    Reviewed medications, home hazards,

## 2024-08-15 ENCOUNTER — HOSPITAL ENCOUNTER (OUTPATIENT)
Dept: PHYSICAL THERAPY | Facility: CLINIC | Age: 79
Setting detail: THERAPIES SERIES
Discharge: HOME | End: 2024-08-15
Payer: MEDICARE

## 2024-08-15 PROCEDURE — 97530 THERAPEUTIC ACTIVITIES: CPT

## 2024-08-15 PROCEDURE — 97110 THERAPEUTIC EXERCISES: CPT

## 2024-08-15 PROCEDURE — 97161 PT EVAL LOW COMPLEX 20 MIN: CPT

## 2024-08-15 NOTE — CONSULTS
to complete supine to sit independently in order to indicate improved core strength and increase support to pelvic floor    Patient will improve (B) LE strength to 5/5 in order to increase stability to pelvis   Patient will report reduced JANNET symptoms with use of knack technique     Rehab Potential:  [x] Good  [] Fair  [] Poor   Suggested Professional Referral:  [x] No  [] Yes:  Barriers to Goal Achievement:  [x] No  [] Yes:  Domestic Concerns:  [x] No  [] Yes:    Pt. Education:  [x] Plans/Goals, Risks/Benefits discussed  [x] Home exercise program  Access Code: [email or web address]  URL: [email or web address]  Date: 08/15/2024  Prepared by: Karoline Ortega    Exercises  - Supine Piriformis Stretch with Foot on Ground  - 2 x daily - 7 x weekly - 3 sets - 30 sec hold  - Supine Gluteal Sets  - 1 x daily - 7 x weekly - 2 sets - 10 reps - 3 sec hold  - Clamshell  - 2 x daily - 7 x weekly - 2 sets - 10 reps  - Supine Transversus Abdominis Bracing - Hands on Stomach  - 2 x daily - 7 x weekly - 2 sets - 10 reps - 5 sec hold  - Supine Pelvic Floor Contraction  - 2 x daily - 7 x weekly - 2 sets - 10 reps - 3 ec hold    Method of Education: [x] Verbal  [x] Demo  [] Written  Comprehension of Education:  [x] Verbalizes understanding.  [x] Demonstrates understanding.  [x] Needs Review.  [] Demonstrates/verbalizes understanding of HEP/Ed previously given.    Treatment Plan:  [x] Therapeutic Exercise   61171  [] Iontophoresis: 4 mg/mL Dexamethasone Sodium Phosphate  mAmin  58370   [x] Therapeutic Activity  48505 [] Vasopneumatic cold with compression  14858    [] Gait Training   98770 [] Ultrasound   80409   [] Neuromuscular Re-education  93545 [x] Electrical Stimulation Unattended  64088   [x] Manual Therapy  47771 [] Electrical Stimulation Attended  01108   [x] Instruction in HEP  [] Lumbar/Cervical Traction  46331   [] Aquatic Therapy   50557 [x] Cold/hotpack    [] Massage   29110      [] Dry Needling, 1 or 2 muscles  85557   [x]

## 2024-08-23 ENCOUNTER — HOSPITAL ENCOUNTER (OUTPATIENT)
Dept: PHYSICAL THERAPY | Facility: CLINIC | Age: 79
Setting detail: THERAPIES SERIES
Discharge: HOME | End: 2024-08-23
Payer: MEDICARE

## 2024-08-23 PROCEDURE — 97110 THERAPEUTIC EXERCISES: CPT

## 2024-08-23 NOTE — FLOWSHEET NOTE
[] [facility]  Outpatient Rehabilitation &  Therapy  [address]  [phone]  [phone] [] [facility]  Outpatient Rehabilitation &  Therapy  [address]  P: [phone]  F: [phone] [] [facility]  Outpatient Rehabilitation &  Therapy  [address]  P: [phone]  F: [phone] [] [facility]  Outpatient Rehabilitation &  Therapy  [address]  [phone]  [phone] [] [facility]  Outpatient Rehabilitation &  Therapy  [address]   P: [phone]  F: [phone]  [] [facility]  Outpatient Rehabilitation &  Therapy  [address]  P: [phone]  F: [phone] [] [facility]  Outpatient Rehabilitation &  Therapy  [address]  P: [phone]  F: [phone] [] [facility]  Outpatient Rehabilitation &  Therapy  [address]  P: [phone]  F: [phone] [] [facility]  Outpatient Rehabilitation &  Therapy  [address]  P: [phone]  F: [phone]  [] [facility] Outpatient Rehabilitation &  Therapy  [address]  P: [phone]  F: [phone]     Physical Therapy Daily Treatment Note    Date:  2024  Patient Name:  Liyah Valladares    :  1945  MRN: [id number]  Physician: Trisha Morrissey MD               Insurance:  [employer] MEDICARE; brayden yr, BMN, no auth/copay. Est$4.55, no ded, oop 1500/1446 remains   Medical Diagnosis: N39.3 (ICD-10-CM) - Stress incontinence, female                             Rehab Codes: N81.84, R27.8, M62.81, N39.3   Onset Date: 2011                        Next 's appt: 24     Visit# / total visits: 2/10     Cancels/No Shows: 0/0    Subjective:    Pain:  [] Yes  [x] No  Location: N/A   Pain Rating: (0-10 scale) 0/10  Pain altered Tx:  []

## 2024-08-30 ENCOUNTER — HOSPITAL ENCOUNTER (OUTPATIENT)
Dept: PHYSICAL THERAPY | Facility: CLINIC | Age: 79
Setting detail: THERAPIES SERIES
Discharge: HOME | End: 2024-08-30
Payer: MEDICARE

## 2024-08-30 PROCEDURE — 97110 THERAPEUTIC EXERCISES: CPT

## 2024-08-30 NOTE — FLOWSHEET NOTE
- 1 x daily - 7 x weekly - 1 sets - 10 reps  - Standing Knee Flexion with Counter Support  - 1 x daily - 7 x weekly - 2 sets - 10 reps  - Standing March with Counter Support  - 1 x daily - 7 x weekly - 2 sets - 10 reps  - Side Stepping with Counter Support  - 1 x daily - 7 x weekly - 3 sets  - Standing Hip Extension with Counter Support  - 1 x daily - 7 x weekly - 1 sets - 10 reps  - Standing Hip Abduction with Counter Support  - 1 x daily - 7 x weekly - 1 sets - 10 reps  - Standing Hip Flexion with Counter Support  - 1 x daily - 7 x weekly - 1 sets - 10 reps  - Mini Squat with Counter Support  - 1 x daily - 7 x weekly - 2 sets - 10 reps    Plan: [] Continue current frequency toward long and short term goals.    [x] Specific Instructions for subsequent treatments: continue per POC      Time In: 1:58 pm            Time Out: 2:50 pm    Electronically signed by:  Nasrin Hopson PTA

## 2024-09-11 ENCOUNTER — APPOINTMENT (OUTPATIENT)
Dept: CT IMAGING | Age: 79
End: 2024-09-11
Payer: MEDICARE

## 2024-09-11 ENCOUNTER — HOSPITAL ENCOUNTER (EMERGENCY)
Age: 79
Discharge: HOME | End: 2024-09-11
Attending: EMERGENCY MEDICINE
Payer: MEDICARE

## 2024-09-11 ENCOUNTER — APPOINTMENT (OUTPATIENT)
Dept: GENERAL RADIOLOGY | Age: 79
End: 2024-09-11
Payer: MEDICARE

## 2024-09-11 VITALS
HEIGHT: 63 IN | HEART RATE: 88 BPM | WEIGHT: 237 LBS | BODY MASS INDEX: 41.99 KG/M2 | OXYGEN SATURATION: 96 % | TEMPERATURE: 97.5 F | SYSTOLIC BLOOD PRESSURE: 113 MMHG | RESPIRATION RATE: 18 BRPM | DIASTOLIC BLOOD PRESSURE: 61 MMHG

## 2024-09-11 DIAGNOSIS — S51.012A LACERATION OF SKIN OF LEFT ELBOW, INITIAL ENCOUNTER: ICD-10-CM

## 2024-09-11 DIAGNOSIS — S09.90XA CLOSED HEAD INJURY, INITIAL ENCOUNTER: Primary | ICD-10-CM
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
## 2024-09-11 DIAGNOSIS — S93.402A SPRAIN OF LEFT ANKLE, UNSPECIFIED LIGAMENT, INITIAL ENCOUNTER: ICD-10-CM

## 2024-09-11 PROCEDURE — 12004 RPR S/N/AX/GEN/TRK7.6-12.5CM: CPT

## 2024-09-11 PROCEDURE — 99284 EMERGENCY DEPT VISIT MOD MDM: CPT

## 2024-09-11 PROCEDURE — 70450 CT HEAD/BRAIN W/O DYE: CPT

## 2024-09-11 PROCEDURE — 2500000003 HC RX 250 WO HCPCS: Performed by: PHYSICIAN ASSISTANT

## 2024-09-11 PROCEDURE — 73080 X-RAY EXAM OF ELBOW: CPT

## 2024-09-11 PROCEDURE — 73610 X-RAY EXAM OF ANKLE: CPT

## 2024-09-11 RX ORDER — LIDOCAINE HYDROCHLORIDE 10 MG/ML
10 INJECTION, SOLUTION INFILTRATION; PERINEURAL ONCE
Status: COMPLETED | OUTPATIENT
Start: 2024-09-11 | End: 2024-09-11

## 2024-09-11 RX ADMIN — LIDOCAINE HYDROCHLORIDE 10 ML: 10 INJECTION, SOLUTION INFILTRATION; PERINEURAL at 17:03

## 2024-09-11 ASSESSMENT — PAIN SCALES - GENERAL: PAINLEVEL_OUTOF10: 5

## 2024-09-11 ASSESSMENT — PAIN - FUNCTIONAL ASSESSMENT: PAIN_FUNCTIONAL_ASSESSMENT: 0-10

## 2024-09-25 ENCOUNTER — OFFICE VISIT (OUTPATIENT)
Dept: PRIMARY CARE CLINIC | Age: 79
End: 2024-09-25

## 2024-09-25 VITALS
HEIGHT: 63 IN | HEART RATE: 87 BPM | SYSTOLIC BLOOD PRESSURE: 116 MMHG | OXYGEN SATURATION: 96 % | WEIGHT: 235.6 LBS | DIASTOLIC BLOOD PRESSURE: 74 MMHG | BODY MASS INDEX: 41.75 KG/M2

## 2024-09-25 DIAGNOSIS — Z23 NEED FOR VACCINATION: ICD-10-CM

## 2024-09-25 DIAGNOSIS — S00.03XA CONTUSION OF SCALP, INITIAL ENCOUNTER: ICD-10-CM

## 2024-09-25 DIAGNOSIS — S93.402A SPRAIN OF LEFT ANKLE, UNSPECIFIED LIGAMENT, INITIAL ENCOUNTER: ICD-10-CM

## 2024-09-25 DIAGNOSIS — S51.012A LACERATION OF LEFT ELBOW, INITIAL ENCOUNTER: Primary | ICD-10-CM

## 2024-09-25 ASSESSMENT — ENCOUNTER SYMPTOMS
COUGH: 0
SHORTNESS OF BREATH: 0
VOMITING: 0
NAUSEA: 0

## 2024-09-30 RX ORDER — POTASSIUM CHLORIDE 750 MG/1
TABLET, EXTENDED RELEASE ORAL
Qty: 180 TABLET | Refills: 3 | Status: SHIPPED | OUTPATIENT
Start: 2024-09-30

## 2024-10-31 ENCOUNTER — CLINICAL DOCUMENTATION (OUTPATIENT)
Dept: PHYSICAL THERAPY | Facility: CLINIC | Age: 79
End: 2024-10-31

## 2024-10-31 NOTE — FLOWSHEET NOTE
[] [facility]  Outpatient Rehabilitation &  Therapy  [address]  [phone]  [phone] [] [facility]  Outpatient Rehabilitation &  Therapy  [address]  P: [phone]  F: [phone] [x] [facility]  Outpatient Rehabilitation &  Therapy  [address]  P: [phone]  F: [phone] [] [facility]  Outpatient Rehabilitation &  Therapy  [address]  [phone]  [phone] [] [facility]  Outpatient Rehabilitation &  Therapy  [address]   P: [phone]  F: [phone]  [] [facility]  Outpatient Rehabilitation &  Therapy  [address]  P: [phone]  F: [phone] [] [facility]  Outpatient Rehabilitation &  Therapy  [address]  P: [phone]  F: [phone] [] [facility]  Outpatient Rehabilitation &  Therapy  [address]  P: [phone]  F: [phone] [] [facility]  Outpatient Rehabilitation &  Therapy  [address]  P: [phone]  F: [phone]  [] [facility] Outpatient Rehabilitation &  Therapy  [address]  P: [phone]  F: [phone]     Physical Therapy Discharge Note    Date: 10/31/2024      Patient: Liyah Valladares  : 1945  MRN: [id number]    Physician: Trisha Morrissey MD               Insurance:  [employer] MEDICARE; brayden yr, BMN, no auth/copay. Est$4.55, no ded, oop 1500/1446 remains   Medical Diagnosis: N39.3 (ICD-10-CM) - Stress incontinence, female                             Rehab Codes: N81.84, R27.8, M62.81, N39.3   Onset Date: 2011                        Next 's appt: 24     Visit# / total visits: 3/10   Date of initial visit: 8/15/24                Date of final visit: 24      Discharge Status:     [] Pt recovered from conditions.

## 2024-11-13 RX ORDER — ALLOPURINOL 100 MG/1
TABLET ORAL
Qty: 90 TABLET | Refills: 3 | Status: SHIPPED | OUTPATIENT
Start: 2024-11-13

## 2024-12-13 ENCOUNTER — OFFICE VISIT (OUTPATIENT)
Dept: PRIMARY CARE CLINIC | Age: 79
End: 2024-12-13

## 2024-12-13 VITALS
HEIGHT: 62 IN | HEART RATE: 90 BPM | DIASTOLIC BLOOD PRESSURE: 68 MMHG | BODY MASS INDEX: 40.48 KG/M2 | WEIGHT: 220 LBS | SYSTOLIC BLOOD PRESSURE: 134 MMHG | OXYGEN SATURATION: 99 %

## 2024-12-13 DIAGNOSIS — E03.9 ACQUIRED HYPOTHYROIDISM: Primary | Chronic | ICD-10-CM

## 2024-12-13 DIAGNOSIS — M54.16 LUMBAR RADICULOPATHY: Chronic | ICD-10-CM

## 2024-12-13 DIAGNOSIS — I10 ESSENTIAL HYPERTENSION: ICD-10-CM

## 2024-12-13 DIAGNOSIS — R73.9 HYPERGLYCEMIA: ICD-10-CM

## 2024-12-13 RX ORDER — MECLIZINE HCL 12.5 MG 12.5 MG/1
12.5 TABLET ORAL 3 TIMES DAILY PRN
Qty: 60 TABLET | Refills: 0 | Status: SHIPPED | OUTPATIENT
Start: 2024-12-13

## 2024-12-13 ASSESSMENT — ENCOUNTER SYMPTOMS
COUGH: 1
SHORTNESS OF BREATH: 1

## 2024-12-13 NOTE — PROGRESS NOTES
[facility]  [facility] PRIMARY CARE  [address] [address]  Dept: [phone]    Liyah Valladares is a 79 y.o. female Established patient, who presents today for her medical conditions/complaints as noted below.      Chief Complaint   Patient presents with    4 month follow up        HPI:   Pt doing okay.  No new issues.  Pain about the same. Worse with standing or walking too much.  Takes tyl and biofreeze as needed.    Some SOB with walking as well.  No CP. Coughing up some phlegm- seems to be sinus drainage.      Reviewed prior notes: None   Reviewed previous:   na    No components found for: \"LDLCHOLESTEROL\", \"LDLCALC\"    (goal LDL is <100)   AST (U/L)   Date Value   12/01/2023 27     ALT (U/L)   Date Value   12/01/2023 31     BUN (mg/dL)   Date Value   03/06/2024 16     Hemoglobin A1C (%)   Date Value   10/04/2023 6.4 (H)     TSH (uIu/mL)   Date Value   03/06/2024 0.862     BP Readings from Last 3 Encounters:   12/13/24 134/68   09/25/24 116/74   09/11/24 113/61          (goal 120/80)    Past Medical History:   Diagnosis Date    Acute cystitis with hematuria 8/23/2019    Acute idiopathic gout of foot 8/23/2019    Arthritis     Colon polyp     Glaucoma     right eye    Hyperlipidemia     Hypertension     Inflamed skin tag 5/15/2017    Renal insufficiency 2/2/2016    Spinal stenosis     Thyroid disease     hypothyroidism      Past Surgical History:   Procedure Laterality Date    BREAST SURGERY Left     open breast biopsy    CATARACT EXTRACTION, BILATERAL Bilateral     CHOLECYSTECTOMY      COLONOSCOPY      COLONOSCOPY N/A 04/19/2018    COLONOSCOPY POLYPECTOMY SNARE HOT BIOPSY performed by Crystal Koch MD at [facility] OR    DILATION AND CURETTAGE OF UTERUS      TOTAL KNEE ARTHROPLASTY Bilateral        Family History   Problem Relation Age of Onset    Diabetes Mother     COPD Father         ? maybe heart also    Cancer Maternal Grandmother         ?uterine?    Other Other

## 2025-01-14 RX ORDER — SIMVASTATIN 20 MG
TABLET ORAL
Qty: 90 TABLET | Refills: 3 | Status: SHIPPED | OUTPATIENT
Start: 2025-01-14

## 2025-02-15 DIAGNOSIS — I10 ESSENTIAL HYPERTENSION: Chronic | ICD-10-CM

## 2025-02-17 RX ORDER — LOSARTAN POTASSIUM 50 MG/1
50 TABLET ORAL DAILY
Qty: 90 TABLET | Refills: 3 | Status: SHIPPED | OUTPATIENT
Start: 2025-02-17

## 2025-04-09 LAB
ALBUMIN: 4.3 G/DL (ref 3.5–5.2)
ALK PHOSPHATASE: 88 U/L (ref 30–146)
ALT SERPL-CCNC: 16 U/L (ref 5–33)
ANION GAP SERPL CALCULATED.3IONS-SCNC: 12 MMOL/L (ref 7–16)
AST SERPL-CCNC: 14 U/L (ref 9–40)
BASOPHILS ABSOLUTE: 0.09 K/UL (ref 0–0.2)
BASOPHILS RELATIVE PERCENT: 1.3 % (ref 0–2)
BILIRUB SERPL-MCNC: 0.3 MG/DL
BUN BLDV-MCNC: 17 MG/DL (ref 8–23)
CALCIUM SERPL-MCNC: 10.3 MG/DL (ref 8.6–10.5)
CHLORIDE BLD-SCNC: 108 MMOL/L (ref 96–107)
CO2: 25 MMOL/L (ref 18–32)
CREAT SERPL-MCNC: 1.34 MG/DL (ref 0.51–1.15)
EGFR IF NONAFRICAN AMERICAN: 40 ML/MIN/1.73M2
EOSINOPHILS ABSOLUTE: 0.34 K/UL (ref 0–0.8)
EOSINOPHILS RELATIVE PERCENT: 4.9 % (ref 0–5)
GLUCOSE: 95 MG/DL (ref 70–100)
HCT VFR BLD CALC: 44.7 % (ref 35–47)
HEMOGLOBIN: 14.3 G/DL (ref 11.9–16)
IMMATURE GRANS (ABS): 0.02 K/UL (ref 0–0.06)
IMMATURE GRANULOCYTES %: 0.3 % (ref 0–2)
LYMPHOCYTES ABSOLUTE: 2.2 K/UL (ref 0.9–5.2)
LYMPHOCYTES RELATIVE PERCENT: 31.9 % (ref 20–45)
MCH RBC QN AUTO: 28.9 PG (ref 26–33)
MCHC RBC AUTO-ENTMCNC: 32 G/DL (ref 32–35)
MCV RBC AUTO: 91 FL (ref 75–100)
MONOCYTES ABSOLUTE: 0.81 K/UL (ref 0.1–1)
MONOCYTES RELATIVE PERCENT: 11.7 % (ref 0–13)
NEUTROPHILS ABSOLUTE: 3.44 K/UL (ref 1.9–8)
NEUTROPHILS RELATIVE PERCENT: 49.9 % (ref 45–75)
PDW BLD-RTO: 13.1 % (ref 11.2–14.8)
PLATELET # BLD: 235 THOUS/CMM (ref 140–440)
POTASSIUM SERPL-SCNC: 4.6 MMOL/L (ref 3.5–5.4)
RBC # BLD: 4.94 MILL/CMM (ref 3.8–5.2)
SODIUM BLD-SCNC: 145 MMOL/L (ref 135–148)
TOTAL PROTEIN: 6.5 G/DL (ref 6–8.3)
TSH SERPL DL<=0.05 MIU/L-ACNC: 0.12 UIU/ML (ref 0.27–4.2)
WBC # BLD: 6.9 THDS/CMM (ref 3.6–11)

## 2025-04-10 ENCOUNTER — RESULTS FOLLOW-UP (OUTPATIENT)
Dept: PRIMARY CARE CLINIC | Age: 80
End: 2025-04-10

## 2025-04-10 LAB — HBA1C MFR BLD: NORMAL %

## 2025-04-16 ENCOUNTER — OFFICE VISIT (OUTPATIENT)
Dept: PRIMARY CARE CLINIC | Age: 80
End: 2025-04-16

## 2025-04-16 VITALS
SYSTOLIC BLOOD PRESSURE: 118 MMHG | HEART RATE: 73 BPM | WEIGHT: 213.6 LBS | OXYGEN SATURATION: 97 % | DIASTOLIC BLOOD PRESSURE: 74 MMHG | BODY MASS INDEX: 39.31 KG/M2 | HEIGHT: 62 IN

## 2025-04-16 DIAGNOSIS — R73.03 PREDIABETES: ICD-10-CM

## 2025-04-16 DIAGNOSIS — Z00.00 MEDICARE ANNUAL WELLNESS VISIT, SUBSEQUENT: Primary | ICD-10-CM

## 2025-04-16 DIAGNOSIS — N18.32 STAGE 3B CHRONIC KIDNEY DISEASE (HCC): ICD-10-CM

## 2025-04-16 LAB — HBA1C MFR BLD: 5.8 %

## 2025-04-16 RX ORDER — VENLAFAXINE HYDROCHLORIDE 37.5 MG/1
37.5 CAPSULE, EXTENDED RELEASE ORAL DAILY
Qty: 90 CAPSULE | Refills: 3 | Status: SHIPPED | OUTPATIENT
Start: 2025-04-16

## 2025-04-16 RX ORDER — LEVOTHYROXINE SODIUM 125 UG/1
125 TABLET ORAL DAILY
Qty: 90 TABLET | Refills: 3 | Status: SHIPPED | OUTPATIENT
Start: 2025-04-16

## 2025-04-16 SDOH — ECONOMIC STABILITY: FOOD INSECURITY: WITHIN THE PAST 12 MONTHS, YOU WORRIED THAT YOUR FOOD WOULD RUN OUT BEFORE YOU GOT MONEY TO BUY MORE.: NEVER TRUE

## 2025-04-16 SDOH — ECONOMIC STABILITY: FOOD INSECURITY: WITHIN THE PAST 12 MONTHS, THE FOOD YOU BOUGHT JUST DIDN'T LAST AND YOU DIDN'T HAVE MONEY TO GET MORE.: NEVER TRUE

## 2025-04-16 ASSESSMENT — PATIENT HEALTH QUESTIONNAIRE - PHQ9
6. FEELING BAD ABOUT YOURSELF - OR THAT YOU ARE A FAILURE OR HAVE LET YOURSELF OR YOUR FAMILY DOWN: NOT AT ALL
10. IF YOU CHECKED OFF ANY PROBLEMS, HOW DIFFICULT HAVE THESE PROBLEMS MADE IT FOR YOU TO DO YOUR WORK, TAKE CARE OF THINGS AT HOME, OR GET ALONG WITH OTHER PEOPLE: SOMEWHAT DIFFICULT
5. POOR APPETITE OR OVEREATING: NOT AT ALL
SUM OF ALL RESPONSES TO PHQ QUESTIONS 1-9: 8
3. TROUBLE FALLING OR STAYING ASLEEP: SEVERAL DAYS
2. FEELING DOWN, DEPRESSED OR HOPELESS: NEARLY EVERY DAY
4. FEELING TIRED OR HAVING LITTLE ENERGY: SEVERAL DAYS
SUM OF ALL RESPONSES TO PHQ QUESTIONS 1-9: 8
7. TROUBLE CONCENTRATING ON THINGS, SUCH AS READING THE NEWSPAPER OR WATCHING TELEVISION: NOT AT ALL
1. LITTLE INTEREST OR PLEASURE IN DOING THINGS: NEARLY EVERY DAY
9. THOUGHTS THAT YOU WOULD BE BETTER OFF DEAD, OR OF HURTING YOURSELF: NOT AT ALL
SUM OF ALL RESPONSES TO PHQ QUESTIONS 1-9: 8
8. MOVING OR SPEAKING SO SLOWLY THAT OTHER PEOPLE COULD HAVE NOTICED. OR THE OPPOSITE, BEING SO FIGETY OR RESTLESS THAT YOU HAVE BEEN MOVING AROUND A LOT MORE THAN USUAL: NOT AT ALL
SUM OF ALL RESPONSES TO PHQ QUESTIONS 1-9: 8

## 2025-04-16 NOTE — PROGRESS NOTES
Medicare Annual Wellness Visit    Liyah Valladares is here for Medicare AWV (Pt here today for Medicare Annual Wellness Visit. Pt given three words to remember: phone, cheerful, blue. Time given to draw was 11:15./), Discuss Labs (Patient would like to discuss labs - A1c done today), and Depression (Patient would like to discuss Wellbutrin - states she doesn't think its working)    Assessment & Plan  1. Health maintenance.  - Renal function has shown slight improvement compared to previous readings, indicating stability over the past few years.  - Hematological parameters are within normal limits, with no evidence of anemia. Potassium levels are satisfactory at 4.6, as are sodium levels. Hepatic function tests are unremarkable.  - There is a minor deviation in thyroid function, which fluctuates between extremes. She is advised to maintain caution during episodes of dizziness or imbalance.  - Regular physical activity, such as marching in place or performing chair squats every hour for a duration of 5 minutes, is recommended. She is also encouraged to adopt an upright posture while ambulating.    2. Thyroid dysfunction.  - She is currently taking an extra dose of thyroid medication one day a week.  - It is recommended to stop the extra dose and return to her regular dosing schedule.  - A prescription for her thyroid medication will be sent to [facility] for once-daily administration.  - If symptoms worsen over the next 6 months, a reassessment of the dosage will be considered.    3. Depression.  - She reports that Wellbutrin is not effectively managing her symptoms, leading to irritability and crying over minor issues.  - A low dose of venlafaxine will be added to her current regimen of Wellbutrin to address these concerns.  - The prescription will be sent to mail order pharmacy.  - The combination of bupropion with venlafaxine is expected to improve her ability to manage stress without causing sedation.  Medicare annual

## 2025-04-21 RX ORDER — BUPROPION HYDROCHLORIDE 150 MG/1
150 TABLET ORAL EVERY MORNING
Qty: 90 TABLET | Refills: 3 | Status: SHIPPED | OUTPATIENT
Start: 2025-04-21

## 2025-04-30 ENCOUNTER — OFFICE VISIT (OUTPATIENT)
Dept: ORTHOPEDIC SURGERY | Age: 80
End: 2025-04-30
Payer: MEDICARE

## 2025-04-30 VITALS — HEIGHT: 62 IN | BODY MASS INDEX: 40.48 KG/M2 | RESPIRATION RATE: 18 BRPM | WEIGHT: 220 LBS

## 2025-04-30 DIAGNOSIS — M25.562 PAIN IN BOTH KNEES, UNSPECIFIED CHRONICITY: Primary | ICD-10-CM

## 2025-04-30 DIAGNOSIS — M25.561 PAIN IN BOTH KNEES, UNSPECIFIED CHRONICITY: Primary | ICD-10-CM

## 2025-04-30 PROCEDURE — 1123F ACP DISCUSS/DSCN MKR DOCD: CPT | Performed by: ORTHOPAEDIC SURGERY

## 2025-04-30 PROCEDURE — 1125F AMNT PAIN NOTED PAIN PRSNT: CPT | Performed by: ORTHOPAEDIC SURGERY

## 2025-04-30 PROCEDURE — 99213 OFFICE O/P EST LOW 20 MIN: CPT | Performed by: ORTHOPAEDIC SURGERY

## 2025-04-30 NOTE — PROGRESS NOTES
[facility]                   Alok Miramontes M.D.            [address]               [place], [place] [address]           Dept Phone: [phone]           Dept Fax:  [phone] [address]                       [address]           [place], [place] [address]          Dept Phone: [phone]           Dept Fax:  [phone]      Chief Compliant:  Chief Complaint   Patient presents with    Pain     Bilateral knees        History of Present Illness:  This is a 79 y.o. female who presents to the clinic today for evaluation / follow up of bilateral total knees are performed by me back in 2000 find making the 18 years old.  She says both knees are doing very well she states that the the only things that are still working good on her..       Review of Systems   Constitutional: Negative for fever, chills, sweats.   Eyes: Negative for changes in vision, or pain.   HENT: Negative for ear ache, epistaxis, or sore throat.  Respiratory/Cardio: Negative for Chest pain, palpitations, SOB, or cough.  Gastrointestinal: Negative for abdominal pain, N/V/D.   Genitourinary: Negative for dysuria, frequency, urgency, or hematuria.   Neurological: Negative for headache, numbness, or weakness.   Integumentary: Negative for rash, itching, laceration, or abrasion.   Musculoskeletal: Positive for Pain (Bilateral knees)       Physical Exam:  Constitutional: Patient is oriented to person, place, and time. Patient appears well-developed and well nourished.   HENT: Negative otherwise noted  Head: Normocephalic and Atraumatic  Nose: Normal  Eyes: Conjunctivae and EOM are normal  Neck: Normal range of motion Neck supple.    Respiratory/Cardio: Effort normal. No respiratory distress.  Musculoskeletal: Examination of both knees identical.  She has good motion 0 to 125 degrees she has good varus and valgus stability and good patellar tracking.  No other contributory

## 2025-06-18 ENCOUNTER — OFFICE VISIT (OUTPATIENT)
Dept: PRIMARY CARE CLINIC | Age: 80
End: 2025-06-18

## 2025-06-18 VITALS
HEIGHT: 62 IN | TEMPERATURE: 98.2 F | OXYGEN SATURATION: 97 % | SYSTOLIC BLOOD PRESSURE: 136 MMHG | WEIGHT: 214.4 LBS | BODY MASS INDEX: 39.45 KG/M2 | HEART RATE: 61 BPM | DIASTOLIC BLOOD PRESSURE: 84 MMHG

## 2025-06-18 DIAGNOSIS — J06.9 VIRAL UPPER RESPIRATORY TRACT INFECTION: Primary | ICD-10-CM

## 2025-06-18 DIAGNOSIS — R05.1 ACUTE COUGH: ICD-10-CM

## 2025-06-18 RX ORDER — BENZONATATE 100 MG/1
100 CAPSULE ORAL 3 TIMES DAILY PRN
Qty: 21 CAPSULE | Refills: 0 | Status: SHIPPED | OUTPATIENT
Start: 2025-06-18 | End: 2025-06-25

## 2025-06-18 ASSESSMENT — ENCOUNTER SYMPTOMS
SHORTNESS OF BREATH: 0
RHINORRHEA: 1
COUGH: 1
SORE THROAT: 0
EYE REDNESS: 0
WHEEZING: 0
SINUS PAIN: 0
EYE PAIN: 0
CHEST TIGHTNESS: 0
SINUS PRESSURE: 0
EYE DISCHARGE: 0

## 2025-06-18 NOTE — PROGRESS NOTES
[facility]  [address]  [place] [address]  Phone: [phone]  Fax: [phone]    Liyah Valladares is a 79 y.o. female who presents today for her medical conditions/complaints as noted below.     Cough (X4 days)    HPI:     Patient presents for cough x 4 days duration.  She had a runny nose last night, clear. +PND. Reports that she has chronic stuffy nose.   Cough is mostly dry, coughing up some phlegm.   Having coughing fits, especially in the evenings.   Some ear congestion, no pain.   No fever, chills, SOB, wheezing.   She has been taking OTC dextromethorphan in the evenings for cough.  Non smoker. But has second hand exposure to smoking in the household.   No h/o asthma or COPD.   Has a h/o cough- PCP recommended trial of flonase/nasal saline in the past.   Had cough with ACE-I use.     Current Outpatient Medications   Medication Sig Dispense Refill    benzonatate (TESSALON PERLES) 100 MG capsule Take 1 capsule by mouth 3 times daily as needed for Cough 21 capsule 0    buPROPion (WELLBUTRIN XL) 150 MG extended release tablet Take 1 tablet by mouth every morning 90 tablet 3    levothyroxine (SYNTHROID) 125 MCG tablet Take 1 tablet by mouth Daily 90 tablet 3    venlafaxine (EFFEXOR XR) 37.5 MG extended release capsule Take 1 capsule by mouth daily 90 capsule 3    losartan (COZAAR) 50 MG tablet TAKE 1 TABLET DAILY 90 tablet 3    simvastatin (ZOCOR) 20 MG tablet TAKE 1 TABLET AT BEDTIME 90 tablet 3    meclizine (ANTIVERT) 12.5 MG tablet Take 1 tablet by mouth 3 times daily as needed for Dizziness 60 tablet 0    allopurinol (ZYLOPRIM) 100 MG tablet TAKE 1 TABLET DAILY 90 tablet 3    potassium chloride (KLOR-CON) 10 MEQ extended release tablet TAKE 2 TABLETS DAILY 180 tablet 3    clotrimazole-betamethasone (LOTRISONE) 1-0.05 % cream Apply topically 2 times daily. (Patient taking differently: as needed Apply topically 2 times daily.) 45 g 1    nystatin (MYCOSTATIN) [id number]

## (undated) DEVICE — DEFENDO AIR WATER SUCTION AND BIOPSY VALVE KIT FOR  OLYMPUS: Brand: DEFENDO AIR/WATER/SUCTION AND BIOPSY VALVE

## (undated) DEVICE — SNARE ENDOSCP L240CM LOOP W27MM SHTH DIA2.4MM WRK CHN 2.8MM

## (undated) DEVICE — GLOVE ORANGE PI 7   MSG9070

## (undated) DEVICE — CANNULA NSL AD L2IN ETCO2 SAMP SFT CRUSH RESIST FEM AIRLFE

## (undated) DEVICE — DUP USE 393023 JELLY LUBRICATING EZ 2OZ